# Patient Record
Sex: FEMALE | Race: BLACK OR AFRICAN AMERICAN | NOT HISPANIC OR LATINO | Employment: UNEMPLOYED | URBAN - NONMETROPOLITAN AREA
[De-identification: names, ages, dates, MRNs, and addresses within clinical notes are randomized per-mention and may not be internally consistent; named-entity substitution may affect disease eponyms.]

---

## 2019-10-09 ENCOUNTER — NON-PROVIDER VISIT (OUTPATIENT)
Dept: URGENT CARE | Facility: PHYSICIAN GROUP | Age: 19
End: 2019-10-09

## 2019-10-09 DIAGNOSIS — Z02.1 PRE-EMPLOYMENT DRUG SCREENING: ICD-10-CM

## 2019-10-09 LAB
AMP AMPHETAMINE: NORMAL
COC COCAINE: NORMAL
INT CON NEG: NORMAL
INT CON POS: NORMAL
MET METHAMPHETAMINES: NORMAL
OPI OPIATES: NORMAL
PCP PHENCYCLIDINE: NORMAL
POC DRUG COMMENT 753798-OCCUPATIONAL HEALTH: NORMAL
THC: NORMAL

## 2019-10-09 PROCEDURE — 80305 DRUG TEST PRSMV DIR OPT OBS: CPT | Performed by: PHYSICIAN ASSISTANT

## 2020-01-15 ENCOUNTER — INITIAL PRENATAL (OUTPATIENT)
Dept: OBGYN | Facility: CLINIC | Age: 20
End: 2020-01-15
Payer: MEDICAID

## 2020-01-15 VITALS
HEIGHT: 65 IN | SYSTOLIC BLOOD PRESSURE: 112 MMHG | WEIGHT: 212 LBS | DIASTOLIC BLOOD PRESSURE: 68 MMHG | BODY MASS INDEX: 35.32 KG/M2

## 2020-01-15 DIAGNOSIS — N93.8 DUB (DYSFUNCTIONAL UTERINE BLEEDING): ICD-10-CM

## 2020-01-15 LAB
INT CON NEG: NEGATIVE
INT CON POS: POSITIVE
POC URINE PREGNANCY TEST: POSITIVE

## 2020-01-15 PROCEDURE — 99203 OFFICE O/P NEW LOW 30 MIN: CPT | Mod: 25 | Performed by: OBSTETRICS & GYNECOLOGY

## 2020-01-15 PROCEDURE — 81025 URINE PREGNANCY TEST: CPT | Performed by: OBSTETRICS & GYNECOLOGY

## 2020-01-15 PROCEDURE — 76830 TRANSVAGINAL US NON-OB: CPT | Performed by: OBSTETRICS & GYNECOLOGY

## 2020-01-15 RX ORDER — ACETAMINOPHEN 325 MG/1
650 TABLET ORAL EVERY 4 HOURS PRN
COMMUNITY
End: 2021-11-26

## 2020-01-15 NOTE — PROGRESS NOTES
visit  LMP: Unknown  Positive UPT today in clinic  WT: 212 lb  BP: 112/68   Pt states having N&V. States no other complaints.   Prasanna # [phone]

## 2020-01-15 NOTE — PROGRESS NOTES
Chief complaint;  This patient is a 19 y.o. female , No LMP recorded. Patient is pregnant. presents complaining of dysfunctional uterine bleeding.    Review of systems-denies; chest pain, shortness of breath, fever, chills, abdominal pain  Past OB history-  OB History    Para Term  AB Living   1             SAB TAB Ectopic Molar Multiple Live Births                    # Outcome Date GA Lbr Noel/2nd Weight Sex Delivery Anes PTL Lv   1 Current              Past surgical history-   Past Surgical History:   Procedure Laterality Date   • DENTAL EXTRACTION(S)     • TONSILLECTOMY       Past medical history- History reviewed. No pertinent past medical history.  Allergies- Patient has no known allergies.  Present medications-   Outpatient Encounter Medications as of 1/15/2020   Medication Sig Dispense Refill   • Prenatal MV-Min-Fe Fum-FA-DHA (PRENATAL 1 PO) Take  by mouth.     • acetaminophen (TYLENOL) 325 MG Tab Take 650 mg by mouth every four hours as needed.       No facility-administered encounter medications on file as of 1/15/2020.      Family history- no history of breast or ovarian cancer  Social history-   Social History     Socioeconomic History   • Marital status: Single     Spouse name: Not on file   • Number of children: Not on file   • Years of education: Not on file   • Highest education level: Not on file   Occupational History   • Not on file   Social Needs   • Financial resource strain: Not on file   • Food insecurity:     Worry: Not on file     Inability: Not on file   • Transportation needs:     Medical: Not on file     Non-medical: Not on file   Tobacco Use   • Smoking status: Never Smoker   • Smokeless tobacco: Never Used   Substance and Sexual Activity   • Alcohol use: Not on file   • Drug use: Not Currently     Types: Marijuana     Comment: Quit 2019   • Sexual activity: Yes     Partners: Male     Birth control/protection: Condom     Comment: Unplanned pregnancy.    Lifestyle  "  • Physical activity:     Days per week: Not on file     Minutes per session: Not on file   • Stress: Not on file   Relationships   • Social connections:     Talks on phone: Not on file     Gets together: Not on file     Attends Faith service: Not on file     Active member of club or organization: Not on file     Attends meetings of clubs or organizations: Not on file     Relationship status: Not on file   • Intimate partner violence:     Fear of current or ex partner: Not on file     Emotionally abused: Not on file     Physically abused: Not on file     Forced sexual activity: Not on file   Other Topics Concern   • Not on file   Social History Narrative   • Not on file         Physical examination;   Alert and oriented x3  General-well-developed well-nourished female in no apparent distress  Vitals:    01/15/20 0939   BP: 112/68   Weight: 96.2 kg (212 lb)   Height: 1.651 m (5' 5\")     Skin is warm and dry   HEENT-normocephalic,nontraumatic,PERRLA,EOMI  Throat- no edema or erythema  Neck-supple  Cardiovascular-regular rate and rhythm, normal S1-S2 without murmurs or gallops  Lungs-clear to auscultation bilaterally  Back-negative CVA tenderness  Abdomen-nondistended positive bowel sounds soft nontender without masses or hepatosplenomegaly  Pelvic examination;  External genitalia-without lesions  Vagina-no blood, no discharge  Cervix-closed,no gross lesions  Uterus-  8 weeks size, nontender  Adnexa- without mass or tenderness  Extremities-without cyanosis clubbing or edema  Neurologic-grossly intact    Transvaginal ultrasound; as performed and read by myself; intrauterine gestational sac containing florian pregnancy crown-rump length consistent with 8 weeks 5 days positive cardiac and fetal motion.  Positive yolk sac no free pelvic fluid EDC 8/21/2020    Impression;  Dysfunctional uterine bleeding-no evidence of ectopic or miscarriage    Plan;     Needs initial OB            "

## 2020-02-12 ENCOUNTER — INITIAL PRENATAL (OUTPATIENT)
Dept: OBGYN | Facility: CLINIC | Age: 20
End: 2020-02-12
Payer: MEDICAID

## 2020-02-12 ENCOUNTER — HOSPITAL ENCOUNTER (OUTPATIENT)
Facility: MEDICAL CENTER | Age: 20
End: 2020-02-12
Attending: NURSE PRACTITIONER
Payer: MEDICAID

## 2020-02-12 VITALS
WEIGHT: 206.4 LBS | BODY MASS INDEX: 34.39 KG/M2 | DIASTOLIC BLOOD PRESSURE: 68 MMHG | SYSTOLIC BLOOD PRESSURE: 118 MMHG | HEIGHT: 65 IN

## 2020-02-12 DIAGNOSIS — Z34.00 SUPERVISION OF NORMAL FIRST PREGNANCY, ANTEPARTUM: ICD-10-CM

## 2020-02-12 DIAGNOSIS — Z34.00 SUPERVISION OF NORMAL FIRST PREGNANCY, ANTEPARTUM: Primary | ICD-10-CM

## 2020-02-12 LAB
APPEARANCE UR: NORMAL
BILIRUB UR STRIP-MCNC: NORMAL MG/DL
COLOR UR AUTO: NORMAL
GLUCOSE UR STRIP.AUTO-MCNC: NEGATIVE MG/DL
KETONES UR STRIP.AUTO-MCNC: NORMAL MG/DL
LEUKOCYTE ESTERASE UR QL STRIP.AUTO: NORMAL
NITRITE UR QL STRIP.AUTO: NEGATIVE
PH UR STRIP.AUTO: 6 [PH] (ref 5–8)
PROT UR QL STRIP: NEGATIVE MG/DL
RBC UR QL AUTO: NEGATIVE
SP GR UR STRIP.AUTO: 1.02
UROBILINOGEN UR STRIP-MCNC: NORMAL MG/DL

## 2020-02-12 PROCEDURE — 59402 PR NEW OB HIGH RISK: CPT | Performed by: NURSE PRACTITIONER

## 2020-02-12 PROCEDURE — 88175 CYTOPATH C/V AUTO FLUID REDO: CPT

## 2020-02-12 PROCEDURE — 87491 CHLMYD TRACH DNA AMP PROBE: CPT

## 2020-02-12 PROCEDURE — 81002 URINALYSIS NONAUTO W/O SCOPE: CPT | Performed by: NURSE PRACTITIONER

## 2020-02-12 PROCEDURE — 87591 N.GONORRHOEAE DNA AMP PROB: CPT

## 2020-02-12 SDOH — HEALTH STABILITY: MENTAL HEALTH: HOW OFTEN DO YOU HAVE A DRINK CONTAINING ALCOHOL?: NEVER

## 2020-02-12 ASSESSMENT — ENCOUNTER SYMPTOMS
NEUROLOGICAL NEGATIVE: 1
MUSCULOSKELETAL NEGATIVE: 1
RESPIRATORY NEGATIVE: 1
PSYCHIATRIC NEGATIVE: 1
CARDIOVASCULAR NEGATIVE: 1
GASTROINTESTINAL NEGATIVE: 1
EYES NEGATIVE: 1
CONSTITUTIONAL NEGATIVE: 1

## 2020-02-12 NOTE — LETTER
Cystic Fibrosis Carrier Testing  Divine Elise    The following information is about a blood test that can be done to determine if you and/or your partner carry the gene for cystic fibrosis.    WHAT IS CYSTIC FIBROSIS?  · Cystic fibrosis (CF) is an inherited disease that affects more than 25,000 American children and young adults.  · Symptoms of CF vary but include lung congestion, pneumonia, diarrhea and poor growth.  Most people with CF have severe medical problems and some die at a young age.  Others have so few symptoms they are unaware they have CF.  · CF does not affect intelligence.  · Although there is no cure for CF at this time, scientists are making progress in improving treatment and in searching for a cure.  In the past many people with CF  at a very young age.  Today, many are living into their 20’s and 30’s.    IS THERE A CHANCE MY BABY COULD HAVE CYSTIC FIBROSIS?  · You can have a child with CF even if there is no history in your family (see chart below).  · CF testing can help determine if you are a carrier and at risk to have a child with CF.  Note: if both parents are carriers, there is a 1 in 4 (25%) chance with each pregnancy that they will have a child with CF.  · Carriers have one normal CF gene and one altered CF gene.  · People with CF have two altered CF genes.  · Most people have two normal copies of the CF gene.    Approximate risk that a couple with no family history of cystic fibrosis will have a child with cystic fibrosis:    Ethnic background / Risk     couple:  1 in 2,500   couple:  1 in 15,000            couple:  1 in 8,000     American couple:  1 in 32,000     WHAT TESTING IS AVAILABLE?  · There is a blood test that can be done to find out if you or your partner is a carrier.  · It is important to understand that CF carrier testing does not detect all CF carriers.  · If the test shows that you are both CF carriers, you unborn baby can be  tested to find out if the baby has CF.    HOW MUCH DOES IT COST TO HAVE CYSTIC FIBROSIS CARRIER TESTING?  · Cost and insurance coverage for CF carrier testing vary depending upon the laboratory used and your insurance policy.  · The average cost for CF carrier testing is $300 per person.  · Your genetic counselor can provide you with more information about cystic fibrosis carrier testing.    _____  Yes, I am interested in discussing carrier testing with a genetic counselor.    _____  No, I am not interested in CF carrier testing or in receiving more information about CF carrier testing.      Client signature: ________________________________________  2/12/2020

## 2020-02-12 NOTE — LETTER
February 12, 2020            Divine Elise is currently being cared for at [facility].  This patient is pregnant and may continue to work.  She should not lift greater than 20 pounds and requires frequent rest periods (10 minutes every two hours).  Please allow patient access to water and restrooms as frequently as needed      Thank you,          Aruna Hernandes C.N.M.

## 2020-02-12 NOTE — PROGRESS NOTES
NOB today  LMP: pt doesn't know  Last pap: not yet, today  Phone # [phone]  Pharmacy confirmed   Needs notes for heavy lifting and breaks every 2 hrs   No complaints or concerns

## 2020-02-13 LAB
C TRACH DNA GENITAL QL NAA+PROBE: NEGATIVE
CYTOLOGY REG CYTOL: NORMAL
N GONORRHOEA DNA GENITAL QL NAA+PROBE: NEGATIVE
SPECIMEN SOURCE: NORMAL

## 2020-02-13 NOTE — PROGRESS NOTES
S:  Divine Elise is a 19 y.o.  who presents for her new OB exam.  She is 12w5d with and RHODA of Estimated Date of Delivery: 20 based off of US . She has no complaints.  She is currently working at [email or web address]. Discussed heavy lifting and chemical exposure. No ER visits or previous care in this pregnancy.     Unsure about AFP.  Declines CF.  Denies VB, LOF, or cramping.  Denies dysuria, vaginal DC. Reports no fetal movement.     Pt is single and lives with mother.  Pregnancy is unplanned but desired. FOB is involved.      Discussed diet and exercise during pregnancy. Encouraged good nutrition, and daily exercise including walking or swimming. Discussed expected weight gain during pregnancy. Discussed adequate hydration during pregnancy.    History reviewed. No pertinent past medical history.  Family History   Problem Relation Age of Onset   • Autism Brother    • Seizures Brother    • Cancer Maternal Aunt         Breast   • Diabetes Maternal Grandfather      Social History     Socioeconomic History   • Marital status: Single     Spouse name: Not on file   • Number of children: Not on file   • Years of education: Not on file   • Highest education level: Not on file   Occupational History   • Not on file   Social Needs   • Financial resource strain: Not on file   • Food insecurity     Worry: Not on file     Inability: Not on file   • Transportation needs     Medical: Not on file     Non-medical: Not on file   Tobacco Use   • Smoking status: Never Smoker   • Smokeless tobacco: Never Used   Substance and Sexual Activity   • Alcohol use: Never     Frequency: Never   • Drug use: Not Currently     Types: Marijuana     Comment: Quit 2019   • Sexual activity: Yes     Partners: Male     Comment: Unplanned pregnancy.    Lifestyle   • Physical activity     Days per week: Not on file     Minutes per session: Not on file   • Stress: Not on file   Relationships   • Social connections     Talks on phone: Not on file      "Gets together: Not on file     Attends Voodoo service: Not on file     Active member of club or organization: Not on file     Attends meetings of clubs or organizations: Not on file     Relationship status: Not on file   • Intimate partner violence     Fear of current or ex partner: Not on file     Emotionally abused: Not on file     Physically abused: Not on file     Forced sexual activity: Not on file   Other Topics Concern   • Not on file   Social History Narrative   • Not on file     OB History    Para Term  AB Living   1             SAB TAB Ectopic Molar Multiple Live Births                    # Outcome Date GA Lbr Noel/2nd Weight Sex Delivery Anes PTL Lv   1 Current                History of Varicella Virus: no  History of HSV I or II in self or partner: no  History of Thyroid problems: no    O:  /68   Ht 1.651 m (5' 5\")   Wt 93.6 kg (206 lb 6.4 oz)    See Prenatal Physical.    Wet mount: deferred, no s/sx  Chaperone offered: yes    A:   1.  IUP @ 12w5d per US        2.  S=D        3.  See problem list below             Patient Active Problem List    Diagnosis Date Noted   • Supervision of normal first pregnancy, antepartum 2020         P:  1.  GC/CT & pap done        2.  Prenatal labs ordered - lab slip given        3.  Discussed PNV, diet, avoidances and adequate water intake        4.  NOB packet given        5.  Return to office in 4 wks        6.  Complete OB US in 7-8 wks        7.  Centering patient        8.  Discuss AFP at next visit    Orders Placed This Encounter   • US-OB 2ND 3RD TRI COMPLETE   • PREG CNTR PRENATAL PN   • THINPREP RFLX HPV ASCUS W/CTNG   • URINE DRUG SCREEN W/CONF (AR)   • POCT Urinalysis       HPI    Review of Systems   Constitutional: Negative.    HENT: Negative.    Eyes: Negative.    Respiratory: Negative.    Cardiovascular: Negative.    Gastrointestinal: Negative.    Genitourinary: Negative.    Musculoskeletal: Negative.    Skin: Negative.  " "  Neurological: Negative.    Endo/Heme/Allergies: Negative.    Psychiatric/Behavioral: Negative.    All other systems reviewed and are negative.         Objective:     /68   Ht 1.651 m (5' 5\")   Wt 93.6 kg (206 lb 6.4 oz)   BMI 34.35 kg/m²      Physical Exam  Vitals signs and nursing note reviewed.   Constitutional:       Appearance: Normal appearance.   HENT:      Head: Normocephalic.      Nose: Nose normal.   Eyes:      Conjunctiva/sclera: Conjunctivae normal.   Neck:      Musculoskeletal: Normal range of motion and neck supple.   Cardiovascular:      Rate and Rhythm: Normal rate and regular rhythm.      Pulses: Normal pulses.      Heart sounds: Normal heart sounds.   Pulmonary:      Effort: Pulmonary effort is normal.      Breath sounds: Normal breath sounds.   Abdominal:      General: Bowel sounds are normal.      Comments: Gravid uterus, c/w 13 week gestation   Genitourinary:     General: Normal vulva.   Musculoskeletal: Normal range of motion.   Skin:     General: Skin is warm and dry.   Neurological:      General: No focal deficit present.      Mental Status: She is alert and oriented to person, place, and time.   Psychiatric:         Mood and Affect: Mood normal.         Behavior: Behavior normal.         Thought Content: Thought content normal.         Judgment: Judgment normal.          Assessment/Plan:       1. Supervision of normal first pregnancy, antepartum  RHODA 8/21/20 per US  - POCT Urinalysis  - PREG CNTR PRENATAL PN; Future  - THINPREP RFLX HPV ASCUS W/CTNG; Future  - URINE DRUG SCREEN W/CONF (AR); Future  - US-OB 2ND 3RD TRI COMPLETE; Future    "

## 2020-03-23 ENCOUNTER — TELEPHONE (OUTPATIENT)
Dept: OBGYN | Facility: CLINIC | Age: 20
End: 2020-03-23

## 2020-03-23 NOTE — TELEPHONE ENCOUNTER
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 Pt states work is requesting a letter advising she cannot push the carts. Consulted with Georges who stated it was okay to write the letter.

## 2020-04-08 ENCOUNTER — APPOINTMENT (OUTPATIENT)
Dept: RADIOLOGY | Facility: IMAGING CENTER | Age: 20
End: 2020-04-08
Attending: NURSE PRACTITIONER
Payer: MEDICAID

## 2020-04-08 DIAGNOSIS — Z34.00 SUPERVISION OF NORMAL FIRST PREGNANCY, ANTEPARTUM: ICD-10-CM

## 2020-04-08 PROCEDURE — 76805 OB US >/= 14 WKS SNGL FETUS: CPT | Mod: TC | Performed by: OBSTETRICS & GYNECOLOGY

## 2020-04-24 ENCOUNTER — ROUTINE PRENATAL (OUTPATIENT)
Dept: OBGYN | Facility: CLINIC | Age: 20
End: 2020-04-24
Payer: MEDICAID

## 2020-04-24 VITALS — DIASTOLIC BLOOD PRESSURE: 66 MMHG | BODY MASS INDEX: 35.23 KG/M2 | SYSTOLIC BLOOD PRESSURE: 110 MMHG | WEIGHT: 211.7 LBS

## 2020-04-24 DIAGNOSIS — Z34.00 SUPERVISION OF NORMAL FIRST PREGNANCY, ANTEPARTUM: ICD-10-CM

## 2020-04-24 DIAGNOSIS — R87.612 LGSIL ON PAP SMEAR OF CERVIX: ICD-10-CM

## 2020-04-24 PROCEDURE — 90040 PR PRENATAL FOLLOW UP: CPT | Performed by: PHYSICIAN ASSISTANT

## 2020-04-24 NOTE — PROGRESS NOTES
Pt has no complaints with cramping, bleeding or pain. +FM. US, GC/CT wnl - pt notified of results. PAP LGSIL - pt notified of results and need for repeat PAP 1 yr. Pt also hasnt been here for 9 wks, so urged to keep appts. Pt urged to do PNL asap, though too late for AFP now. RTC 4 wk or sooner prn.

## 2020-04-28 ENCOUNTER — HOSPITAL ENCOUNTER (OUTPATIENT)
Dept: LAB | Facility: MEDICAL CENTER | Age: 20
End: 2020-04-28
Attending: PHYSICIAN ASSISTANT
Payer: MEDICAID

## 2020-04-28 ENCOUNTER — HOSPITAL ENCOUNTER (OUTPATIENT)
Dept: LAB | Facility: MEDICAL CENTER | Age: 20
End: 2020-04-28
Attending: NURSE PRACTITIONER
Payer: MEDICAID

## 2020-04-28 DIAGNOSIS — Z34.00 SUPERVISION OF NORMAL FIRST PREGNANCY, ANTEPARTUM: ICD-10-CM

## 2020-04-28 LAB
ABO GROUP BLD: NORMAL
APPEARANCE UR: CLEAR
BACTERIA #/AREA URNS HPF: ABNORMAL /HPF
BASOPHILS # BLD AUTO: 0.4 % (ref 0–1.8)
BASOPHILS # BLD: 0.03 K/UL (ref 0–0.12)
BILIRUB UR QL STRIP.AUTO: NEGATIVE
BLD GP AB SCN SERPL QL: NORMAL
COLOR UR: YELLOW
EOSINOPHIL # BLD AUTO: 0.04 K/UL (ref 0–0.51)
EOSINOPHIL NFR BLD: 0.5 % (ref 0–6.9)
EPI CELLS #/AREA URNS HPF: ABNORMAL /HPF
ERYTHROCYTE [DISTWIDTH] IN BLOOD BY AUTOMATED COUNT: 45.5 FL (ref 35.9–50)
GLUCOSE UR STRIP.AUTO-MCNC: NEGATIVE MG/DL
HBV SURFACE AG SER QL: ABNORMAL
HCT VFR BLD AUTO: 34.2 % (ref 37–47)
HCV AB SER QL: NORMAL
HGB BLD-MCNC: 11.6 G/DL (ref 12–16)
HIV 1+2 AB+HIV1 P24 AG SERPL QL IA: NORMAL
HYALINE CASTS #/AREA URNS LPF: ABNORMAL /LPF
IMM GRANULOCYTES # BLD AUTO: 0.03 K/UL (ref 0–0.11)
IMM GRANULOCYTES NFR BLD AUTO: 0.4 % (ref 0–0.9)
KETONES UR STRIP.AUTO-MCNC: NEGATIVE MG/DL
LEUKOCYTE ESTERASE UR QL STRIP.AUTO: ABNORMAL
LYMPHOCYTES # BLD AUTO: 1.4 K/UL (ref 1–4.8)
LYMPHOCYTES NFR BLD: 17.3 % (ref 22–41)
MCH RBC QN AUTO: 31.5 PG (ref 27–33)
MCHC RBC AUTO-ENTMCNC: 33.9 G/DL (ref 33.6–35)
MCV RBC AUTO: 92.9 FL (ref 81.4–97.8)
MICRO URNS: ABNORMAL
MONOCYTES # BLD AUTO: 0.41 K/UL (ref 0–0.85)
MONOCYTES NFR BLD AUTO: 5.1 % (ref 0–13.4)
NEUTROPHILS # BLD AUTO: 6.16 K/UL (ref 2–7.15)
NEUTROPHILS NFR BLD: 76.3 % (ref 44–72)
NITRITE UR QL STRIP.AUTO: NEGATIVE
NRBC # BLD AUTO: 0 K/UL
NRBC BLD-RTO: 0 /100 WBC
PH UR STRIP.AUTO: 7 [PH] (ref 5–8)
PLATELET # BLD AUTO: 182 K/UL (ref 164–446)
PMV BLD AUTO: 10.7 FL (ref 9–12.9)
PROT UR QL STRIP: NEGATIVE MG/DL
RBC # BLD AUTO: 3.68 M/UL (ref 4.2–5.4)
RBC # URNS HPF: ABNORMAL /HPF
RBC UR QL AUTO: NEGATIVE
RH BLD: NORMAL
RUBV AB SER QL: 15.4 IU/ML
SP GR UR STRIP.AUTO: 1.02
TREPONEMA PALLIDUM IGG+IGM AB [PRESENCE] IN SERUM OR PLASMA BY IMMUNOASSAY: ABNORMAL
UROBILINOGEN UR STRIP.AUTO-MCNC: 0.2 MG/DL
WBC # BLD AUTO: 8.1 K/UL (ref 4.8–10.8)
WBC #/AREA URNS HPF: ABNORMAL /HPF

## 2020-04-28 PROCEDURE — 86803 HEPATITIS C AB TEST: CPT

## 2020-04-28 PROCEDURE — 87340 HEPATITIS B SURFACE AG IA: CPT

## 2020-04-28 PROCEDURE — 87389 HIV-1 AG W/HIV-1&-2 AB AG IA: CPT

## 2020-04-28 PROCEDURE — 81001 URINALYSIS AUTO W/SCOPE: CPT | Mod: XU

## 2020-04-28 PROCEDURE — 86900 BLOOD TYPING SEROLOGIC ABO: CPT

## 2020-04-28 PROCEDURE — 36415 COLL VENOUS BLD VENIPUNCTURE: CPT

## 2020-04-28 PROCEDURE — 86762 RUBELLA ANTIBODY: CPT

## 2020-04-28 PROCEDURE — 85025 COMPLETE CBC W/AUTO DIFF WBC: CPT

## 2020-04-28 PROCEDURE — 86850 RBC ANTIBODY SCREEN: CPT

## 2020-04-28 PROCEDURE — 80307 DRUG TEST PRSMV CHEM ANLYZR: CPT

## 2020-04-28 PROCEDURE — 86901 BLOOD TYPING SEROLOGIC RH(D): CPT

## 2020-04-28 PROCEDURE — 86780 TREPONEMA PALLIDUM: CPT

## 2020-04-29 PROBLEM — O26.899 RH NEGATIVE STATUS DURING PREGNANCY: Status: ACTIVE | Noted: 2020-04-29

## 2020-04-29 PROBLEM — Z67.91 RH NEGATIVE STATUS DURING PREGNANCY: Status: ACTIVE | Noted: 2020-04-29

## 2020-04-30 LAB
AMPHET CTO UR CFM-MCNC: NEGATIVE NG/ML
BARBITURATES CTO UR CFM-MCNC: NEGATIVE NG/ML
BENZODIAZ CTO UR CFM-MCNC: NEGATIVE NG/ML
CANNABINOIDS CTO UR CFM-MCNC: NEGATIVE NG/ML
COCAINE CTO UR CFM-MCNC: NEGATIVE NG/ML
DRUG COMMENT 753798: NORMAL
METHADONE CTO UR CFM-MCNC: NEGATIVE NG/ML
OPIATES CTO UR CFM-MCNC: NEGATIVE NG/ML
PCP CTO UR CFM-MCNC: NEGATIVE NG/ML
PROPOXYPH CTO UR CFM-MCNC: NEGATIVE NG/ML

## 2020-05-14 ENCOUNTER — ANESTHESIA EVENT (OUTPATIENT)
Dept: OBGYN | Facility: MEDICAL CENTER | Age: 20
End: 2020-05-14
Payer: MEDICAID

## 2020-05-14 ENCOUNTER — HOSPITAL ENCOUNTER (INPATIENT)
Facility: MEDICAL CENTER | Age: 20
LOS: 4 days | End: 2020-05-18
Attending: OBSTETRICS & GYNECOLOGY | Admitting: OBSTETRICS & GYNECOLOGY
Payer: MEDICAID

## 2020-05-14 ENCOUNTER — APPOINTMENT (OUTPATIENT)
Dept: RADIOLOGY | Facility: MEDICAL CENTER | Age: 20
End: 2020-05-14
Attending: OBSTETRICS & GYNECOLOGY
Payer: MEDICAID

## 2020-05-14 ENCOUNTER — ANESTHESIA (OUTPATIENT)
Dept: OBGYN | Facility: MEDICAL CENTER | Age: 20
End: 2020-05-14
Payer: MEDICAID

## 2020-05-14 LAB
A1 MICROGLOB PLACENTAL VAG QL: NEGATIVE
APPEARANCE UR: CLEAR
COLOR UR AUTO: YELLOW
ERYTHROCYTE [DISTWIDTH] IN BLOOD BY AUTOMATED COUNT: 43.1 FL (ref 35.9–50)
ERYTHROCYTE [DISTWIDTH] IN BLOOD BY AUTOMATED COUNT: 45.8 FL (ref 35.9–50)
GLUCOSE UR QL STRIP.AUTO: NEGATIVE MG/DL
HCT VFR BLD AUTO: 34.6 % (ref 37–47)
HCT VFR BLD AUTO: 36.9 % (ref 37–47)
HGB BLD-MCNC: 12.1 G/DL (ref 12–16)
HGB BLD-MCNC: 12.7 G/DL (ref 12–16)
KETONES UR QL STRIP.AUTO: NEGATIVE MG/DL
LEUKOCYTE ESTERASE UR QL STRIP.AUTO: ABNORMAL
MCH RBC QN AUTO: 31.3 PG (ref 27–33)
MCH RBC QN AUTO: 32 PG (ref 27–33)
MCHC RBC AUTO-ENTMCNC: 34.4 G/DL (ref 33.6–35)
MCHC RBC AUTO-ENTMCNC: 35 G/DL (ref 33.6–35)
MCV RBC AUTO: 89.6 FL (ref 81.4–97.8)
MCV RBC AUTO: 92.9 FL (ref 81.4–97.8)
NITRITE UR QL STRIP.AUTO: NEGATIVE
PATHOLOGY CONSULT NOTE: NORMAL
PH UR STRIP.AUTO: 7 [PH] (ref 5–8)
PLATELET # BLD AUTO: 178 K/UL (ref 164–446)
PLATELET # BLD AUTO: 194 K/UL (ref 164–446)
PMV BLD AUTO: 10.1 FL (ref 9–12.9)
PMV BLD AUTO: 10.2 FL (ref 9–12.9)
PROT UR QL STRIP: NEGATIVE MG/DL
RBC # BLD AUTO: 3.86 M/UL (ref 4.2–5.4)
RBC # BLD AUTO: 3.97 M/UL (ref 4.2–5.4)
RBC UR QL AUTO: ABNORMAL
SP GR UR: 1.02 (ref 1–1.03)
WBC # BLD AUTO: 14 K/UL (ref 4.8–10.8)
WBC # BLD AUTO: 18.7 K/UL (ref 4.8–10.8)

## 2020-05-14 PROCEDURE — 160009 HCHG ANES TIME/MIN: Performed by: OBSTETRICS & GYNECOLOGY

## 2020-05-14 PROCEDURE — 59514 CESAREAN DELIVERY ONLY: CPT | Performed by: OBSTETRICS & GYNECOLOGY

## 2020-05-14 PROCEDURE — 59514 CESAREAN DELIVERY ONLY: CPT

## 2020-05-14 PROCEDURE — 160041 HCHG SURGERY MINUTES - EA ADDL 1 MIN LEVEL 4: Performed by: OBSTETRICS & GYNECOLOGY

## 2020-05-14 PROCEDURE — 84112 EVAL AMNIOTIC FLUID PROTEIN: CPT

## 2020-05-14 PROCEDURE — 160035 HCHG PACU - 1ST 60 MINS PHASE I: Performed by: OBSTETRICS & GYNECOLOGY

## 2020-05-14 PROCEDURE — 85027 COMPLETE CBC AUTOMATED: CPT

## 2020-05-14 PROCEDURE — 87150 DNA/RNA AMPLIFIED PROBE: CPT

## 2020-05-14 PROCEDURE — A9270 NON-COVERED ITEM OR SERVICE: HCPCS | Performed by: OBSTETRICS & GYNECOLOGY

## 2020-05-14 PROCEDURE — 59514 CESAREAN DELIVERY ONLY: CPT | Mod: 80 | Performed by: OBSTETRICS & GYNECOLOGY

## 2020-05-14 PROCEDURE — 36415 COLL VENOUS BLD VENIPUNCTURE: CPT

## 2020-05-14 PROCEDURE — A9270 NON-COVERED ITEM OR SERVICE: HCPCS

## 2020-05-14 PROCEDURE — 87081 CULTURE SCREEN ONLY: CPT

## 2020-05-14 PROCEDURE — 700102 HCHG RX REV CODE 250 W/ 637 OVERRIDE(OP): Performed by: OBSTETRICS & GYNECOLOGY

## 2020-05-14 PROCEDURE — 700102 HCHG RX REV CODE 250 W/ 637 OVERRIDE(OP): Performed by: ANESTHESIOLOGY

## 2020-05-14 PROCEDURE — 700111 HCHG RX REV CODE 636 W/ 250 OVERRIDE (IP)

## 2020-05-14 PROCEDURE — A9270 NON-COVERED ITEM OR SERVICE: HCPCS | Performed by: ANESTHESIOLOGY

## 2020-05-14 PROCEDURE — 700105 HCHG RX REV CODE 258

## 2020-05-14 PROCEDURE — 76816 OB US FOLLOW-UP PER FETUS: CPT

## 2020-05-14 PROCEDURE — 160002 HCHG RECOVERY MINUTES (STAT): Performed by: OBSTETRICS & GYNECOLOGY

## 2020-05-14 PROCEDURE — 770002 HCHG ROOM/CARE - OB PRIVATE (112)

## 2020-05-14 PROCEDURE — 88305 TISSUE EXAM BY PATHOLOGIST: CPT

## 2020-05-14 PROCEDURE — 700102 HCHG RX REV CODE 250 W/ 637 OVERRIDE(OP)

## 2020-05-14 PROCEDURE — 160029 HCHG SURGERY MINUTES - 1ST 30 MINS LEVEL 4: Performed by: OBSTETRICS & GYNECOLOGY

## 2020-05-14 PROCEDURE — 160048 HCHG OR STATISTICAL LEVEL 1-5: Performed by: OBSTETRICS & GYNECOLOGY

## 2020-05-14 PROCEDURE — 700101 HCHG RX REV CODE 250: Performed by: ANESTHESIOLOGY

## 2020-05-14 PROCEDURE — A6212 FOAM DRG <=16 SQ IN W/BORDER: HCPCS

## 2020-05-14 PROCEDURE — 700111 HCHG RX REV CODE 636 W/ 250 OVERRIDE (IP): Performed by: ANESTHESIOLOGY

## 2020-05-14 PROCEDURE — 700111 HCHG RX REV CODE 636 W/ 250 OVERRIDE (IP): Performed by: OBSTETRICS & GYNECOLOGY

## 2020-05-14 PROCEDURE — 81002 URINALYSIS NONAUTO W/O SCOPE: CPT

## 2020-05-14 PROCEDURE — 700105 HCHG RX REV CODE 258: Performed by: ANESTHESIOLOGY

## 2020-05-14 RX ORDER — INDOMETHACIN 25 MG/1
25 CAPSULE ORAL EVERY 6 HOURS
Status: DISCONTINUED | OUTPATIENT
Start: 2020-05-14 | End: 2020-05-14

## 2020-05-14 RX ORDER — HYDRALAZINE HYDROCHLORIDE 20 MG/ML
5 INJECTION INTRAMUSCULAR; INTRAVENOUS
Status: CANCELLED | OUTPATIENT
Start: 2020-05-14

## 2020-05-14 RX ORDER — METOCLOPRAMIDE HYDROCHLORIDE 5 MG/ML
INJECTION INTRAMUSCULAR; INTRAVENOUS
Status: COMPLETED
Start: 2020-05-14 | End: 2020-05-14

## 2020-05-14 RX ORDER — DIPHENHYDRAMINE HYDROCHLORIDE 50 MG/ML
25 INJECTION INTRAMUSCULAR; INTRAVENOUS EVERY 6 HOURS PRN
Status: ACTIVE | OUTPATIENT
Start: 2020-05-14 | End: 2020-05-15

## 2020-05-14 RX ORDER — HYDROMORPHONE HYDROCHLORIDE 1 MG/ML
0.6 INJECTION, SOLUTION INTRAMUSCULAR; INTRAVENOUS; SUBCUTANEOUS
Status: CANCELLED | OUTPATIENT
Start: 2020-05-14

## 2020-05-14 RX ORDER — HYDROMORPHONE HYDROCHLORIDE 1 MG/ML
0.2 INJECTION, SOLUTION INTRAMUSCULAR; INTRAVENOUS; SUBCUTANEOUS
Status: CANCELLED | OUTPATIENT
Start: 2020-05-14

## 2020-05-14 RX ORDER — BUPIVACAINE HYDROCHLORIDE 7.5 MG/ML
INJECTION, SOLUTION INTRASPINAL
Status: COMPLETED | OUTPATIENT
Start: 2020-05-14 | End: 2020-05-14

## 2020-05-14 RX ORDER — PENICILLIN G POTASSIUM 5000000 [IU]/1
5 INJECTION, POWDER, FOR SOLUTION INTRAMUSCULAR; INTRAVENOUS ONCE
Status: COMPLETED | OUTPATIENT
Start: 2020-05-14 | End: 2020-05-14

## 2020-05-14 RX ORDER — SODIUM CHLORIDE, SODIUM LACTATE, POTASSIUM CHLORIDE, CALCIUM CHLORIDE 600; 310; 30; 20 MG/100ML; MG/100ML; MG/100ML; MG/100ML
INJECTION, SOLUTION INTRAVENOUS PRN
Status: DISCONTINUED | OUTPATIENT
Start: 2020-05-14 | End: 2020-05-18 | Stop reason: HOSPADM

## 2020-05-14 RX ORDER — OXYCODONE HYDROCHLORIDE 10 MG/1
10 TABLET ORAL EVERY 4 HOURS PRN
Status: ACTIVE | OUTPATIENT
Start: 2020-05-14 | End: 2020-05-15

## 2020-05-14 RX ORDER — CITRIC ACID/SODIUM CITRATE 334-500MG
SOLUTION, ORAL ORAL
Status: COMPLETED
Start: 2020-05-14 | End: 2020-05-14

## 2020-05-14 RX ORDER — MISOPROSTOL 200 UG/1
800 TABLET ORAL
Status: DISCONTINUED | OUTPATIENT
Start: 2020-05-14 | End: 2020-05-18 | Stop reason: HOSPADM

## 2020-05-14 RX ORDER — MAGNESIUM SULFATE HEPTAHYDRATE 40 MG/ML
2 INJECTION, SOLUTION INTRAVENOUS ONCE
Status: DISCONTINUED | OUTPATIENT
Start: 2020-05-14 | End: 2020-05-14

## 2020-05-14 RX ORDER — MAGNESIUM SULFATE HEPTAHYDRATE 40 MG/ML
INJECTION, SOLUTION INTRAVENOUS
Status: COMPLETED
Start: 2020-05-14 | End: 2020-05-14

## 2020-05-14 RX ORDER — HYDROMORPHONE HYDROCHLORIDE 1 MG/ML
0.4 INJECTION, SOLUTION INTRAMUSCULAR; INTRAVENOUS; SUBCUTANEOUS
Status: CANCELLED | OUTPATIENT
Start: 2020-05-14

## 2020-05-14 RX ORDER — DIPHENHYDRAMINE HYDROCHLORIDE 50 MG/ML
12.5 INJECTION INTRAMUSCULAR; INTRAVENOUS EVERY 6 HOURS PRN
Status: ACTIVE | OUTPATIENT
Start: 2020-05-14 | End: 2020-05-15

## 2020-05-14 RX ORDER — HYDROMORPHONE HYDROCHLORIDE 1 MG/ML
0.2 INJECTION, SOLUTION INTRAMUSCULAR; INTRAVENOUS; SUBCUTANEOUS
Status: ACTIVE | OUTPATIENT
Start: 2020-05-14 | End: 2020-05-15

## 2020-05-14 RX ORDER — CEFAZOLIN SODIUM 1 G/3ML
INJECTION, POWDER, FOR SOLUTION INTRAMUSCULAR; INTRAVENOUS PRN
Status: DISCONTINUED | OUTPATIENT
Start: 2020-05-14 | End: 2020-05-14 | Stop reason: SURG

## 2020-05-14 RX ORDER — MORPHINE SULFATE 0.5 MG/ML
INJECTION, SOLUTION EPIDURAL; INTRATHECAL; INTRAVENOUS
Status: COMPLETED | OUTPATIENT
Start: 2020-05-14 | End: 2020-05-14

## 2020-05-14 RX ORDER — SODIUM CHLORIDE 9 MG/ML
INJECTION, SOLUTION INTRAVENOUS
Status: ACTIVE
Start: 2020-05-14 | End: 2020-05-14

## 2020-05-14 RX ORDER — MAGNESIUM SULFATE HEPTAHYDRATE 40 MG/ML
4 INJECTION, SOLUTION INTRAVENOUS ONCE
Status: COMPLETED | OUTPATIENT
Start: 2020-05-14 | End: 2020-05-14

## 2020-05-14 RX ORDER — DIPHENHYDRAMINE HYDROCHLORIDE 50 MG/ML
12.5 INJECTION INTRAMUSCULAR; INTRAVENOUS
Status: CANCELLED | OUTPATIENT
Start: 2020-05-14

## 2020-05-14 RX ORDER — OXYCODONE HCL 5 MG/5 ML
5 SOLUTION, ORAL ORAL
Status: CANCELLED | OUTPATIENT
Start: 2020-05-14

## 2020-05-14 RX ORDER — BETAMETHASONE SODIUM PHOSPHATE AND BETAMETHASONE ACETATE 3; 3 MG/ML; MG/ML
12 INJECTION, SUSPENSION INTRA-ARTICULAR; INTRALESIONAL; INTRAMUSCULAR; SOFT TISSUE EVERY 24 HOURS
Status: DISCONTINUED | OUTPATIENT
Start: 2020-05-14 | End: 2020-05-14

## 2020-05-14 RX ORDER — MEPERIDINE HYDROCHLORIDE 25 MG/ML
6.25 INJECTION INTRAMUSCULAR; INTRAVENOUS; SUBCUTANEOUS
Status: CANCELLED | OUTPATIENT
Start: 2020-05-14

## 2020-05-14 RX ORDER — SODIUM CHLORIDE, SODIUM GLUCONATE, SODIUM ACETATE, POTASSIUM CHLORIDE AND MAGNESIUM CHLORIDE 526; 502; 368; 37; 30 MG/100ML; MG/100ML; MG/100ML; MG/100ML; MG/100ML
INJECTION, SOLUTION INTRAVENOUS
Status: DISCONTINUED | OUTPATIENT
Start: 2020-05-14 | End: 2020-05-14 | Stop reason: SURG

## 2020-05-14 RX ORDER — DOCUSATE SODIUM 100 MG/1
100 CAPSULE, LIQUID FILLED ORAL 2 TIMES DAILY
Status: DISCONTINUED | OUTPATIENT
Start: 2020-05-14 | End: 2020-05-14

## 2020-05-14 RX ORDER — ONDANSETRON 2 MG/ML
4 INJECTION INTRAMUSCULAR; INTRAVENOUS
Status: CANCELLED | OUTPATIENT
Start: 2020-05-14

## 2020-05-14 RX ORDER — HALOPERIDOL 5 MG/ML
1 INJECTION INTRAMUSCULAR
Status: CANCELLED | OUTPATIENT
Start: 2020-05-14

## 2020-05-14 RX ORDER — INDOMETHACIN 50 MG/1
50 CAPSULE ORAL ONCE
Status: COMPLETED | OUTPATIENT
Start: 2020-05-14 | End: 2020-05-14

## 2020-05-14 RX ORDER — SIMETHICONE 80 MG
80 TABLET,CHEWABLE ORAL 4 TIMES DAILY PRN
Status: DISCONTINUED | OUTPATIENT
Start: 2020-05-14 | End: 2020-05-18 | Stop reason: HOSPADM

## 2020-05-14 RX ORDER — ONDANSETRON 2 MG/ML
4 INJECTION INTRAMUSCULAR; INTRAVENOUS EVERY 6 HOURS PRN
Status: ACTIVE | OUTPATIENT
Start: 2020-05-14 | End: 2020-05-15

## 2020-05-14 RX ORDER — OXYCODONE HYDROCHLORIDE 5 MG/1
5 TABLET ORAL EVERY 4 HOURS PRN
Status: DISPENSED | OUTPATIENT
Start: 2020-05-14 | End: 2020-05-15

## 2020-05-14 RX ORDER — DOCUSATE SODIUM 100 MG/1
100 CAPSULE, LIQUID FILLED ORAL 2 TIMES DAILY PRN
Status: DISCONTINUED | OUTPATIENT
Start: 2020-05-14 | End: 2020-05-18 | Stop reason: HOSPADM

## 2020-05-14 RX ORDER — SODIUM CHLORIDE, SODIUM LACTATE, POTASSIUM CHLORIDE, CALCIUM CHLORIDE 600; 310; 30; 20 MG/100ML; MG/100ML; MG/100ML; MG/100ML
INJECTION, SOLUTION INTRAVENOUS CONTINUOUS
Status: CANCELLED | OUTPATIENT
Start: 2020-05-14

## 2020-05-14 RX ORDER — OXYTOCIN 10 [USP'U]/ML
INJECTION, SOLUTION INTRAMUSCULAR; INTRAVENOUS PRN
Status: DISCONTINUED | OUTPATIENT
Start: 2020-05-14 | End: 2020-05-14 | Stop reason: SURG

## 2020-05-14 RX ORDER — ACETAMINOPHEN 500 MG
1000 TABLET ORAL EVERY 6 HOURS
Status: COMPLETED | OUTPATIENT
Start: 2020-05-14 | End: 2020-05-15

## 2020-05-14 RX ORDER — PENICILLIN G POTASSIUM 5000000 [IU]/1
INJECTION, POWDER, FOR SOLUTION INTRAMUSCULAR; INTRAVENOUS
Status: COMPLETED
Start: 2020-05-14 | End: 2020-05-14

## 2020-05-14 RX ORDER — HYDROMORPHONE HYDROCHLORIDE 1 MG/ML
0.4 INJECTION, SOLUTION INTRAMUSCULAR; INTRAVENOUS; SUBCUTANEOUS
Status: ACTIVE | OUTPATIENT
Start: 2020-05-14 | End: 2020-05-15

## 2020-05-14 RX ORDER — METOPROLOL TARTRATE 1 MG/ML
1 INJECTION, SOLUTION INTRAVENOUS
Status: CANCELLED | OUTPATIENT
Start: 2020-05-14

## 2020-05-14 RX ORDER — KETOROLAC TROMETHAMINE 30 MG/ML
INJECTION, SOLUTION INTRAMUSCULAR; INTRAVENOUS PRN
Status: DISCONTINUED | OUTPATIENT
Start: 2020-05-14 | End: 2020-05-14 | Stop reason: SURG

## 2020-05-14 RX ORDER — KETOROLAC TROMETHAMINE 30 MG/ML
30 INJECTION, SOLUTION INTRAMUSCULAR; INTRAVENOUS EVERY 6 HOURS
Status: DISPENSED | OUTPATIENT
Start: 2020-05-14 | End: 2020-05-15

## 2020-05-14 RX ORDER — ONDANSETRON 2 MG/ML
4 INJECTION INTRAMUSCULAR; INTRAVENOUS EVERY 6 HOURS PRN
Status: DISCONTINUED | OUTPATIENT
Start: 2020-05-14 | End: 2020-05-14

## 2020-05-14 RX ORDER — OXYCODONE HCL 5 MG/5 ML
10 SOLUTION, ORAL ORAL
Status: CANCELLED | OUTPATIENT
Start: 2020-05-14

## 2020-05-14 RX ORDER — MAGNESIUM SULFATE HEPTAHYDRATE 40 MG/ML
2 INJECTION, SOLUTION INTRAVENOUS CONTINUOUS
Status: DISCONTINUED | OUTPATIENT
Start: 2020-05-14 | End: 2020-05-14

## 2020-05-14 RX ORDER — SODIUM CHLORIDE, SODIUM LACTATE, POTASSIUM CHLORIDE, CALCIUM CHLORIDE 600; 310; 30; 20 MG/100ML; MG/100ML; MG/100ML; MG/100ML
INJECTION, SOLUTION INTRAVENOUS
Status: COMPLETED
Start: 2020-05-14 | End: 2020-05-14

## 2020-05-14 RX ADMIN — OXYTOCIN 20 ML: 10 INJECTION, SOLUTION INTRAMUSCULAR; INTRAVENOUS at 12:09

## 2020-05-14 RX ADMIN — FAMOTIDINE 20 MG: 10 INJECTION INTRAVENOUS at 11:46

## 2020-05-14 RX ADMIN — OXYCODONE 5 MG: 5 TABLET ORAL at 20:52

## 2020-05-14 RX ADMIN — KETOROLAC TROMETHAMINE 30 MG: 30 INJECTION, SOLUTION INTRAMUSCULAR at 18:24

## 2020-05-14 RX ADMIN — OXYTOCIN 20 UNITS: 10 INJECTION, SOLUTION INTRAMUSCULAR; INTRAVENOUS at 12:38

## 2020-05-14 RX ADMIN — MAGNESIUM SULFATE HEPTAHYDRATE 2 G/HR: 40 INJECTION, SOLUTION INTRAVENOUS at 10:32

## 2020-05-14 RX ADMIN — BETAMETHASONE SODIUM PHOSPHATE AND BETAMETHASONE ACETATE 12 MG: 3; 3 INJECTION, SUSPENSION INTRA-ARTICULAR; INTRALESIONAL; INTRAMUSCULAR at 11:40

## 2020-05-14 RX ADMIN — INDOMETHACIN 50 MG: 50 CAPSULE ORAL at 10:22

## 2020-05-14 RX ADMIN — SODIUM CHLORIDE, SODIUM GLUCONATE, SODIUM ACETATE, POTASSIUM CHLORIDE AND MAGNESIUM CHLORIDE: 526; 502; 368; 37; 30 INJECTION, SOLUTION INTRAVENOUS at 11:52

## 2020-05-14 RX ADMIN — MORPHINE SULFATE 150 MCG: 0.5 INJECTION, SOLUTION EPIDURAL; INTRATHECAL; INTRAVENOUS at 11:57

## 2020-05-14 RX ADMIN — PENICILLIN G POTASSIUM 5 MILLION UNITS: 5000000 INJECTION, POWDER, FOR SOLUTION INTRAMUSCULAR; INTRAVENOUS at 10:24

## 2020-05-14 RX ADMIN — ACETAMINOPHEN 1000 MG: 500 TABLET ORAL at 15:25

## 2020-05-14 RX ADMIN — MAGNESIUM SULFATE IN WATER 4 G: 40 INJECTION, SOLUTION INTRAVENOUS at 10:10

## 2020-05-14 RX ADMIN — SODIUM CHLORIDE, POTASSIUM CHLORIDE, SODIUM LACTATE AND CALCIUM CHLORIDE: 600; 310; 30; 20 INJECTION, SOLUTION INTRAVENOUS at 11:40

## 2020-05-14 RX ADMIN — SODIUM CHLORIDE, POTASSIUM CHLORIDE, SODIUM LACTATE AND CALCIUM CHLORIDE 1000 ML: 600; 310; 30; 20 INJECTION, SOLUTION INTRAVENOUS at 10:53

## 2020-05-14 RX ADMIN — FENTANYL CITRATE 10 MCG: 50 INJECTION INTRAMUSCULAR; INTRAVENOUS at 11:57

## 2020-05-14 RX ADMIN — SODIUM CITRATE AND CITRIC ACID MONOHYDRATE 30 ML: 500; 334 SOLUTION ORAL at 11:46

## 2020-05-14 RX ADMIN — ACETAMINOPHEN 1000 MG: 500 TABLET ORAL at 20:43

## 2020-05-14 RX ADMIN — CEFAZOLIN 2 G: 330 INJECTION, POWDER, FOR SOLUTION INTRAMUSCULAR; INTRAVENOUS at 11:52

## 2020-05-14 RX ADMIN — PENICILLIN G POTASSIUM 5 MILLION UNITS: 5000000 POWDER, FOR SOLUTION INTRAMUSCULAR; INTRAPLEURAL; INTRATHECAL; INTRAVENOUS at 10:24

## 2020-05-14 RX ADMIN — BUPIVACAINE HYDROCHLORIDE IN DEXTROSE 1.5 ML: 7.5 INJECTION, SOLUTION SUBARACHNOID at 11:57

## 2020-05-14 RX ADMIN — KETOROLAC TROMETHAMINE 30 MG: 30 INJECTION, SOLUTION INTRAMUSCULAR at 12:40

## 2020-05-14 RX ADMIN — OXYCODONE 5 MG: 5 TABLET ORAL at 16:13

## 2020-05-14 RX ADMIN — METOCLOPRAMIDE 10 MG: 5 INJECTION, SOLUTION INTRAMUSCULAR; INTRAVENOUS at 11:46

## 2020-05-14 ASSESSMENT — PAIN SCALES - GENERAL
PAINLEVEL: 9
PAIN_LEVEL: 1

## 2020-05-14 ASSESSMENT — PATIENT HEALTH QUESTIONNAIRE - PHQ9
1. LITTLE INTEREST OR PLEASURE IN DOING THINGS: NOT AT ALL
SUM OF ALL RESPONSES TO PHQ9 QUESTIONS 1 AND 2: 0
2. FEELING DOWN, DEPRESSED, IRRITABLE, OR HOPELESS: NOT AT ALL

## 2020-05-14 NOTE — H&P
History and Physical    Divine Elise is a 19 y.o. female  at 25w6d who presents for lower abdominal pain which started last night and comes and goes random intervals.  She states she worked a full day on her feet yesterday.  She denies any complications with this pregnancy.  She is also having intermittent fluid per vagina which she describes as mucus in consistency.    Subjective:   CTXs: unsure   Pain: positive  LOF: positive - with mucus  Vaginal bleeding: positive - with pink spotting   Fetal movement: positive    ROS: Pertinent positives documented in HPI and all other systems reviewed & are negative    OB History    Para Term  AB Living   1             SAB TAB Ectopic Molar Multiple Live Births                    # Outcome Date GA Lbr Noel/2nd Weight Sex Delivery Anes PTL Lv   1 Current                PGYNHx: denies STDs, LGSIL pap smear in the pregnancy (first pap)    Past Medical History:   Diagnosis Date   • Rh negative status during pregnancy 2020       Past Surgical History:   Procedure Laterality Date   • DENTAL EXTRACTION(S)     • TONSILLECTOMY           Current Facility-Administered Medications:   •  ondansetron (ZOFRAN) syringe/vial injection 4 mg, 4 mg, Intravenous, Q6HRS PRN, Marisol Rick, D.O.  •  magnesium sulfate IVPB premix 2 g, 2 g, Intravenous, Once, Marisol Rick, D.O.  •  magnesium sulfate IVPB premix 4 g, 4 g, Intravenous, Once, Marisol Rick, D.O.  •  docusate sodium (COLACE) capsule 100 mg, 100 mg, Oral, BID, Marisol Rick, D.O.  •  betamethasone acetate-betamethasone sodium phosphate (CELESTONE) injection 12 mg, 12 mg, Intramuscular, Q24HR, Marisol Rick, D.O.  •  indomethacin (INDOCIN) capsule 50 mg, 50 mg, Oral, Once, Marisol Rick, D.O.  •  indomethacin (INDOCIN) capsule 25 mg, 25 mg, Oral, Q6HRS, Marisol Rick, D.O.  •  penicillin G potassium injection 5 Million Units, 5 Million Units, Intravenous, Once, Marisol Rick, D.O.  •  penicillin G potassium 2.5 Million Units  in  mL IVPB, 2.5 Million Units, Intravenous, Q4HRS, Marisol Cabrera D.O.    Allergies: Patient has no known allergies.    Social History     Socioeconomic History   • Marital status: Single     Spouse name: Not on file   • Number of children: Not on file   • Years of education: Not on file   • Highest education level: Not on file   Occupational History   • Not on file   Social Needs   • Financial resource strain: Not on file   • Food insecurity     Worry: Not on file     Inability: Not on file   • Transportation needs     Medical: Not on file     Non-medical: Not on file   Tobacco Use   • Smoking status: Never Smoker   • Smokeless tobacco: Never Used   Substance and Sexual Activity   • Alcohol use: Never     Frequency: Never   • Drug use: Not Currently     Types: Marijuana     Comment: Quit 12/25/2019   • Sexual activity: Yes     Partners: Male     Comment: Unplanned pregnancy.    Lifestyle   • Physical activity     Days per week: Not on file     Minutes per session: Not on file   • Stress: Not on file   Relationships   • Social connections     Talks on phone: Not on file     Gets together: Not on file     Attends [employer] service: Not on file     Active member of club or organization: Not on file     Attends meetings of clubs or organizations: Not on file     Relationship status: Not on file   • Intimate partner violence     Fear of current or ex partner: Not on file     Emotionally abused: Not on file     Physically abused: Not on file     Forced sexual activity: Not on file   Other Topics Concern   • Not on file   Social History Narrative   • Not on file     Prenatal care with [facility] with following problems:  Patient Active Problem List    Diagnosis Date Noted   • Rh negative status during pregnancy 04/29/2020   • LGSIL on Pap smear of cervix - f/u PAP 1 yr 04/24/2020   • Supervision of normal first pregnancy, antepartum 02/12/2020     Objective:      /72   Pulse 85   Temp 36.7 °C (98 °F) (Temporal)    "Resp 16   Ht 1.651 m (5' 5\")   Wt 96.2 kg (212 lb)   SpO2 99%     General:   no acute distress, alert   Skin:   normal   HEENT:  PERRLA   Lungs:   CTA bilateral   Abdomen:   soft, gravid, NT   EFW:  600gr   Pelvis:  adequate with gynecoid pelvis   FHT:  150 BPM  Accels yes  Decels occasional variable decels  Variability moderate  Category II   Uterine Size: S=D   Presentations: Cephalic   Cervix:  On speculum exam fetal head and hair were noted protruding from the cervical os.  With bulging membranes.  There was fluid weeping around the membranes into the vaginal canal.  On sterile digital exam, the cervix is sick centimeters dilated with bulging membranes     Lab Review  Lab:   Blood type: O     Recent Results (from the past 5880 hour(s))   POCT 6 Panel Urine Drug Screen    Collection Time: 10/09/19  5:17 PM   Result Value Ref Range    AMPHETAMINE      POC THC      COCAINE      OPIATES      PHENCYCLIDINE      METHAMPHETAMINES      POC Urine Drug Screen Comment non-con     Internal Control Positive Valid     Internal Control Negative Valid    POCT Pregnancy    Collection Time: 01/15/20  9:29 AM   Result Value Ref Range    POC Urine Pregnancy Test POSITIVE Negative    Internal Control Positive Positive     Internal Control Negative Negative    POCT Urinalysis    Collection Time: 02/12/20  2:51 PM   Result Value Ref Range    POC Color      POC Appearance      POC Leukocyte Esterase small Negative    POC Nitrites negative Negative    POC Urobiligen      POC Protein negative Negative mg/dL    POC Urine PH 6.0 5.0 - 8.0    POC Blood negative Negative    POC Specific Gravity 1.025 <1.005 - >1.030    POC Ketones trace Negative mg/dL    POC Bilirubin      POC Glucose negative Negative mg/dL   THINPREP RFLX HPV ASCUS W/CTNG    Collection Time: 02/12/20  4:17 PM   Result Value Ref Range    Cytology Reg See Path Report     C. trachomatis by PCR Negative Negative    N. gonorrhoeae by PCR Negative Negative    Source " Endo/Cervical    URINE DRUG SCREEN W/CONF (AR)    Collection Time: 04/28/20 12:17 PM   Result Value Ref Range    Urine Amphetamine-Methamphetam Negative Cutoff 300 ng/mL    Barbiturates Negative Cutoff 200 ng/mL    Benzodiazepines Negative Cutoff 200 ng/mL    Propoxyphene Negative Cutoff 300 ng/mL    Cocaine Metabolite Negative Cutoff 150 ng/mL    Methadone Negative Cutoff 150 ng/mL    Codeine-Morphine Negative Cutoff 300 ng/mL    Phencyclidine -Pcp Negative Cutoff 25 ng/mL    Cannabinoid Metab Negative Cutoff 20 ng/mL    Drug Comment Urine Drugs See Note    PREG CNTR PRENATAL PN    Collection Time: 04/28/20 12:17 PM   Result Value Ref Range    Color Yellow     Character Clear     Specific Gravity 1.018 <1.035    Ph 7.0 5.0 - 8.0    Glucose Negative Negative mg/dL    Ketones Negative Negative mg/dL    Protein Negative Negative mg/dL    Bilirubin Negative Negative    Urobilinogen, Urine 0.2 Negative    Nitrite Negative Negative    Leukocyte Esterase Small (A) Negative    Occult Blood Negative Negative    Micro Urine Req Microscopic     WBC 8.1 4.8 - 10.8 K/uL    RBC 3.68 (L) 4.20 - 5.40 M/uL    Hemoglobin 11.6 (L) 12.0 - 16.0 g/dL    Hematocrit 34.2 (L) 37.0 - 47.0 %    MCV 92.9 81.4 - 97.8 fL    MCH 31.5 27.0 - 33.0 pg    MCHC 33.9 33.6 - 35.0 g/dL    RDW 45.5 35.9 - 50.0 fL    Platelet Count 182 164 - 446 K/uL    MPV 10.7 9.0 - 12.9 fL    Neutrophils-Polys 76.30 (H) 44.00 - 72.00 %    Lymphocytes 17.30 (L) 22.00 - 41.00 %    Monocytes 5.10 0.00 - 13.40 %    Eosinophils 0.50 0.00 - 6.90 %    Basophils 0.40 0.00 - 1.80 %    Immature Granulocytes 0.40 0.00 - 0.90 %    Nucleated RBC 0.00 /100 WBC    Neutrophils (Absolute) 6.16 2.00 - 7.15 K/uL    Lymphs (Absolute) 1.40 1.00 - 4.80 K/uL    Monos (Absolute) 0.41 0.00 - 0.85 K/uL    Eos (Absolute) 0.04 0.00 - 0.51 K/uL    Baso (Absolute) 0.03 0.00 - 0.12 K/uL    Immature Granulocytes (abs) 0.03 0.00 - 0.11 K/uL    NRBC (Absolute) 0.00 K/uL    Hepatitis B Surface Antigen  Non-Reactive Non-Reactive    Rubella IgG Antibody 15.40 IU/mL    Syphilis, Treponemal Qual Non-Reactive Non-Reactive   HIV AG/AB COMBO ASSAY SCREENING    Collection Time: 20 12:17 PM   Result Value Ref Range    HIV Ag/Ab Combo Assay Non-Reactive Non Reactive   OP Prenatal Panel-Blood Bank    Collection Time: 20 12:17 PM   Result Value Ref Range    ABO Grouping Only O     Rh Grouping Only NEG     Antibody Screen Scrn NEG    URINE MICROSCOPIC (W/UA)    Collection Time: 20 12:17 PM   Result Value Ref Range    WBC 2-5 /hpf    RBC 0-2 /hpf    Bacteria Few (A) None /hpf    Epithelial Cells Few /hpf    Hyaline Cast 0-2 /lpf   HEP C VIRUS ANTIBODY    Collection Time: 20 12:18 PM   Result Value Ref Range    Hepatitis C Antibody Non-Reactive Non-Reactive   POC UA    Collection Time: 20  9:29 AM   Result Value Ref Range    POC Color Yellow     POC Appearance Clear     POC Glucose Negative Negative mg/dL    POC Ketones Negative Negative mg/dL    POC Specific Gravity 1.025 1.005 - 1.030    POC Blood Trace-intact (A) Negative    POC Urine PH 7.0 5.0 - 8.0    POC Protein Negative Negative mg/dL    POC Nitrites Negative Negative    POC Leukocyte Esterase Trace (A) Negative        Assessment:   Divine Elise at 25w6d  Labor status:  labor with advanced cervical dilation  Obstetrical history significant for   Patient Active Problem List    Diagnosis Date Noted   • Rh negative status during pregnancy 2020   • LGSIL on Pap smear of cervix - f/u PAP 1 yr 2020   • Supervision of normal first pregnancy, antepartum 2020   .      Plan:     -Admit to labor and delivery  -IV fluids and n.p.o. for now  -Betamethasone 12 mg IM once now and repeat in 24 hours if still pregnant  -Penicillin for GBS unknown status  -We will collect GBS today  -Indomethacin 50 mg stat followed by 25 mg every 6 hours for tocolysis  -Magnesium sulfate 4 g bolus followed by 2 g/h drip for fetal neuro  protection  -Continuous fetal monitoring  -Stat growth ultrasound; although bulging membranes suspect patient might be PPROM due to fetal heart tracing with variables.  -NICU staff consult  -Patient requested I call her mother.  I discussed patient's status with her mother per her permission.  Patient's mother will come to labor and delivery for support.  -Advised patient of possibility of emergent  section for signs of maternal or fetal compromise.    Addendum at 1032  Upon arrival to the room, nurse performed GBS swab and noted wetness on the patient's thighs.  Concern for rupture of membranes.  AmniSure was already collected at the time of sterile speculum exam will await results.

## 2020-05-14 NOTE — CARE PLAN
Patient has just arrived on post partum and is progressing as expected for her history and delivery.

## 2020-05-14 NOTE — ANESTHESIA QCDR
2019 [facility] Clinical Data Registry (for Quality Improvement)     Postoperative nausea/vomiting risk protocol (Adult = 18 yrs and Pediatric 3-17 yrs)- (430 and 463)  General inhalation anesthetic (NOT TIVA) with PONV risk factors: Yes  Provision of anti-emetic therapy with at least 2 different classes of agents: Yes   Patient DID NOT receive anti-emetic therapy and reason is documented in Medical Record:  N/A    Multimodal Pain Management- (477)  Non-emergent surgery AND patient age >= 18:   Use of Multimodal Pain Management, two or more drugs and/or interventions, NOT including systemic opioids:   Exception: Documented allergy to multiple classes of analgesics:     Smoking Abstinence (404)  Patient is current smoker (cigarette, pipe, e-cig, marijuanna):   Elective Surgery:   Abstinence instructions provided prior to day of surgery:   Patient abstained from smoking on day of surgery:     Pre-Op Beta-Blocker in Isolated CABG (44)  Isolated CABG AND patient age >= 18:   Beta-blocker admin within 24 hours of surgical incision:   Exception:of medical reason(s) for not administering beta blocker within 24 hours prior to surgical incision (e.g., not  indicated,other medical reason):     PACU assessment of acute postoperative pain prior to Anesthesia Care End- Applies to Patients Age = 18- (ABG7)  Initial PACU pain score is which of the following: < 7/10  Patient unable to report pain score: N/A    Post-anesthetic transfer of care checklist/protocol to PACU/ICU- (426 and 427)  Upon conclusion of case, patient transferred to which of the following locations: PACU/Non-ICU  Use of transfer checklist/protocol:   Exclusion: Service Performed in Patient Hospital Room (and thus did not require transfer):   Unplanned admission to ICU related to anesthesia service up through end of PACU care- (MD51)  Unplanned admission to ICU (not initially anticipated at anesthesia start time): No

## 2020-05-14 NOTE — ANESTHESIA TIME REPORT
Anesthesia Start and Stop Event Times     Date Time Event    2020 1152 Ready for Procedure     1152 Anesthesia Start     1243 Anesthesia Stop        Responsible Staff  20    Name Role Begin End    Cristofer Whalen M.D. Anesth 1152 1243        Preop Diagnosis (Free Text):  Pre-op Diagnosis     Cord presentation, 25 6/7 wk IUP        Preop Diagnosis (Codes):  Diagnosis Information     Diagnosis Code(s):  delivery delivered [O82]        Post op Diagnosis  Pregnancy  Chord Prolapse EMERGENCY    Premium Reason  Non-Premium    Comments: LITA

## 2020-05-14 NOTE — ANESTHESIA PROCEDURE NOTES
Spinal Block    Date/Time: 5/14/2020 11:57 AM  Performed by: Cristofer Whalen M.D.  Authorized by: Cristofer Whalen M.D.     Patient Location:  OB  Start Time:  5/14/2020 11:57 AM  End Time:  5/14/2020 12:00 PM  Reason for Block: primary anesthetic    patient identified, IV checked, site marked, risks and benefits discussed, surgical consent, monitors and equipment checked, pre-op evaluation and timeout performed    Patient Position:  Right lateral decubitus  Prep: ChloraPrep, patient draped and sterile technique    Monitoring:  Blood pressure, continuous pulse oximetry and heart rate  Approach:  Midline  Location:  L1-2  Injection Technique:  Single-shot  Skin infiltration:  Lidocaine  Strength:  1%  Dose:  3ml  Needle Type:  Sebastian  Needle Gauge:  25 G  CSF flowing pre/post injection:  Yes  Sensory Level:  T4

## 2020-05-14 NOTE — ANESTHESIA POSTPROCEDURE EVALUATION
Patient: Divine Elise    Procedure Summary     Date:  20 Room / Location:  [facility] OR 01 / LABOR AND DELIVERY    Anesthesia Start:  1152 Anesthesia Stop:  1243    Procedure:   SECTION, PRIMARY (N/A Abdomen) Diagnosis:        delivery delivered      ( section delivered)    Surgeon:  Marisol Cabrera D.O. Responsible Provider:  Cristofer Whalen M.D.    Anesthesia Type:  spinal ASA Status:  2 - Emergent          Final Anesthesia Type: spinal  Last vitals  BP   Blood Pressure: 125/72    Temp   36.7 °C (98 °F)    Pulse   Pulse: 85   Resp   16    SpO2   99 %      Anesthesia Post Evaluation    Patient location during evaluation: PACU  Patient participation: complete - patient participated  Level of consciousness: awake and alert  Pain score: 1    Airway patency: patent  Anesthetic complications: no  Cardiovascular status: hemodynamically stable  Respiratory status: acceptable  Hydration status: euvolemic    PONV: none

## 2020-05-14 NOTE — PROGRESS NOTES
19 y.o.  EDC  (25.6 wks)     Pt presents to L&D c/o sharp lower abdominal pain that she rates 9/10. She says the pain started last night and it comes and goes at random time intervals (sometimes every few minutes and sometimes every few hours). Reports drinking 1 L of water per day usually, but barely drank any water yesterday because she had a busy day at work. Reports +FM and denies LOF/VB. POC UA=trace blood and trace leukocytes. Abdomen palpates soft and non-tender. Dr Bazzi & Dr Cabrera notified. MDs at bedside. Sterile speculum exam performed - amnisure collected. SVE 6cm. Pt to be admitted for mag start and betamethasone. Report given to GRACE Cuevas RN

## 2020-05-14 NOTE — PROGRESS NOTES
1003-Report received, care assumed. POC dicussed with pt. IV started, labs drawn, Pt moved to room S215.   1007-Pt moved bed. Gross amount of clear fluid noted. Pt appears to have SROM, Dr Cabrera noted. US will confirm PETRA, order already previously placed.   1010-Mag explained to pt. Pt states understanding, Mag bolus started  1022-INDOCIN PO given.  Dr Cabrera at bedside. POC dicussed in detail with pt. Pt mom called to come to room.   1030-POC dicussed with pt mother, questions answered. NICU notified.   1112- US at bedside. Monitors removed.   1136-US completed. Monitors reapplied. US tech shows cord presentation. Dr Cabrera called to room to review images.   1140- C/S called, Dr Cabrera dicussed need for emergent c/s with pt and pts mother. Pt states understanding and denies any questions. Dr Whalen in room, anesthesia dicussed with pt. All consents signed. Pt prepped for c/s.   1150-Pt to OR.

## 2020-05-14 NOTE — LACTATION NOTE
This note was copied from a baby's chart.  Baby 25.6 weeks in NICU, 4 hours old. Mother has been instructed on pump settings by RN, MOB reports she knows how to pump & will pump soon. Pump schedule reviewed & posted, lactation to follow-up with mother in am tomorrow.

## 2020-05-14 NOTE — OP REPORT
DATE OF SERVICE:  2020     PREOPERATIVE DIAGNOSES:  1.  Intrauterine pregnancy at 25w6d  2.   labor with cervical dilation > 6cm.  3. Cord presentation on US     POSTOPERATIVE DIAGNOSES:  1.  Intrauterine pregnancy at 25w6d  2.   labor with cervical dilation > 6cm.  3. Cord presentation on US  4. Footling breech    PROCEDURE PERFORMED:  primary low transverse  section.     SURGEON: Marisol Cabrera DO     ASSISTANT: aSra Richmond MD; due to the difficulty of the case and for optimal visualization and retraction.     ANESTHESIA:  Spinal.     ANESTHESIOLOGIST:  Cristofer Whalen MD     SPECIMEN:  cord blood for gases, placenta for pathology     ESTIMATED BLOOD LOSS:  500 mL     FINDINGS:  A viable male infant, weight 887gr, Apgars of 3 / 6 and 8 in double footling breech   presentation with clear amniotic fluid.  There was a normal uterus, tubes, and ovaries bilaterally.  Fetal position required cephalad extension of the hysterotomy by 2cm, did not extend out of the lower uterine segment, and did not rise to the level of the bilateral round ligaments.     FUTURE DELIVERY: may labor and attempt      COMPLICATIONS:  None.     PROCEDURE:  After appropriate consents were obtained, the patient was taken to the operating room where spinal  anesthesia was applied without complications.  The patient was then prepped and draped in the usual sterile manner.  Clamp test on the skin verified adequate anesthesia.  A Pfannenstiel incision was made with a scalpel 3cm superior to the pubic symphysis and extended down to the rectus fascia.  The rectus fascia was incised with the scalpel and extended bluntly. The underlying rectus muscle was  from the fascia first inferiorly and then superiorly bluntly.  The rectus muscle was  bluntly in the midline.  The peritoneum was entered bluntly in the midline. The peritoneum incision was extended superiorly and inferiorly with blunt dissection  with great care to avoid injury to underlying bowel or bladder.  Sky retractor was placed. The vesicouterine peritoneum was nicked with the scalpel a bladder flap was created bluntly.  An incision was made into the lower uterine segment transversely and the incision was extended bluntly.  Amniotomy was performed and there was noted to be clear amniotic fluid. The umbilical cord was the immediate presenting part followed by bilateral feet. The infant was delivered to the level of the bilateral scalpulae then wrapped in a towel. The left arm then the right arm was swept out of the incision. The head was difficult to deliver so decision was made to extend the midline of the incision cephalad. Surgeon fingers were placed into the incision and a 2cm extension was made in a cephalad direction with the bandage scissors. The infants head was flexed and delivered atraumatically directly into the warming bag. The mouth and nares were suctioned. The cord was milked toward the infant and was doubly clamped and cut and the infant was handed off to awaiting neonatology team. The placenta was then allowed to spontaneously deliver. The uterus was cleared of clots and debris.  Examination of the hysterotomy site revealed a 2cm extension in a cephalad direction but not beyond the lower uterine segment and not past the level of the round ligaments.  The hysterotomy incision was reapproximated with 0 PDS suture in a running locked fashion. The extension was repaired with 0 PDS in a running locking fashion. Hemostasis was noted.  The tubes and ovaries were examined and noted to be normal. The pericolic gutters were examined and any blood clots were removed.  The Sky retractor was removed. The rectus muscles were examined and hemostatic. They were re approximated with 3-0 Monocryl suture. The fascia was reapproximated with 0 Vicryl suture running.  The subcutaneous fat was irrigated and any small bleeders were bovied for  hemostasis.  The subcutaneous fat was then reapproximated with 3-0 Vicryl suture running. The skin was reapproximated with 4-0 Monocryl suture in a running fashion. Steri strips and a dressing were placed.  Sponge, needle, instrument, and lap counts were correct x2.  Patient tolerated the procedure well and went to recovery room in stable condition.        ____________________________________     Marisol Cabrera DO

## 2020-05-14 NOTE — ANESTHESIA PREPROCEDURE EVALUATION
Relevant Problems   No relevant active problems       Physical Exam    Airway   Mallampati: II  TM distance: >3 FB  Neck ROM: full       Cardiovascular - normal exam  Rhythm: regular  Rate: normal  (-) murmur     Dental - normal exam           Pulmonary - normal exam  Breath sounds clear to auscultation     Abdominal    Neurological - normal exam                 Anesthesia Plan    ASA 2- EMERGENT   ASA physical status emergent criteria: non-reassuring fetal heart tones and other (comment)    Plan - spinal   Neuraxial block will be primary anesthetic  (Chord Prolapse)          Postoperative Plan: Postoperative administration of opioids is intended.    Pertinent diagnostic labs and testing reviewed    Informed Consent:    Anesthetic plan and risks discussed with patient.

## 2020-05-14 NOTE — PROGRESS NOTES
Received patient to room S-315 via w/c.  Report received from STEPHANIE Galindo and assumed care of patient.  Nursing assessment done.  She denies pain.  She was oriented to room, call light, visiting hours, and unit routine.  Plan of care discussed.  Encouraged to call with any needs.

## 2020-05-15 LAB
ACTION RH IMMUNE GLOB 8505RHG: NORMAL
GP B STREP DNA SPEC QL NAA+PROBE: POSITIVE
IMMUNE ROSETTING TEST 8505FMH: NORMAL
NUMBER OF RH DOSES IND 8505RD: 1

## 2020-05-15 PROCEDURE — A9270 NON-COVERED ITEM OR SERVICE: HCPCS | Performed by: ANESTHESIOLOGY

## 2020-05-15 PROCEDURE — 700111 HCHG RX REV CODE 636 W/ 250 OVERRIDE (IP): Performed by: ANESTHESIOLOGY

## 2020-05-15 PROCEDURE — 85461 HEMOGLOBIN FETAL: CPT

## 2020-05-15 PROCEDURE — 700102 HCHG RX REV CODE 250 W/ 637 OVERRIDE(OP): Performed by: OBSTETRICS & GYNECOLOGY

## 2020-05-15 PROCEDURE — A9270 NON-COVERED ITEM OR SERVICE: HCPCS | Performed by: OBSTETRICS & GYNECOLOGY

## 2020-05-15 PROCEDURE — 36415 COLL VENOUS BLD VENIPUNCTURE: CPT

## 2020-05-15 PROCEDURE — 700102 HCHG RX REV CODE 250 W/ 637 OVERRIDE(OP): Performed by: ANESTHESIOLOGY

## 2020-05-15 PROCEDURE — 770002 HCHG ROOM/CARE - OB PRIVATE (112)

## 2020-05-15 PROCEDURE — 700112 HCHG RX REV CODE 229: Performed by: OBSTETRICS & GYNECOLOGY

## 2020-05-15 RX ORDER — DIPHENHYDRAMINE HCL 25 MG
25 TABLET ORAL EVERY 6 HOURS PRN
Status: DISCONTINUED | OUTPATIENT
Start: 2020-05-15 | End: 2020-05-18 | Stop reason: HOSPADM

## 2020-05-15 RX ORDER — OXYCODONE HYDROCHLORIDE 10 MG/1
10 TABLET ORAL EVERY 4 HOURS PRN
Status: DISCONTINUED | OUTPATIENT
Start: 2020-05-15 | End: 2020-05-17

## 2020-05-15 RX ORDER — ONDANSETRON 2 MG/ML
4 INJECTION INTRAMUSCULAR; INTRAVENOUS EVERY 6 HOURS PRN
Status: DISCONTINUED | OUTPATIENT
Start: 2020-05-15 | End: 2020-05-18 | Stop reason: HOSPADM

## 2020-05-15 RX ORDER — IBUPROFEN 800 MG/1
800 TABLET ORAL EVERY 8 HOURS PRN
Status: DISCONTINUED | OUTPATIENT
Start: 2020-05-15 | End: 2020-05-17

## 2020-05-15 RX ORDER — DIPHENHYDRAMINE HYDROCHLORIDE 50 MG/ML
25 INJECTION INTRAMUSCULAR; INTRAVENOUS EVERY 6 HOURS PRN
Status: DISCONTINUED | OUTPATIENT
Start: 2020-05-15 | End: 2020-05-18 | Stop reason: HOSPADM

## 2020-05-15 RX ORDER — OXYCODONE HYDROCHLORIDE 5 MG/1
5 TABLET ORAL EVERY 4 HOURS PRN
Status: DISCONTINUED | OUTPATIENT
Start: 2020-05-15 | End: 2020-05-17

## 2020-05-15 RX ORDER — ACETAMINOPHEN 325 MG/1
975 TABLET ORAL EVERY 4 HOURS PRN
Status: DISCONTINUED | OUTPATIENT
Start: 2020-05-15 | End: 2020-05-17

## 2020-05-15 RX ORDER — ONDANSETRON 4 MG/1
4 TABLET, ORALLY DISINTEGRATING ORAL EVERY 6 HOURS PRN
Status: DISCONTINUED | OUTPATIENT
Start: 2020-05-15 | End: 2020-05-18 | Stop reason: HOSPADM

## 2020-05-15 RX ADMIN — IBUPROFEN 800 MG: 800 TABLET, FILM COATED ORAL at 13:26

## 2020-05-15 RX ADMIN — ACETAMINOPHEN 1000 MG: 500 TABLET ORAL at 03:11

## 2020-05-15 RX ADMIN — KETOROLAC TROMETHAMINE 30 MG: 30 INJECTION, SOLUTION INTRAMUSCULAR at 00:11

## 2020-05-15 RX ADMIN — DOCUSATE SODIUM 100 MG: 100 CAPSULE, LIQUID FILLED ORAL at 03:21

## 2020-05-15 RX ADMIN — OXYCODONE 5 MG: 5 TABLET ORAL at 09:33

## 2020-05-15 RX ADMIN — OXYCODONE 5 MG: 5 TABLET ORAL at 20:38

## 2020-05-15 RX ADMIN — KETOROLAC TROMETHAMINE 30 MG: 30 INJECTION, SOLUTION INTRAMUSCULAR at 06:34

## 2020-05-15 RX ADMIN — OXYCODONE 5 MG: 5 TABLET ORAL at 16:32

## 2020-05-15 RX ADMIN — ACETAMINOPHEN 1000 MG: 500 TABLET ORAL at 09:33

## 2020-05-15 RX ADMIN — OXYCODONE 5 MG: 5 TABLET ORAL at 03:21

## 2020-05-15 RX ADMIN — DOCUSATE SODIUM 100 MG: 100 CAPSULE, LIQUID FILLED ORAL at 20:38

## 2020-05-15 NOTE — CARE PLAN
Problem: Altered physiologic condition related to postoperative  delivery  Goal: Patient physiologically stable as evidenced by normal lochia, palpable uterine involution and vital signs within normal limits  Outcome: PROGRESSING AS EXPECTED  Note: Fundus firm. Lochia light. Vital signs WDL.       Problem: Potential for postpartum infection related to surgical incision, compromised uterine condition, urinary tract or respiratory compromise  Goal: Patient will be afebrile and free from signs and symptoms of infection  Outcome: PROGRESSING AS EXPECTED  Note: Patient is afebrile. Incision approximated, dressing in place. No redness or drainage noted. No signs and symptoms of infection noted.

## 2020-05-15 NOTE — PROGRESS NOTES
Assessment completed. WDL. Vital signs stable. Patient progressing according to plan of care.  Pt states she is voiding without difficulty. Patient claims to have good pain relief with prn pain medications. Pt states she will call when she needs prn pain medication. Pt denies any other issues at this time. Educated pt about pumping every 3 hours. Pt encouraged to call for any needs.

## 2020-05-15 NOTE — PROGRESS NOTES
Obstetrics & Gynecology Post-Delivery Progress Note    Date of Service  5/15/2020    19 y.o.  s/p  for  labor while fetus breech with cord presentation.  Delivery date: 20    Events  No events overnight. Divine had martines removed shortly before MD arrival to room, and has not yet had spontaneous void. Otherwise without concerns this morning.     Subjective  Pain: Yes,  controlled  Bleeding: lochia minimal  Tolerating PO: yes  Voiding: martines discontinued, awaiting spontaneous void  Ambulating: no  Passing flatus: Yes  Feeding: breastfeeding though pumping as infant in NICU    Objective  24hr VS:  Temp:  [36.3 °C (97.4 °F)-37.2 °C (98.9 °F)] 36.6 °C (97.9 °F)  Pulse:  [69-94] 85  Resp:  [16-20] 18  BP: ()/(47-89) 106/89  SpO2:  [92 %-100 %] 97 %    Physical Exam  General: Well appearing woman in no acute distress   HEENT: Head normocephalic, eyes PERRLA  Cardiovascular: RRR, no murmurs, gallops, or rubs  Pulmonary: CTAB, symmetrical chest expansion, no rales, rhonchi, or wheezes  Abdominal: Present bowel sounds, mildly tender to palpation, no guarding or rigidity, uterine fundus below level of umbilicus, surgical incision site covered with dressing which is clean, dry, and intact   Genitourinary: Exam deferred  Extremities: Moves all spontaneously, non-tender to palpation, trace pedal edema  Neurological: Alert and oriented    Labs:  Results for orders placed or performed during the hospital encounter of 20   CBC WITHOUT DIFFERENTIAL   Result Value Ref Range    WBC 14.0 (H) 4.8 - 10.8 K/uL    RBC 3.97 (L) 4.20 - 5.40 M/uL    Hemoglobin 12.7 12.0 - 16.0 g/dL    Hematocrit 36.9 (L) 37.0 - 47.0 %    MCV 92.9 81.4 - 97.8 fL    MCH 32.0 27.0 - 33.0 pg    MCHC 34.4 33.6 - 35.0 g/dL    RDW 45.8 35.9 - 50.0 fL    Platelet Count 178 164 - 446 K/uL    MPV 10.2 9.0 - 12.9 fL   AMNISURE ROM ASSAY   Result Value Ref Range    AmniSure ROM Negative Negative   Histology Request   Result Value  Ref Range    Pathology Request Sent to Histo    CBC without differential   Result Value Ref Range    WBC 18.7 (H) 4.8 - 10.8 K/uL    RBC 3.86 (L) 4.20 - 5.40 M/uL    Hemoglobin 12.1 12.0 - 16.0 g/dL    Hematocrit 34.6 (L) 37.0 - 47.0 %    MCV 89.6 81.4 - 97.8 fL    MCH 31.3 27.0 - 33.0 pg    MCHC 35.0 33.6 - 35.0 g/dL    RDW 43.1 35.9 - 50.0 fL    Platelet Count 194 164 - 446 K/uL    MPV 10.1 9.0 - 12.9 fL   MOM RHHDN/RHOGAM   Result Value Ref Range    Immune Rosetting Test NEG     Number Of Rh Doses Indicated 1    POC UA   Result Value Ref Range    POC Color Yellow     POC Appearance Clear     POC Glucose Negative Negative mg/dL    POC Ketones Negative Negative mg/dL    POC Specific Gravity 1.025 1.005 - 1.030    POC Blood Trace-intact (A) Negative    POC Urine PH 7.0 5.0 - 8.0    POC Protein Negative Negative mg/dL    POC Nitrites Negative Negative    POC Leukocyte Esterase Trace (A) Negative     Medications  acetaminophen, 1,000 mg, Oral, Q6HR  ketorolac, 30 mg, Intravenous, Q6HR      oxyCODONE immediate-release, oxyCODONE immediate release, HYDROmorphone, HYDROmorphone, diphenhydrAMINE **OR** diphenhydrAMINE **OR** naloxone 0.4 mg in 1000 mL infusion, ePHEDrine, ondansetron, diphenhydrAMINE, LR, oxytocin, misoprostol, docusate sodium, simethicone      Assessment/Plan  Divine Elise is a 19 y.o.yo  s/p postop day #1 s/p  for  labor while fetus breech with cord presentation.    - Post care: Awaiting spontaneous void  - Pain: controlled  - Rh-, Rubella Immune  - Method of Feeding: plans to breastfeed  - Method of Contraception: Will discuss with patient prior to discharge  VTE prophylaxis: SCDs until ambulatory    - Disposition: likely home postop day 2-3    Jens Salazar M.D.  Family Medicine Resident  PGY-1

## 2020-05-15 NOTE — PROGRESS NOTES
Assumed care of patient at bedside report from NOC RN. Updated on POC. Patient currently A & O x 4; on room air; up self; with complaints of 4/10 abdominal/ incisional pain. Mother at bedside. No infant at bedside. Assessment completed.Call light within reach. Whiteboard updated. Fall precautions in place. Bed locked and in lowest position. All questions answered. No other needs indicated at this time.

## 2020-05-15 NOTE — CARE PLAN
Problem: Communication  Goal: The ability to communicate needs accurately and effectively will improve  Outcome: PROGRESSING AS EXPECTED  Intervention: [place] patient and significant other/support system to call light to alert staff of needs  Flowsheets (Taken 5/15/2020 0883)  Oriented to:: All of the Following : Location of Bathroom, Visiting Policy, Unit Routine, Call Light and Bedside Controls, Bedside Rail Policy, Smoking Policy, Rights and Responsibilities, Bedside Report, and Patient Education Notebook  Intervention: Educate patient and significant other/support system about the plan of care, procedures, treatments, medications and allow for questions  Note: Pt and support person oriented to unit and call light system. Pt calls appropriately for assistance. Discussed daily POC. Pt asked appropriate questions, verbalized understanding     Problem: Urinary Elimination:  Goal: Ability to reestablish a normal urinary elimination pattern will improve  Outcome: PROGRESSING AS EXPECTED  Intervention: Assess and monitor for signs and symptoms of urinary retention  Note: Discussed importance of voiding after martines removal. Pt and family verbalized understanding. Encouraged oral intake.

## 2020-05-15 NOTE — PROGRESS NOTES
Per pt request, called NICU to determine if grandmother could visit infant.  Per NICU, due to COVID, only MOB or FOB can visit infant at this time, no exceptions at this time.    Pt and family updated.

## 2020-05-16 PROCEDURE — A9270 NON-COVERED ITEM OR SERVICE: HCPCS | Performed by: OBSTETRICS & GYNECOLOGY

## 2020-05-16 PROCEDURE — 3E0234Z INTRODUCTION OF SERUM, TOXOID AND VACCINE INTO MUSCLE, PERCUTANEOUS APPROACH: ICD-10-PCS | Performed by: OBSTETRICS & GYNECOLOGY

## 2020-05-16 PROCEDURE — 90471 IMMUNIZATION ADMIN: CPT

## 2020-05-16 PROCEDURE — 770002 HCHG ROOM/CARE - OB PRIVATE (112)

## 2020-05-16 PROCEDURE — 90715 TDAP VACCINE 7 YRS/> IM: CPT | Performed by: OBSTETRICS & GYNECOLOGY

## 2020-05-16 PROCEDURE — 700102 HCHG RX REV CODE 250 W/ 637 OVERRIDE(OP): Performed by: OBSTETRICS & GYNECOLOGY

## 2020-05-16 PROCEDURE — 700111 HCHG RX REV CODE 636 W/ 250 OVERRIDE (IP): Performed by: OBSTETRICS & GYNECOLOGY

## 2020-05-16 RX ADMIN — IBUPROFEN 800 MG: 800 TABLET, FILM COATED ORAL at 15:35

## 2020-05-16 RX ADMIN — OXYCODONE HYDROCHLORIDE 10 MG: 10 TABLET ORAL at 20:24

## 2020-05-16 RX ADMIN — OXYCODONE 5 MG: 5 TABLET ORAL at 15:35

## 2020-05-16 RX ADMIN — IBUPROFEN 800 MG: 800 TABLET, FILM COATED ORAL at 01:24

## 2020-05-16 RX ADMIN — TETANUS TOXOID, REDUCED DIPHTHERIA TOXOID AND ACELLULAR PERTUSSIS VACCINE, ADSORBED 0.5 ML: 5; 2.5; 8; 8; 2.5 SUSPENSION INTRAMUSCULAR at 22:15

## 2020-05-16 RX ADMIN — ACETAMINOPHEN 975 MG: 325 TABLET, FILM COATED ORAL at 20:23

## 2020-05-16 RX ADMIN — OXYCODONE 5 MG: 5 TABLET ORAL at 07:46

## 2020-05-16 RX ADMIN — OXYCODONE 5 MG: 5 TABLET ORAL at 01:24

## 2020-05-16 ASSESSMENT — EDINBURGH POSTNATAL DEPRESSION SCALE (EPDS)
I HAVE BEEN ABLE TO LAUGH AND SEE THE FUNNY SIDE OF THINGS: AS MUCH AS I ALWAYS COULD
I HAVE BEEN ANXIOUS OR WORRIED FOR NO GOOD REASON: NO, NOT AT ALL
I HAVE BLAMED MYSELF UNNECESSARILY WHEN THINGS WENT WRONG: NO, NEVER
THE THOUGHT OF HARMING MYSELF HAS OCCURRED TO ME: NEVER
I HAVE BEEN SO UNHAPPY THAT I HAVE BEEN CRYING: ONLY OCCASIONALLY
I HAVE FELT SAD OR MISERABLE: NO, NOT AT ALL
THINGS HAVE BEEN GETTING ON TOP OF ME: NO, I HAVE BEEN COPING AS WELL AS EVER
I HAVE FELT SCARED OR PANICKY FOR NO GOOD REASON: NO, NOT AT ALL
I HAVE LOOKED FORWARD WITH ENJOYMENT TO THINGS: AS MUCH AS I EVER DID
I HAVE BEEN SO UNHAPPY THAT I HAVE HAD DIFFICULTY SLEEPING: NOT AT ALL

## 2020-05-16 NOTE — PROGRESS NOTES
Assumed care of patient. Assessment complete. Fundus is firm, with light to scant lochia rubra. Pt reports no clots at this time. Pain level is 5/10, would like medication when it becomes available. Pump settings discussed, pt reports no issues with settings. States she sets suction from 40-45%, education provided, states suction is comfortable and has no pain. Bed is locked and in low position. Call light left within reach and encouraged to call for any needs if necessary.

## 2020-05-16 NOTE — PROGRESS NOTES
Report received from Theresa BROWN. Assumed care. Pain under-control. Instructed patient to call as needed. All questions answered.

## 2020-05-16 NOTE — CARE PLAN
Problem: Potential for postpartum infection related to surgical incision, compromised uterine condition, urinary tract or respiratory compromise  Goal: Patient will be afebrile and free from signs and symptoms of infection  Outcome: PROGRESSING AS EXPECTED  Note: No s/s of infection noted on assessment.      Problem: Potential knowledge deficit related to lack of understanding of self and  care  Goal: Patient will verbalize understanding of self and infant care  Outcome: PROGRESSING AS EXPECTED  Note: Pt states she goes down for care times and is very involved with infant. NICU number on board. Injoy given.

## 2020-05-16 NOTE — PROGRESS NOTES
Received report from Komal BROWN. Pt resting in bed, infant in NICU, no further needs at this time.

## 2020-05-16 NOTE — LACTATION NOTE
@[id number] lactation visit, met with MOB and grandmother of baby, mother states she has been using breast pump independently, she denies pain when she pumps and states she is getting increasing volumes of milk when she pumps, verified understanding of proper pump use and settings, mother instructed to decrease speed from 80-60 after 2 minutes and to set suction to highest level that is still comfortable, reinforced the importance of pumping frequently and on a schedule    Plan:  Q 2-3 hours pump for 15 minutes  Leave time for a 5 hour stretch of sleep at night  Pump at least 8 times every 24 hours    Mother has WIC in [place], instructed to call her WIC counselor on Monday to get a pump from [place] for home use, mother was also educated that she can rent pump from the hospital if unable to get pump from WIC, pump rental information provided, staff can order her a hand pump if she is unable to either get a pump from WIC or rent a pump     Written and verbal information provided on outpatient breastfeeding assistance available at [facility] after discharge and encouraged to call to schedule consult as needed, informed that [facility] is on hold for the time being but if interested in attending to check the hospital web site for information on when it will resume, zoom meeting information provided as well    Encouraged to call for assistance as needed

## 2020-05-16 NOTE — PROGRESS NOTES
Obstetrics & Gynecology Post-Delivery Progress Note    Date of Service  2020    19 y.o.  s/p  for  labor while fetus breech with cord presentation.  Delivery date: 20    Events  No events overnight. Divine reports doing okay today other than pain. Has been visiting her infant in the NICU frequently.     Subjective  Pain: Yes,  controlled  Bleeding: lochia minimal  Tolerating PO: yes  Voiding: without difficulty  Ambulating: yes  Passing flatus: Yes  Feeding: breastfeeding and pumping, infant in NICU    Objective  24hr VS:  Temp:  [36.2 °C (97.1 °F)-37.7 °C (99.8 °F)] 36.2 °C (97.1 °F)  Pulse:  [67-91] 67  Resp:  [16-18] 18  BP: (109-120)/(64-69) 120/67  SpO2:  [95 %-97 %] 97 %    Physical Exam  General: Well appearing woman in no acute distress   HEENT: Head normocephalic, eyes PERRLA  Cardiovascular: RRR, no murmurs, gallops, or rubs  Pulmonary: CTAB, symmetrical chest expansion, no rales, rhonchi, or wheezes  Abdominal: Present bowel sounds, mildly tender to palpation, no guarding or rigidity, uterine fundus below level of umbilicus, surgical incision site covered with dressing which is clean, dry, and intact   Genitourinary: Exam deferred  Extremities: Moves all spontaneously, non-tender to palpation, trace pedal edema  Neurological: Alert and oriented    Labs:  Results for orders placed or performed during the hospital encounter of 20   CBC WITHOUT DIFFERENTIAL   Result Value Ref Range    WBC 14.0 (H) 4.8 - 10.8 K/uL    RBC 3.97 (L) 4.20 - 5.40 M/uL    Hemoglobin 12.7 12.0 - 16.0 g/dL    Hematocrit 36.9 (L) 37.0 - 47.0 %    MCV 92.9 81.4 - 97.8 fL    MCH 32.0 27.0 - 33.0 pg    MCHC 34.4 33.6 - 35.0 g/dL    RDW 45.8 35.9 - 50.0 fL    Platelet Count 178 164 - 446 K/uL    MPV 10.2 9.0 - 12.9 fL   AMNISURE ROM ASSAY   Result Value Ref Range    AmniSure ROM Negative Negative   GRP B STREP, BY PCR (JASMINE BROTH)    Specimen: Vaginal; Genital   Result Value Ref Range    Strep Gp B  DNA PCR POSITIVE (A) Negative   Histology Request   Result Value Ref Range    Pathology Request Sent to Histo    CBC without differential   Result Value Ref Range    WBC 18.7 (H) 4.8 - 10.8 K/uL    RBC 3.86 (L) 4.20 - 5.40 M/uL    Hemoglobin 12.1 12.0 - 16.0 g/dL    Hematocrit 34.6 (L) 37.0 - 47.0 %    MCV 89.6 81.4 - 97.8 fL    MCH 31.3 27.0 - 33.0 pg    MCHC 35.0 33.6 - 35.0 g/dL    RDW 43.1 35.9 - 50.0 fL    Platelet Count 194 164 - 446 K/uL    MPV 10.1 9.0 - 12.9 fL   MOM RHHDN/RHOGAM   Result Value Ref Range    Immune Rosetting Test NEG     Number Of Rh Doses Indicated 1    ACTION: RH IMMUNE GLOBULIN   Result Value Ref Range    Action  Rh Immune Globulin [id number]       issued       1 Syrng    POC UA   Result Value Ref Range    POC Color Yellow     POC Appearance Clear     POC Glucose Negative Negative mg/dL    POC Ketones Negative Negative mg/dL    POC Specific Gravity 1.025 1.005 - 1.030    POC Blood Trace-intact (A) Negative    POC Urine PH 7.0 5.0 - 8.0    POC Protein Negative Negative mg/dL    POC Nitrites Negative Negative    POC Leukocyte Esterase Trace (A) Negative     Medications     PRN medications: ibuprofen, acetaminophen, oxyCODONE immediate-release, oxyCODONE immediate release, ondansetron **OR** ondansetron, diphenhydrAMINE **OR** diphenhydrAMINE, LR, oxytocin, misoprostol, docusate sodium, simethicone      Assessment/Plan  Divine Elise is a 19 y.o.yo  s/p postop day #2 s/p  for  labor while fetus breech with cord presentation.    - Post care: meeting all goals  - Pain: controlled  - Rh-, Rubella Immune  - Method of Feeding: plans to breastfeed  - Method of Contraception: POPs  VTE prophylaxis: none indicated as ambulatory    - Disposition: likely home postop day 3     Jens Salazar M.D.  Family Medicine Resident  PGY-1

## 2020-05-16 NOTE — PROGRESS NOTES
Assumed care of patient at bedside report from NOC RN. Updated on POC. Patient currently A & O x 4; on room air; up self; without complaints of acute pain. Grandmother at bedside as support. Call light within reach. Whiteboard updated. Bed locked and in lowest position. All questions answered. No other needs indicated at this time.

## 2020-05-17 LAB
ERYTHROCYTE [DISTWIDTH] IN BLOOD BY AUTOMATED COUNT: 47.5 FL (ref 35.9–50)
HCT VFR BLD AUTO: 36.1 % (ref 37–47)
HGB BLD-MCNC: 11.8 G/DL (ref 12–16)
MCH RBC QN AUTO: 31.2 PG (ref 27–33)
MCHC RBC AUTO-ENTMCNC: 32.7 G/DL (ref 33.6–35)
MCV RBC AUTO: 95.5 FL (ref 81.4–97.8)
PLATELET # BLD AUTO: 206 K/UL (ref 164–446)
PMV BLD AUTO: 9.4 FL (ref 9–12.9)
RBC # BLD AUTO: 3.78 M/UL (ref 4.2–5.4)
WBC # BLD AUTO: 10.8 K/UL (ref 4.8–10.8)

## 2020-05-17 PROCEDURE — 770002 HCHG ROOM/CARE - OB PRIVATE (112)

## 2020-05-17 PROCEDURE — 36415 COLL VENOUS BLD VENIPUNCTURE: CPT

## 2020-05-17 PROCEDURE — A9270 NON-COVERED ITEM OR SERVICE: HCPCS | Performed by: OBSTETRICS & GYNECOLOGY

## 2020-05-17 PROCEDURE — 85027 COMPLETE CBC AUTOMATED: CPT

## 2020-05-17 PROCEDURE — 700102 HCHG RX REV CODE 250 W/ 637 OVERRIDE(OP): Performed by: OBSTETRICS & GYNECOLOGY

## 2020-05-17 PROCEDURE — 700102 HCHG RX REV CODE 250 W/ 637 OVERRIDE(OP): Performed by: STUDENT IN AN ORGANIZED HEALTH CARE EDUCATION/TRAINING PROGRAM

## 2020-05-17 PROCEDURE — A9270 NON-COVERED ITEM OR SERVICE: HCPCS | Performed by: STUDENT IN AN ORGANIZED HEALTH CARE EDUCATION/TRAINING PROGRAM

## 2020-05-17 RX ORDER — OXYCODONE HYDROCHLORIDE 5 MG/1
5 TABLET ORAL EVERY 4 HOURS PRN
Status: DISCONTINUED | OUTPATIENT
Start: 2020-05-17 | End: 2020-05-18 | Stop reason: HOSPADM

## 2020-05-17 RX ORDER — ACETAMINOPHEN 500 MG
1000 TABLET ORAL EVERY 6 HOURS
Status: DISCONTINUED | OUTPATIENT
Start: 2020-05-17 | End: 2020-05-18 | Stop reason: HOSPADM

## 2020-05-17 RX ORDER — IBUPROFEN 800 MG/1
800 TABLET ORAL EVERY 8 HOURS
Status: DISCONTINUED | OUTPATIENT
Start: 2020-05-17 | End: 2020-05-18 | Stop reason: HOSPADM

## 2020-05-17 RX ADMIN — IBUPROFEN 800 MG: 800 TABLET, FILM COATED ORAL at 22:00

## 2020-05-17 RX ADMIN — ACETAMINOPHEN 1000 MG: 500 TABLET, FILM COATED ORAL at 22:03

## 2020-05-17 RX ADMIN — ACETAMINOPHEN 1000 MG: 500 TABLET, FILM COATED ORAL at 10:46

## 2020-05-17 RX ADMIN — OXYCODONE HYDROCHLORIDE 10 MG: 10 TABLET ORAL at 06:13

## 2020-05-17 RX ADMIN — ACETAMINOPHEN 1000 MG: 500 TABLET, FILM COATED ORAL at 16:26

## 2020-05-17 RX ADMIN — IBUPROFEN 800 MG: 800 TABLET, FILM COATED ORAL at 14:03

## 2020-05-17 RX ADMIN — IBUPROFEN 800 MG: 800 TABLET, FILM COATED ORAL at 06:13

## 2020-05-17 NOTE — CARE PLAN
Problem: Potential for postpartum infection related to surgical incision, compromised uterine condition, urinary tract or respiratory compromise  Goal: Patient will be afebrile and free from signs and symptoms of infection  Outcome: PROGRESSING AS EXPECTED  Note: Afebrile. VSS. Abdominal incision dsg C/D/I. Will continue to monitor.     Problem: Alteration in comfort related to surgical incision and/or after birth pains  Goal: Patient is able to ambulate, care for self and infant with acceptable pain level  Outcome: PROGRESSING AS EXPECTED  Note: Ambulatory. Demonstrates self care.     Problem: Alteration in comfort related to surgical incision and/or after birth pains  Goal: Patient verbalizes acceptable pain level  Outcome: PROGRESSING AS EXPECTED  Note: Medicated with oxycodone and tylenol for c/o back and incisional pain. Discussed POC with pain mgmt.

## 2020-05-17 NOTE — PROGRESS NOTES
Assessment completed, fundus firm, lochia light. Abdominal incision C/D/I.  POC reviewed, verbalized understanding. Medicated with pain meds, and encouraged to call if needed additional pain intervention.

## 2020-05-17 NOTE — LACTATION NOTE
Baby remains in NICU, mother states she is continuing to use breast pump independently, she denies pain when she pumps and declines offer for any assistance at this time, pumping schedule remains unchanged, mother plans to call WIC tomorrow morning to find out if she can get pump for home use, if not able to get WIC pump mother plans to rent pump from hospital    Encouraged to call for assistance as needed

## 2020-05-17 NOTE — PROGRESS NOTES
0700- report received from Maribell BROWN, POC discussed.  0800- assessment completed; pt states pain is well controlled at this time. Pt denies any questions regarding pumping or plan of care. Pt instructed to call RN with any needs or questions throughout the day.  1600- report to Charu

## 2020-05-17 NOTE — PROGRESS NOTES
Post Partum Progress Note    Name:   Divine Elise   Date/Time:  2020 - 6:31 AM  Chief Admitting Dx:  Pregnancy   labor  Delivery Type:   for Cord presentation  Post-Op/Post Partum Days #:  3    Subjective:  Abdominal pain: no  Ambulating:   yes  Tolerating liquids:  yes  Tolerating food:  yes common adult  Flatus:   yes  BM:    yes  Bleeding:   with a small amount of bleeding  Voiding:   yes  Dizziness:   no  Feeding:   Pumping for NICU    Vitals:    05/15/20 1800 20 0600 20 1800 20 0600   BP: 113/69 120/67 106/61 108/67   Pulse: 79 67 83 76   Resp:  18   Temp: 36.8 °C (98.3 °F) 36.2 °C (97.1 °F) 37.1 °C (98.8 °F) 36.9 °C (98.5 °F)   TempSrc: Temporal Temporal Temporal Temporal   SpO2: 95% 97% 93% 97%   Weight:       Height:           Exam:  Breast: Tenderness no  Abdomen: Abdomen soft, non-tender. BS normal. No masses,  No organomegaly  Fundal Tenderness:  no  Fundus Firm: yes  Incision: dry and intact  Below umbilicus: yes  Perineum: perineum intact  Lochia: mild  Extremities: Normal extremities, peripheral pulses and reflexes normal, no edema, redness or tenderness in the calves or thighs    Meds:  Current Facility-Administered Medications   Medication Dose   • ibuprofen (MOTRIN) tablet 800 mg  800 mg   • acetaminophen (TYLENOL) tablet 975 mg  975 mg   • oxyCODONE immediate-release (ROXICODONE) tablet 5 mg  5 mg   • oxyCODONE immediate release (ROXICODONE) tablet 10 mg  10 mg   • ondansetron (ZOFRAN) syringe/vial injection 4 mg  4 mg    Or   • ondansetron (ZOFRAN ODT) dispertab 4 mg  4 mg   • diphenhydrAMINE (BENADRYL) tablet/capsule 25 mg  25 mg    Or   • diphenhydrAMINE (BENADRYL) injection 25 mg  25 mg   • LR infusion     • PRN oxytocin (PITOCIN) (20 Units/1000 mL) PRN for excessive uterine bleeding - See Admin Instr  125-999 mL/hr   • miSOPROStol (CYTOTEC) tablet 800 mcg  800 mcg   • docusate sodium (COLACE) capsule 100 mg  100 mg   • simethicone (MYLICON)  chewable tab 80 mg  80 mg       Labs:   Recent Labs     20  0950 20   WBC 14.0* 18.7*   RBC 3.97* 3.86*   HEMOGLOBIN 12.7 12.1   HEMATOCRIT 36.9* 34.6*   MCV 92.9 89.6   MCH 32.0 31.3   MCHC 34.4 35.0   RDW 45.8 43.1   PLATELETCT 178 194   MPV 10.2 10.1       Assessment:  Chief Admitting Dx:  Pregnancy   labor  Delivery Type:   for cord presentation  Tubal Ligation:  no    Plan:  Continue routine post partum care.  Pt live in [facility] and would like to stay one more day in order to be near baby in NICU, anticipate d/c tomorrow     GAURI Reyes.

## 2020-05-18 VITALS
HEART RATE: 93 BPM | RESPIRATION RATE: 20 BRPM | HEIGHT: 65 IN | SYSTOLIC BLOOD PRESSURE: 136 MMHG | OXYGEN SATURATION: 96 % | TEMPERATURE: 98.2 F | DIASTOLIC BLOOD PRESSURE: 72 MMHG | WEIGHT: 212 LBS | BODY MASS INDEX: 35.32 KG/M2

## 2020-05-18 PROCEDURE — A9270 NON-COVERED ITEM OR SERVICE: HCPCS | Performed by: STUDENT IN AN ORGANIZED HEALTH CARE EDUCATION/TRAINING PROGRAM

## 2020-05-18 PROCEDURE — 700102 HCHG RX REV CODE 250 W/ 637 OVERRIDE(OP): Performed by: STUDENT IN AN ORGANIZED HEALTH CARE EDUCATION/TRAINING PROGRAM

## 2020-05-18 RX ORDER — ACETAMINOPHEN AND CODEINE PHOSPHATE 120; 12 MG/5ML; MG/5ML
1 SOLUTION ORAL DAILY
Qty: 28 TAB | Refills: 5 | Status: SHIPPED | OUTPATIENT
Start: 2020-05-18 | End: 2020-10-06

## 2020-05-18 RX ORDER — PSEUDOEPHEDRINE HCL 30 MG
100 TABLET ORAL 2 TIMES DAILY PRN
Qty: 10 CAP | Refills: 0 | Status: SHIPPED | OUTPATIENT
Start: 2020-05-18 | End: 2020-05-23

## 2020-05-18 RX ADMIN — ACETAMINOPHEN 1000 MG: 500 TABLET, FILM COATED ORAL at 04:26

## 2020-05-18 RX ADMIN — IBUPROFEN 800 MG: 800 TABLET, FILM COATED ORAL at 06:11

## 2020-05-18 RX ADMIN — IBUPROFEN 800 MG: 800 TABLET, FILM COATED ORAL at 12:54

## 2020-05-18 NOTE — PROGRESS NOTES
Assumed care. Bedside report from NOC RN. Awake, resting in bed and in no distress. Bed in low position, call light w/in reach.

## 2020-05-18 NOTE — PROGRESS NOTES
Discharge to home with mother in stable condition. Pt in communication with NICU staff re:visits.

## 2020-05-18 NOTE — LACTATION NOTE
"Met with MOB for a lactation follow up visit.  MOB is preparing for discharge.  MOB stated she has [place] and is seen at the office in [place], [place].  MOB stated she has placed a call into [facility] to inquire about obtaining a breast pump from them for home use.  MOB stated she is aware that she has the ability to rent a hospital grade breast pump from the [facility] and stated she will do this if [facility] is unable to provide her with a breast pump. Will order MOB a manual breast pump to go home with.    Provided MOB with additional Snappie bottles.    MOB stated she is pumping as instructed and is receiving approximately 4 oz of breast milk per pumping session.  MOB denied pain and/or rubbing of nipples with pump use.  This LC offered to assess flange fit, but MOB declined.  MOB reported, \"Everything is going fine.\"  MOB informed to take all pump parts home with her and was informed of the business hours for the [facility].    Pumping Schedule:  MOB was advised to continue to pump as instructed for a minimum of 8 times in a 24 hour period taking a 5 hour stretch between two pumping sessions at night to allow for sleep.    MOB verbalized understanding of all information provided to her and denied having any further questions at this time.  MOB was encouraged to call for lactation support as needed prior to discharge.  "

## 2020-05-18 NOTE — DISCHARGE SUMMARY
Discharge Summary:      Divine Elise      Admit Date:   2020  Discharge Date:  2020     Admitting diagnosis:  Pregnancy   labor  Discharge Diagnosis: Status post primary  section  Pregnancy Complications:  labor with breech footling and cord presentation       History:  Past Medical History:   Diagnosis Date   • Rh negative status during pregnancy 2020     OB History    Para Term  AB Living   1 1   1   1   SAB TAB Ectopic Molar Multiple Live Births           0 1      # Outcome Date GA Lbr Noel/2nd Weight Sex Delivery Anes PTL Lv   1  20 25w6d  0.887 kg (1 lb 15.3 oz) M CS-LTranv Spinal Y LAURA        Patient has no known allergies.  Patient Active Problem List    Diagnosis Date Noted   • Rh negative status during pregnancy 2020   • LGSIL on Pap smear of cervix - f/u PAP 1 yr 2020   • Supervision of normal first pregnancy, antepartum 2020        Hospital Course:   19 y.o. now , was admitted in  labor and found to have infant in footling breech with cord presentation. Patient underwent urgent  delivery, giving birth to a male infant at 25 weeks 6 days GA. Patient postpartum course was unremarkable, with progressive advancement in diet, ambulation and toleration of oral analgesia. Patient without complaints today and desires discharge.      Abdominal pain: Controlled  Ambulating: Yes  tolerating liquids: Yes  tolerating regular diet: Yes  Flatus: Yes  BM: Yes  Bleeding: Light  Voiding: Yes  Dizziness: Denies  Breast feeding: Yes- pumping      Vitals:    20 0600 20 1800 20 0600 20 1800   BP: 120/67 106/61 108/67 108/63   Pulse: 67 83 76 92   Resp: 18 18 18    Temp: 36.2 °C (97.1 °F) 37.1 °C (98.8 °F) 36.9 °C (98.5 °F) 36.7 °C (98.1 °F)   TempSrc: Temporal Temporal Temporal Temporal   SpO2: 97% 93% 97% 98%   Weight:       Height:           Current Facility-Administered Medications   Medication  Dose   • acetaminophen (TYLENOL) tablet 1,000 mg  1,000 mg   • ibuprofen (MOTRIN) tablet 800 mg  800 mg   • oxyCODONE immediate-release (ROXICODONE) tablet 5 mg  5 mg   • ondansetron (ZOFRAN) syringe/vial injection 4 mg  4 mg    Or   • ondansetron (ZOFRAN ODT) dispertab 4 mg  4 mg   • diphenhydrAMINE (BENADRYL) tablet/capsule 25 mg  25 mg    Or   • diphenhydrAMINE (BENADRYL) injection 25 mg  25 mg   • LR infusion     • PRN oxytocin (PITOCIN) (20 Units/1000 mL) PRN for excessive uterine bleeding - See Admin Instr  125-999 mL/hr   • miSOPROStol (CYTOTEC) tablet 800 mcg  800 mcg   • docusate sodium (COLACE) capsule 100 mg  100 mg   • simethicone (MYLICON) chewable tab 80 mg  80 mg       Exam:  Vitals:    05/16/20 0600 05/16/20 1800 05/17/20 0600 05/17/20 1800   BP: 120/67 106/61 108/67 108/63   Pulse: 67 83 76 92   Resp: 18 18 18 17   Temp: 36.2 °C (97.1 °F) 37.1 °C (98.8 °F) 36.9 °C (98.5 °F) 36.7 °C (98.1 °F)   TempSrc: Temporal Temporal Temporal Temporal   SpO2: 97% 93% 97% 98%   Weight:       Height:         General: Well appearing woman in no acute distress   HEENT: Head normocephalic, eyes PERRLA  Cardiovascular: RRR, no murmurs, gallops, or rubs  Pulmonary: CTAB, symmetrical chest expansion, no rales, rhonchi, or wheezes  Abdominal: Present bowel sounds, non-tender to palpation, no guarding or rigidity, uterine fundus below level of umbilicus, surgical incision site covered with dressing which is clean, dry, and intact   Genitourinary: Exam deferred  Extremities: Moves all spontaneously, non-tender to palpation, trace pedal edema  Neurological: Alert and oriented       Labs:  Recent Labs     05/17/20  0729   WBC 10.8   RBC 3.78*   HEMOGLOBIN 11.8*   HEMATOCRIT 36.1*   MCV 95.5   MCH 31.2   MCHC 32.7*   RDW 47.5   PLATELETCT 206   MPV 9.4        Activity:   Discharge to home  Pelvic Rest x 6 weeks    Assessment:  normal postpartum course and good candidate for oral progesterone-only contraceptive     Follow up:  .  TP or [facility] in 5 weeks for post partum follow up; 1 week for incision check.   To resume daily PNV and iron supplement if needed with hydration.     Patient to return to [facility] or ER if any of the following occur:   Fever over 100.5   Severe abdominal pain   Red streaks or painful masses in the breasts   Foul smelling discharge or lochia   Heavy vaginal bleeding saturating a pad per hour   S/s of PP depression     Discharge Meds:      Medication List      START taking these medications      Instructions   docusate sodium 100 MG Caps   Take 100 mg by mouth 2 times a day as needed for Constipation for up to 5 days.  Dose:  100 mg     norethindrone 0.35 MG tablet  Commonly known as:  MICRONOR   Doctor's comments:  Okay to substitute within class per formulary  Take 1 Tab by mouth every day.  Dose:  1 Tab        CONTINUE taking these medications      Instructions   acetaminophen 325 MG Tabs  Commonly known as:  TYLENOL   Take 650 mg by mouth every four hours as needed.  Dose:  650 mg     PRENATAL 1 PO   Take  by mouth.            Jens Salazar M.D.

## 2020-05-18 NOTE — PROGRESS NOTES
No new complaints. VSS. Meet diet, voiding w/out problems. Csection incision drsg cdi. No s/s of infection noted. OOB ad blayne w/ steady gait, ambulating to NICU, utilizing breast pump.

## 2020-05-18 NOTE — DISCHARGE PLANNING
Discharge Planning Assessment Post Partum    Reason for Referral: NICU  Address: [address] [place] [address]  Phone: [phone]  Type of Living Situation: living with mother: Cayla Richardson  Mom Diagnosis: Pregnancy  Baby Diagnosis: NICU-25.6 weeks  Primary Language: English    Name of Baby: Jef Elise (: 20)  Father of the Baby: Not involved  Involved in baby’s care? No  Contact Information: N/A    Prenatal Care: Yes  Mom's PCP: None  PCP for new baby: Pediatrician list provided    Support System: Maternal Grandmother  Coping/Bonding between mother & baby: Yes  Source of Feeding: MOB is pumping  Supplies for Infant: getting supplies for infant    Mom's Insurance: Medicaid FFS  Baby Covered on Insurance: Yes  Mother Employed/School: No  Other children in the home/names & ages: No, 1st baby    Financial Hardship/Income: denies   Mom's Mental status: alert and oriented  Services used prior to admit: Medicaid and WIC    CPS History: No  Psychiatric History: No  Domestic Violence History: No  Drug/ETOH History: No, infant's drug screen is negative    Resources Provided: pediatrician list, children and family resource list, and NICU support packet of resources provided, lodging information from [facility].  MOB is interested in [facility] when they [facility] back up.    Referrals Made: diaper bank referral    Ongoing Plan: SW will continue to follow and assist as needed

## 2020-05-18 NOTE — DISCHARGE INSTRUCTIONS
POSTPARTUM DISCHARGE INSTRUCTIONS FOR MOM    YOB: 2000   Age: 19 y.o.               Admit Date: 2020     Discharge Date: 2020  Attending Doctor:  Marisol Cabrera D.O.                  Allergies:  Patient has no known allergies.    Discharged to home by car. Discharged via wheelchair, hospital escort: Yes.  Special equipment needed: Not Applicable  Belongings with: Personal  Be sure to schedule a follow-up appointment with your primary care doctor or any specialists as instructed.     Discharge Plan:   Diet Plan: Discussed  Activity Level: Discussed  Confirmed Follow up Appointment: Patient to Call and Schedule Appointment  Confirmed Symptoms Management: Discussed  Medication Reconciliation Updated: Yes    REASONS TO CALL YOUR OBSTETRICIAN:  1.   Persistent fever or shaking chills (Temperature higher than 100.4)  2.   Heavy bleeding (soaking more than 1 pad per hour); Passing clots  3.   Foul odor from vagina  4.   Mastitis (Breast infection; breast pain, chills, fever, redness)  5.   Urinary pain, burning or frequency  6.   Episiotomy infection  7.   Abdominal incision infection  8.   Severe depression longer than 24 hours    HAND WASHING  · Prior to handling the baby.  · Before breastfeeding or bottle feeding baby.  · After using the bathroom or changing the baby's diaper.    WOUND CARE  Ask your physician for additional care instructions.  In general:    ·  Incision:      · Keep clean and dry.    · Do NOT lift anything heavier than your baby for up to 6 weeks.    · There should not be any opening or pus.      VAGINAL CARE  · Nothing inside vagina for 6 weeks: no sexual intercourse, tampons or douching.  · Bleeding may continue for 2-4 weeks.  Amount may vary.    · Call your physician for heavy bleeding which means soaking more than 1 pad per hour    BIRTH CONTROL  · It is possible to become pregnant at any time after delivery and while breastfeeding.  · Plan to discuss a method of birth  "control with your physician at your follow up visit. visit.    DIET AND ELIMINATION  · Eating more fiber (bran cereal, fruits, and vegetables) and drinking plenty of fluids will help to avoid constipation.  · Urinary frequency after childbirth is normal.    POSTPARTUM BLUES  During the first few days after birth, you may experience a sense of the \"blues\" which may include impatience, irritability or even crying.  These feeling come and go quickly.  However, as many as 1 in 10 women experience emotional symptoms known as postpartum depression.    Postpartum depression:  May start as early as the second or third day after delivery or take several weeks or months to develop.  Symptoms of \"blues\" are present, but are more intense:  Crying spells; loss of appetite; feelings of hopelessness or loss of control; fear of touching the baby; over concern or no concern at all about the baby; little or no concern about your own appearance/caring for yourself; and/or inability to sleep or excessive sleeping.  Contact your physician if you are experiencing any of these symptoms.    Crisis Hotline:  · [place] Crisis Hotline:  1-708-GTETCAV  Or 1-719.982.7966  · [place] Crisis Hotline:  1-647.450.1790  Or 754-254-4015    PREVENTING SHAKEN BABY:  If you are angry or stressed, PUT THE BABY IN THE CRIB, step away, take some deep breaths, and wait until you are calm to care for the baby.  DO NOT SHAKE THE BABY.  You are not alone, call a supporter for help.    · Crisis Call Center 24/7 crisis line 022-410-1392 or 1-373.603.7575  · You can also text them, text \"ANSWER\" to 610302    QUIT SMOKING/TOBACCO USE:  I understand the use of any tobacco products increases my chance of suffering from future heart disease and could cause other illnesses which may shorten my life. Quitting the use of tobacco products is the single most important thing I can do to improve my health. For further information on smoking / tobacco cessation call a Toll " Free Quit Line at 1-749.899.4717 (*[employer] [place]) or 1-508.307.6290 (American Lung Association) or you can access the web based program at www.lungusa.org.    · [place] Tobacco Users Help Line:  (753) 419-5184       Toll Free: 1-603.794.9695  · Quit Tobacco Program [facility] (214)471-7402    DEPRESSION / SUICIDE RISK:  As you are discharged from this [facility], it is important to learn how to keep safe from harming yourself.    Recognize the warning signs:  · Abrupt changes in personality, positive or negative- including increase in energy   · Giving away possessions  · Change in eating patterns- significant weight changes-  positive or negative  · Change in sleeping patterns- unable to sleep or sleeping all the time   · Unwillingness or inability to communicate  · Depression  · Unusual sadness, discouragement and loneliness  · Talk of wanting to die  · Neglect of personal appearance   · Rebelliousness- reckless behavior  · Withdrawal from people/activities they love  · Confusion- inability to concentrate     If you or a loved one observes any of these behaviors or has concerns about self-harm, here's what you can do:  · Talk about it- your feelings and reasons for harming yourself  · Remove any means that you might use to hurt yourself (examples: pills, rope, extension cords, firearm)  · Get professional help from the community (Mental Health, Substance Abuse, psychological counseling)  · Do not be alone:Call your Safe Contact- someone whom you trust who will be there for you.  · Call your local CRISIS HOTLINE 115-4877 or 281-017-0944  · Call your local Children's Mobile Crisis Response Team [place] (710) 192-6255 or www.National Recovery Services  · Call the toll free National Suicide Prevention Hotlines   · National Suicide Prevention Lifeline 475-087-XFCH (4676)  · National Hope Line Network 800-SUICIDE (969-8760)    DISCHARGE SURVEY:  Thank you for choosing [facility].  We hope we provided you with very good care.  You may be receiving a survey in the mail.  Please fill it out.  Your opinion is valuable to us.    ADDITIONAL EDUCATIONAL MATERIALS GIVEN TO PATIENT: Patient to return to [facility] or ER if any of the following occur: Fever over 100.5, Severe abdominal pain              Red streaks or painful masses in the breasts, Foul smelling discharge or lochia, Heavy vaginal bleeding saturating a pad per hour,  S/s of PP depression           My signature on this form indicates that:  1.  I have reviewed and understand the above information  2.  My questions regarding this information have been answered to my satisfaction.  3.  I have formulated a plan with my discharge nurse to obtain my prescribed medication for home.

## 2020-05-18 NOTE — CARE PLAN
Problem: Altered physiologic condition related to postoperative  delivery  Goal: Patient physiologically stable as evidenced by normal lochia, palpable uterine involution and vital signs within normal limits  Outcome: PROGRESSING AS EXPECTED  Note: Fundus is firm, lochia scant. Vital signs remain normal.     Problem: Alteration in comfort related to surgical incision and/or after birth pains  Goal: Patient is able to ambulate, care for self and infant with acceptable pain level  Outcome: PROGRESSING AS EXPECTED  Note: Patient is ambulating and performing all ADLs independently. She makes needs known appropriately and reports good pain control with scheduled medications. She is using a wheelchair to make trips to NICU as tolerated.

## 2020-05-18 NOTE — PROGRESS NOTES
Assumed care. Assessment completed, fundus firm, lochia light. ABD incision with Mepilex Ag dressing intact. POC reviewed, verbalized understanding. Patient is aware she will be discharged tomorrow. She continues to use the breast pump and visit infant in NICU as tolerated. Denies pain at this time, will call if pain med intervention needed.     Patient reports that FOB is not involved and is requesting baby band for her mother, who is her support person and was present at delivery. Per NICU only banded parents are allowed to visit NICU at this time.

## 2020-05-29 ENCOUNTER — POST PARTUM (OUTPATIENT)
Dept: OBGYN | Facility: CLINIC | Age: 20
End: 2020-05-29
Payer: MEDICAID

## 2020-05-29 VITALS — WEIGHT: 207.9 LBS | DIASTOLIC BLOOD PRESSURE: 70 MMHG | SYSTOLIC BLOOD PRESSURE: 120 MMHG | BODY MASS INDEX: 34.6 KG/M2

## 2020-05-29 DIAGNOSIS — Z09 POSTOP CHECK: ICD-10-CM

## 2020-05-29 PROCEDURE — 99024 POSTOP FOLLOW-UP VISIT: CPT | Performed by: OBSTETRICS & GYNECOLOGY

## 2020-05-29 RX ORDER — ACETAMINOPHEN AND CODEINE PHOSPHATE 120; 12 MG/5ML; MG/5ML
1 SOLUTION ORAL DAILY
Qty: 28 TAB | Refills: 11 | Status: SHIPPED | OUTPATIENT
Start: 2020-05-29 | End: 2020-10-06

## 2020-05-29 NOTE — PROGRESS NOTES
Patient here for C Section check.  C Section done on  05/14/2020  Patient is Pt is pumping   Vaginal bleeding is Very light pt states   PP visit Is going to be scheduled after this one  Phone number: [phone]  Pharmacy verified  C/o   Pt states she gets sharp pains on right side of incision

## 2020-05-29 NOTE — PROGRESS NOTES
C/c: INCISION CHECK    HPI: Patient a  postop from  section on 2020 for  labor at 25 weeks next days with advanced cervical dilation and cord presentation noted on ultrasound.  Found to be converted to footling breech intraoperatively. Baby still in the NICU but doing better.  Desires Micronor for contraception.   Has resumed regular diet.   Breast/bottle feeding  Lochia: very light    Pregnancy complicated by:   Patient Active Problem List   Diagnosis   • Supervision of normal first pregnancy, antepartum   • LGSIL on Pap smear of cervix - f/u PAP 1 yr   • Rh negative status during pregnancy       Review of systems:  Gen: denies fever or chills  Breast: denies breast pain or tenderness  GI: denies constipation, passing gas, no diarrhea  : denies dysuria or excessive vaginal bleeding  Ext: nontender bl, no edema, no lesions    O:  Gen: AAO in NAD  Abd: nontender, non distended, no rebound or guarding, incision clean and dry with intact sutures.   Extremities: nonedematous bilaterally  Psych: Normal mood, appriopriate affect    AP: 19 y.o.  at postop day 14 from primary low transverse  section.   - postpartum prophylaxis: Micronor sent to the pharmacy; advised pt if insurance does not pay for this would rx depo provera instead.   - PNV while breast feeding  - continue pelvic rest  - RTC in 4 weeks for postpartum exam

## 2020-08-20 ENCOUNTER — APPOINTMENT (OUTPATIENT)
Dept: RADIOLOGY | Facility: MEDICAL CENTER | Age: 20
End: 2020-08-20
Attending: EMERGENCY MEDICINE
Payer: MEDICAID

## 2020-08-20 ENCOUNTER — HOSPITAL ENCOUNTER (EMERGENCY)
Facility: MEDICAL CENTER | Age: 20
End: 2020-08-20
Attending: EMERGENCY MEDICINE
Payer: MEDICAID

## 2020-08-20 VITALS
WEIGHT: 216.93 LBS | TEMPERATURE: 100.4 F | RESPIRATION RATE: 16 BRPM | DIASTOLIC BLOOD PRESSURE: 65 MMHG | OXYGEN SATURATION: 98 % | HEIGHT: 70 IN | SYSTOLIC BLOOD PRESSURE: 126 MMHG | HEART RATE: 78 BPM | BODY MASS INDEX: 31.06 KG/M2

## 2020-08-20 DIAGNOSIS — R11.2 NON-INTRACTABLE VOMITING WITH NAUSEA, UNSPECIFIED VOMITING TYPE: ICD-10-CM

## 2020-08-20 DIAGNOSIS — R50.9 FEVER, UNSPECIFIED FEVER CAUSE: ICD-10-CM

## 2020-08-20 DIAGNOSIS — R06.00 DYSPNEA, UNSPECIFIED TYPE: ICD-10-CM

## 2020-08-20 DIAGNOSIS — R10.9 ACUTE LEFT FLANK PAIN: ICD-10-CM

## 2020-08-20 DIAGNOSIS — N39.0 ACUTE UTI: ICD-10-CM

## 2020-08-20 LAB
ALBUMIN SERPL BCP-MCNC: 4.3 G/DL (ref 3.2–4.9)
ALBUMIN/GLOB SERPL: 1 G/DL
ALP SERPL-CCNC: 105 U/L (ref 30–99)
ALT SERPL-CCNC: 14 U/L (ref 2–50)
ANION GAP SERPL CALC-SCNC: 23 MMOL/L (ref 7–16)
APPEARANCE UR: CLEAR
AST SERPL-CCNC: 13 U/L (ref 12–45)
BACTERIA #/AREA URNS HPF: ABNORMAL /HPF
BASOPHILS # BLD AUTO: 0.3 % (ref 0–1.8)
BASOPHILS # BLD: 0.05 K/UL (ref 0–0.12)
BILIRUB SERPL-MCNC: 0.7 MG/DL (ref 0.1–1.5)
BILIRUB UR QL STRIP.AUTO: NEGATIVE
BUN SERPL-MCNC: 10 MG/DL (ref 8–22)
CALCIUM SERPL-MCNC: 9.9 MG/DL (ref 8.5–10.5)
CHLORIDE SERPL-SCNC: 91 MMOL/L (ref 96–112)
CO2 SERPL-SCNC: 20 MMOL/L (ref 20–33)
COLOR UR: YELLOW
CREAT SERPL-MCNC: 0.88 MG/DL (ref 0.5–1.4)
EOSINOPHIL # BLD AUTO: 0.02 K/UL (ref 0–0.51)
EOSINOPHIL NFR BLD: 0.1 % (ref 0–6.9)
EPI CELLS #/AREA URNS HPF: NEGATIVE /HPF
ERYTHROCYTE [DISTWIDTH] IN BLOOD BY AUTOMATED COUNT: 37.7 FL (ref 35.9–50)
GLOBULIN SER CALC-MCNC: 4.1 G/DL (ref 1.9–3.5)
GLUCOSE SERPL-MCNC: 97 MG/DL (ref 65–99)
GLUCOSE UR STRIP.AUTO-MCNC: NEGATIVE MG/DL
HCG SERPL QL: NEGATIVE
HCT VFR BLD AUTO: 36.1 % (ref 37–47)
HGB BLD-MCNC: 12.5 G/DL (ref 12–16)
HYALINE CASTS #/AREA URNS LPF: ABNORMAL /LPF
IMM GRANULOCYTES # BLD AUTO: 0.17 K/UL (ref 0–0.11)
IMM GRANULOCYTES NFR BLD AUTO: 1.2 % (ref 0–0.9)
KETONES UR STRIP.AUTO-MCNC: 80 MG/DL
LACTATE BLD-SCNC: 1.3 MMOL/L (ref 0.5–2)
LEUKOCYTE ESTERASE UR QL STRIP.AUTO: ABNORMAL
LIPASE SERPL-CCNC: 99 U/L (ref 11–82)
LYMPHOCYTES # BLD AUTO: 1.34 K/UL (ref 1–4.8)
LYMPHOCYTES NFR BLD: 9.3 % (ref 22–41)
MCH RBC QN AUTO: 27.4 PG (ref 27–33)
MCHC RBC AUTO-ENTMCNC: 34.6 G/DL (ref 33.6–35)
MCV RBC AUTO: 79 FL (ref 81.4–97.8)
MICRO URNS: ABNORMAL
MONOCYTES # BLD AUTO: 1.05 K/UL (ref 0–0.85)
MONOCYTES NFR BLD AUTO: 7.3 % (ref 0–13.4)
NEUTROPHILS # BLD AUTO: 11.73 K/UL (ref 2–7.15)
NEUTROPHILS NFR BLD: 81.8 % (ref 44–72)
NITRITE UR QL STRIP.AUTO: NEGATIVE
NRBC # BLD AUTO: 0 K/UL
NRBC BLD-RTO: 0 /100 WBC
PH UR STRIP.AUTO: 6.5 [PH] (ref 5–8)
PLATELET # BLD AUTO: 257 K/UL (ref 164–446)
PMV BLD AUTO: 10.6 FL (ref 9–12.9)
POTASSIUM SERPL-SCNC: 2.9 MMOL/L (ref 3.6–5.5)
PROCALCITONIN SERPL-MCNC: 0.89 NG/ML
PROT SERPL-MCNC: 8.4 G/DL (ref 6–8.2)
PROT UR QL STRIP: 100 MG/DL
RBC # BLD AUTO: 4.57 M/UL (ref 4.2–5.4)
RBC # URNS HPF: ABNORMAL /HPF
RBC UR QL AUTO: ABNORMAL
SODIUM SERPL-SCNC: 134 MMOL/L (ref 135–145)
SP GR UR STRIP.AUTO: 1.01
TROPONIN T SERPL-MCNC: <6 NG/L (ref 6–19)
UROBILINOGEN UR STRIP.AUTO-MCNC: 1 MG/DL
WBC # BLD AUTO: 14.4 K/UL (ref 4.8–10.8)
WBC #/AREA URNS HPF: ABNORMAL /HPF

## 2020-08-20 PROCEDURE — 99285 EMERGENCY DEPT VISIT HI MDM: CPT

## 2020-08-20 PROCEDURE — 36415 COLL VENOUS BLD VENIPUNCTURE: CPT

## 2020-08-20 PROCEDURE — 81001 URINALYSIS AUTO W/SCOPE: CPT

## 2020-08-20 PROCEDURE — 700102 HCHG RX REV CODE 250 W/ 637 OVERRIDE(OP): Performed by: EMERGENCY MEDICINE

## 2020-08-20 PROCEDURE — 700105 HCHG RX REV CODE 258: Performed by: EMERGENCY MEDICINE

## 2020-08-20 PROCEDURE — 84484 ASSAY OF TROPONIN QUANT: CPT

## 2020-08-20 PROCEDURE — 96365 THER/PROPH/DIAG IV INF INIT: CPT

## 2020-08-20 PROCEDURE — 85025 COMPLETE CBC W/AUTO DIFF WBC: CPT

## 2020-08-20 PROCEDURE — 87040 BLOOD CULTURE FOR BACTERIA: CPT

## 2020-08-20 PROCEDURE — 84703 CHORIONIC GONADOTROPIN ASSAY: CPT

## 2020-08-20 PROCEDURE — 71045 X-RAY EXAM CHEST 1 VIEW: CPT

## 2020-08-20 PROCEDURE — 87186 SC STD MICRODIL/AGAR DIL: CPT | Mod: 91

## 2020-08-20 PROCEDURE — 83690 ASSAY OF LIPASE: CPT

## 2020-08-20 PROCEDURE — 83605 ASSAY OF LACTIC ACID: CPT

## 2020-08-20 PROCEDURE — 93005 ELECTROCARDIOGRAM TRACING: CPT | Performed by: EMERGENCY MEDICINE

## 2020-08-20 PROCEDURE — 80053 COMPREHEN METABOLIC PANEL: CPT

## 2020-08-20 PROCEDURE — 700111 HCHG RX REV CODE 636 W/ 250 OVERRIDE (IP): Performed by: EMERGENCY MEDICINE

## 2020-08-20 PROCEDURE — 87077 CULTURE AEROBIC IDENTIFY: CPT

## 2020-08-20 PROCEDURE — 96375 TX/PRO/DX INJ NEW DRUG ADDON: CPT

## 2020-08-20 PROCEDURE — A9270 NON-COVERED ITEM OR SERVICE: HCPCS | Performed by: EMERGENCY MEDICINE

## 2020-08-20 PROCEDURE — 84145 PROCALCITONIN (PCT): CPT

## 2020-08-20 RX ORDER — KETOROLAC TROMETHAMINE 30 MG/ML
15 INJECTION, SOLUTION INTRAMUSCULAR; INTRAVENOUS ONCE
Status: COMPLETED | OUTPATIENT
Start: 2020-08-20 | End: 2020-08-20

## 2020-08-20 RX ORDER — CEFDINIR 300 MG/1
300 CAPSULE ORAL 2 TIMES DAILY
Qty: 20 CAP | Refills: 0 | Status: SHIPPED | OUTPATIENT
Start: 2020-08-20 | End: 2020-08-30

## 2020-08-20 RX ORDER — ACETAMINOPHEN 325 MG/1
650 TABLET ORAL ONCE
Status: COMPLETED | OUTPATIENT
Start: 2020-08-20 | End: 2020-08-20

## 2020-08-20 RX ORDER — ONDANSETRON 2 MG/ML
4 INJECTION INTRAMUSCULAR; INTRAVENOUS ONCE
Status: COMPLETED | OUTPATIENT
Start: 2020-08-20 | End: 2020-08-20

## 2020-08-20 RX ORDER — POTASSIUM CHLORIDE 20 MEQ/1
40 TABLET, EXTENDED RELEASE ORAL ONCE
Status: COMPLETED | OUTPATIENT
Start: 2020-08-20 | End: 2020-08-20

## 2020-08-20 RX ORDER — ONDANSETRON 4 MG/1
4 TABLET, ORALLY DISINTEGRATING ORAL EVERY 6 HOURS PRN
Qty: 5 TAB | Refills: 0 | Status: SHIPPED | OUTPATIENT
Start: 2020-08-20 | End: 2020-10-06

## 2020-08-20 RX ORDER — SODIUM CHLORIDE, SODIUM LACTATE, POTASSIUM CHLORIDE, CALCIUM CHLORIDE 600; 310; 30; 20 MG/100ML; MG/100ML; MG/100ML; MG/100ML
1000 INJECTION, SOLUTION INTRAVENOUS ONCE
Status: COMPLETED | OUTPATIENT
Start: 2020-08-20 | End: 2020-08-20

## 2020-08-20 RX ADMIN — ONDANSETRON 4 MG: 2 INJECTION INTRAMUSCULAR; INTRAVENOUS at 20:37

## 2020-08-20 RX ADMIN — CEFTRIAXONE SODIUM 2 G: 2 INJECTION, POWDER, FOR SOLUTION INTRAMUSCULAR; INTRAVENOUS at 21:06

## 2020-08-20 RX ADMIN — ACETAMINOPHEN 650 MG: 325 TABLET, FILM COATED ORAL at 20:37

## 2020-08-20 RX ADMIN — SODIUM CHLORIDE, POTASSIUM CHLORIDE, SODIUM LACTATE AND CALCIUM CHLORIDE 1000 ML: 600; 310; 30; 20 INJECTION, SOLUTION INTRAVENOUS at 20:40

## 2020-08-20 RX ADMIN — KETOROLAC TROMETHAMINE 15 MG: 30 INJECTION, SOLUTION INTRAMUSCULAR at 20:37

## 2020-08-20 RX ADMIN — POTASSIUM CHLORIDE 40 MEQ: 1500 TABLET, EXTENDED RELEASE ORAL at 21:05

## 2020-08-21 ENCOUNTER — APPOINTMENT (OUTPATIENT)
Dept: RADIOLOGY | Facility: MEDICAL CENTER | Age: 20
End: 2020-08-21
Attending: EMERGENCY MEDICINE
Payer: MEDICAID

## 2020-08-21 ENCOUNTER — HOSPITAL ENCOUNTER (EMERGENCY)
Facility: MEDICAL CENTER | Age: 20
End: 2020-08-21
Attending: EMERGENCY MEDICINE
Payer: MEDICAID

## 2020-08-21 VITALS
RESPIRATION RATE: 10 BRPM | OXYGEN SATURATION: 92 % | SYSTOLIC BLOOD PRESSURE: 134 MMHG | HEIGHT: 65 IN | BODY MASS INDEX: 35.96 KG/M2 | DIASTOLIC BLOOD PRESSURE: 82 MMHG | TEMPERATURE: 97 F | WEIGHT: 215.83 LBS | HEART RATE: 94 BPM

## 2020-08-21 DIAGNOSIS — N12 PYELONEPHRITIS: ICD-10-CM

## 2020-08-21 LAB
ALBUMIN SERPL BCP-MCNC: 3.5 G/DL (ref 3.2–4.9)
ALBUMIN/GLOB SERPL: 1 G/DL
ALP SERPL-CCNC: 83 U/L (ref 30–99)
ALT SERPL-CCNC: 13 U/L (ref 2–50)
ANION GAP SERPL CALC-SCNC: 14 MMOL/L (ref 7–16)
AST SERPL-CCNC: 10 U/L (ref 12–45)
BASOPHILS # BLD AUTO: 0.5 % (ref 0–1.8)
BASOPHILS # BLD: 0.06 K/UL (ref 0–0.12)
BILIRUB SERPL-MCNC: 0.3 MG/DL (ref 0.1–1.5)
BUN SERPL-MCNC: 12 MG/DL (ref 8–22)
CALCIUM SERPL-MCNC: 9.1 MG/DL (ref 8.5–10.5)
CHLORIDE SERPL-SCNC: 98 MMOL/L (ref 96–112)
CO2 SERPL-SCNC: 25 MMOL/L (ref 20–33)
CREAT SERPL-MCNC: 0.82 MG/DL (ref 0.5–1.4)
EKG IMPRESSION: NORMAL
EOSINOPHIL # BLD AUTO: 0.08 K/UL (ref 0–0.51)
EOSINOPHIL NFR BLD: 0.7 % (ref 0–6.9)
ERYTHROCYTE [DISTWIDTH] IN BLOOD BY AUTOMATED COUNT: 40 FL (ref 35.9–50)
GLOBULIN SER CALC-MCNC: 3.6 G/DL (ref 1.9–3.5)
GLUCOSE SERPL-MCNC: 100 MG/DL (ref 65–99)
HCT VFR BLD AUTO: 32.6 % (ref 37–47)
HGB BLD-MCNC: 10.8 G/DL (ref 12–16)
IMM GRANULOCYTES # BLD AUTO: 0.14 K/UL (ref 0–0.11)
IMM GRANULOCYTES NFR BLD AUTO: 1.2 % (ref 0–0.9)
LYMPHOCYTES # BLD AUTO: 1.39 K/UL (ref 1–4.8)
LYMPHOCYTES NFR BLD: 12.1 % (ref 22–41)
MCH RBC QN AUTO: 27.3 PG (ref 27–33)
MCHC RBC AUTO-ENTMCNC: 33.1 G/DL (ref 33.6–35)
MCV RBC AUTO: 82.5 FL (ref 81.4–97.8)
MONOCYTES # BLD AUTO: 0.83 K/UL (ref 0–0.85)
MONOCYTES NFR BLD AUTO: 7.2 % (ref 0–13.4)
NEUTROPHILS # BLD AUTO: 8.99 K/UL (ref 2–7.15)
NEUTROPHILS NFR BLD: 78.3 % (ref 44–72)
NRBC # BLD AUTO: 0 K/UL
NRBC BLD-RTO: 0 /100 WBC
PLATELET # BLD AUTO: 215 K/UL (ref 164–446)
PMV BLD AUTO: 10.3 FL (ref 9–12.9)
POTASSIUM SERPL-SCNC: 3.3 MMOL/L (ref 3.6–5.5)
PROT SERPL-MCNC: 7.1 G/DL (ref 6–8.2)
RBC # BLD AUTO: 3.95 M/UL (ref 4.2–5.4)
SODIUM SERPL-SCNC: 137 MMOL/L (ref 135–145)
WBC # BLD AUTO: 11.5 K/UL (ref 4.8–10.8)

## 2020-08-21 PROCEDURE — 700102 HCHG RX REV CODE 250 W/ 637 OVERRIDE(OP): Performed by: EMERGENCY MEDICINE

## 2020-08-21 PROCEDURE — 96365 THER/PROPH/DIAG IV INF INIT: CPT

## 2020-08-21 PROCEDURE — 80053 COMPREHEN METABOLIC PANEL: CPT

## 2020-08-21 PROCEDURE — 85025 COMPLETE CBC W/AUTO DIFF WBC: CPT

## 2020-08-21 PROCEDURE — 700111 HCHG RX REV CODE 636 W/ 250 OVERRIDE (IP): Performed by: EMERGENCY MEDICINE

## 2020-08-21 PROCEDURE — A9270 NON-COVERED ITEM OR SERVICE: HCPCS | Performed by: EMERGENCY MEDICINE

## 2020-08-21 PROCEDURE — 96375 TX/PRO/DX INJ NEW DRUG ADDON: CPT

## 2020-08-21 PROCEDURE — 700105 HCHG RX REV CODE 258: Performed by: EMERGENCY MEDICINE

## 2020-08-21 PROCEDURE — 76775 US EXAM ABDO BACK WALL LIM: CPT

## 2020-08-21 PROCEDURE — 99284 EMERGENCY DEPT VISIT MOD MDM: CPT

## 2020-08-21 RX ORDER — ONDANSETRON 2 MG/ML
4 INJECTION INTRAMUSCULAR; INTRAVENOUS ONCE
Status: COMPLETED | OUTPATIENT
Start: 2020-08-21 | End: 2020-08-21

## 2020-08-21 RX ORDER — SODIUM CHLORIDE, SODIUM LACTATE, POTASSIUM CHLORIDE, CALCIUM CHLORIDE 600; 310; 30; 20 MG/100ML; MG/100ML; MG/100ML; MG/100ML
1000 INJECTION, SOLUTION INTRAVENOUS ONCE
Status: COMPLETED | OUTPATIENT
Start: 2020-08-21 | End: 2020-08-21

## 2020-08-21 RX ORDER — ONDANSETRON 4 MG/1
4 TABLET, ORALLY DISINTEGRATING ORAL EVERY 8 HOURS PRN
Qty: 10 TAB | Refills: 0 | Status: SHIPPED | OUTPATIENT
Start: 2020-08-21 | End: 2020-10-06

## 2020-08-21 RX ORDER — POTASSIUM CHLORIDE 20 MEQ/1
20 TABLET, EXTENDED RELEASE ORAL ONCE
Status: COMPLETED | OUTPATIENT
Start: 2020-08-21 | End: 2020-08-21

## 2020-08-21 RX ADMIN — CEFTRIAXONE SODIUM 2 G: 2 INJECTION, POWDER, FOR SOLUTION INTRAMUSCULAR; INTRAVENOUS at 14:20

## 2020-08-21 RX ADMIN — POTASSIUM CHLORIDE 20 MEQ: 20 TABLET, EXTENDED RELEASE ORAL at 14:20

## 2020-08-21 RX ADMIN — SODIUM CHLORIDE, POTASSIUM CHLORIDE, SODIUM LACTATE AND CALCIUM CHLORIDE 1000 ML: 600; 310; 30; 20 INJECTION, SOLUTION INTRAVENOUS at 14:20

## 2020-08-21 RX ADMIN — ONDANSETRON 4 MG: 2 INJECTION INTRAMUSCULAR; INTRAVENOUS at 14:20

## 2020-08-21 ASSESSMENT — FIBROSIS 4 INDEX: FIB4 SCORE: 0.3

## 2020-08-21 NOTE — ED TRIAGE NOTES
Chief Complaint   Patient presents with   • N/V   • Other     seen in ED yesterday, called by [facility] to return to ED for lab recheck and additional medication - +blood cultures reported.     Patient to triage via ambulation, with a steady gait, patient A&O x4.      Explained wait time and triage process to pt. Pt placed back in lobby, told to notify ED tech or triage RN of any changes, verbalized understanding.

## 2020-08-21 NOTE — ED PROVIDER NOTES
ED Provider Note    CHIEF COMPLAINT  Chief Complaint   Patient presents with   • N/V     X4 days.   • Shortness of Breath     with exertion   • Low Back Pain     X4 days        HPI    Primary care provider: Pcp Pt States None   History obtained from: Patient and mother  History limited by: None     Divine Elise is a 20 y.o. female who presents to the ED with mother complaining of left-sided back/flank pain for the past 4 to 5 days along with more than 100 episodes of nausea and vomiting mostly of clear fluid and sometimes with yellow acid.  She also reports having shortness of breath with exertion.  She has been feeling feverish along with chills but did not check her temperature at home.  She denies congestion/sore throat/runny nose/cough.  She denies chest pain or abdominal pain.  She denies diarrhea/constipation/dysuria.  She denies possibility of pregnancy.  She denies recent travels or known ill contacts.    REVIEW OF SYSTEMS  Please see HPI for pertinent positives/negatives.  All other systems reviewed and are negative.     PAST MEDICAL HISTORY  Past Medical History:   Diagnosis Date   • Rh negative status during pregnancy 2020        SURGICAL HISTORY  Past Surgical History:   Procedure Laterality Date   • PB  DELIVERY ONLY N/A 2020    Procedure:  SECTION, PRIMARY;  Surgeon: Marisol Cabrera D.O.;  Location: LABOR AND DELIVERY;  Service: Obstetrics   • DENTAL EXTRACTION(S)     • TONSILLECTOMY          SOCIAL HISTORY  Social History     Tobacco Use   • Smoking status: Never Smoker   • Smokeless tobacco: Never Used   Substance and Sexual Activity   • Alcohol use: Never     Frequency: Never   • Drug use: Yes     Types: Marijuana, Inhaled     Comment: MJ   • Sexual activity: Yes     Partners: Male     Comment: Unplanned pregnancy.         FAMILY HISTORY  Family History   Problem Relation Age of Onset   • Autism Brother    • Seizures Brother    • Cancer Maternal Aunt         Breast   •  "Diabetes Maternal Grandfather         CURRENT MEDICATIONS  Home Medications     Reviewed by Radha Mcgraw R.N. (Registered Nurse) on 08/20/20 at 1858  Med List Status: Partial   Medication Last Dose Status   acetaminophen (TYLENOL) 325 MG Tab  Active   norethindrone (MICRONOR) 0.35 MG tablet  Active   norethindrone (MICRONOR) 0.35 MG tablet  Active   Prenatal MV-Min-Fe Fum-FA-DHA (PRENATAL 1 PO)  Active                 ALLERGIES  No Known Allergies     PHYSICAL EXAM  VITAL SIGNS: /65   Pulse 78   Temp 38 °C (100.4 °F) (Oral)   Resp 16   Ht 1.778 m (5' 10\")   Wt 98.4 kg (216 lb 14.9 oz)   SpO2 98%   BMI 31.13 kg/m²  @HARJINDER[[id number]::@     Pulse ox interpretation: 97% I interpret this pulse ox as normal     Cardiac monitor interpretation: Sinus tachycardia with heart rate in the 100s as interpreted by me.  The patient presented with dyspnea and tachycardia and cardiac monitor was ordered to monitor for dysrhythmia.    Constitutional: Well developed, well nourished, alert in no apparent distress, nontoxic appearance    HENT: No external signs of trauma, normocephalic  Eyes: PERRL, conjunctiva without erythema, no discharge, no icterus    Neck: Soft and supple, trachea midline, no stridor, no tenderness, no LAD, no JVD, good ROM    Cardiovascular: Tachycardia, no murmurs/rubs/gallops, strong distal pulses and good perfusion    Thorax & Lungs: No respiratory distress, CTAB   Abdomen: Soft, nontender, nondistended, no guarding, no rebound, normal BS    Back: Left CVAT    Extremities: No cyanosis, no edema, no gross deformity, good ROM, no tenderness, intact distal pulses with brisk cap refill    Skin: Warm, dry, no pallor/cyanosis, no rash noted    Lymphatic: No lymphadenopathy noted    Neuro: A/O times 3, no focal deficits noted    Psychiatric: Cooperative, normal mood and affect, normal judgement, appropriate for clinical situation        DIAGNOSTIC STUDIES / PROCEDURES    EKG  12 Lead EKG obtained at 2127 and " interpreted by me:   Rate: 92   Rhythm: Sinus rhythm   Ectopy: None  Intervals: Normal   Axis: Normal   QRS: Normal   ST segments: Normal  T Waves: Normal    Clinical Impression: Sinus rhythm without acute ischemic changes or dysrhythmia       LABS  All labs reviewed by me.     Results for orders placed or performed during the hospital encounter of 08/20/20   CBC WITH DIFFERENTIAL   Result Value Ref Range    WBC 14.4 (H) 4.8 - 10.8 K/uL    RBC 4.57 4.20 - 5.40 M/uL    Hemoglobin 12.5 12.0 - 16.0 g/dL    Hematocrit 36.1 (L) 37.0 - 47.0 %    MCV 79.0 (L) 81.4 - 97.8 fL    MCH 27.4 27.0 - 33.0 pg    MCHC 34.6 33.6 - 35.0 g/dL    RDW 37.7 35.9 - 50.0 fL    Platelet Count 257 164 - 446 K/uL    MPV 10.6 9.0 - 12.9 fL    Neutrophils-Polys 81.80 (H) 44.00 - 72.00 %    Lymphocytes 9.30 (L) 22.00 - 41.00 %    Monocytes 7.30 0.00 - 13.40 %    Eosinophils 0.10 0.00 - 6.90 %    Basophils 0.30 0.00 - 1.80 %    Immature Granulocytes 1.20 (H) 0.00 - 0.90 %    Nucleated RBC 0.00 /100 WBC    Neutrophils (Absolute) 11.73 (H) 2.00 - 7.15 K/uL    Lymphs (Absolute) 1.34 1.00 - 4.80 K/uL    Monos (Absolute) 1.05 (H) 0.00 - 0.85 K/uL    Eos (Absolute) 0.02 0.00 - 0.51 K/uL    Baso (Absolute) 0.05 0.00 - 0.12 K/uL    Immature Granulocytes (abs) 0.17 (H) 0.00 - 0.11 K/uL    NRBC (Absolute) 0.00 K/uL   COMP METABOLIC PANEL   Result Value Ref Range    Sodium 134 (L) 135 - 145 mmol/L    Potassium 2.9 (L) 3.6 - 5.5 mmol/L    Chloride 91 (L) 96 - 112 mmol/L    Co2 20 20 - 33 mmol/L    Anion Gap 23.0 (H) 7.0 - 16.0    Glucose 97 65 - 99 mg/dL    Bun 10 8 - 22 mg/dL    Creatinine 0.88 0.50 - 1.40 mg/dL    Calcium 9.9 8.5 - 10.5 mg/dL    AST(SGOT) 13 12 - 45 U/L    ALT(SGPT) 14 2 - 50 U/L    Alkaline Phosphatase 105 (H) 30 - 99 U/L    Total Bilirubin 0.7 0.1 - 1.5 mg/dL    Albumin 4.3 3.2 - 4.9 g/dL    Total Protein 8.4 (H) 6.0 - 8.2 g/dL    Globulin 4.1 (H) 1.9 - 3.5 g/dL    A-G Ratio 1.0 g/dL   LIPASE   Result Value Ref Range    Lipase 99 (H) 11 -  82 U/L   HCG QUAL SERUM   Result Value Ref Range    Beta-Hcg Qualitative Serum Negative Negative   URINALYSIS,CULTURE IF INDICATED    Specimen: Urine, Cath; Blood   Result Value Ref Range    Color Yellow     Character Clear     Specific Gravity 1.011 <1.035    Ph 6.5 5.0 - 8.0    Glucose Negative Negative mg/dL    Ketones 80 (A) Negative mg/dL    Protein 100 (A) Negative mg/dL    Bilirubin Negative Negative    Urobilinogen, Urine 1.0 Negative    Nitrite Negative Negative    Leukocyte Esterase Moderate (A) Negative    Occult Blood Moderate (A) Negative    Micro Urine Req Microscopic    URINE MICROSCOPIC (W/UA)   Result Value Ref Range    WBC 20-50 (A) /hpf    RBC  (A) /hpf    Bacteria Moderate (A) None /hpf    Epithelial Cells Negative /hpf    Hyaline Cast 0-2 /lpf   LACTIC ACID   Result Value Ref Range    Lactic Acid 1.3 0.5 - 2.0 mmol/L   ESTIMATED GFR   Result Value Ref Range    GFR If African American >60 >60 mL/min/1.73 m 2    GFR If Non African American >60 >60 mL/min/1.73 m 2   PROCALCITONIN   Result Value Ref Range    Procalcitonin 0.89 (H) <0.25 ng/mL   TROPONIN   Result Value Ref Range    Troponin T <6 6 - 19 ng/L   EKG (NOW)   Result Value Ref Range    Report       [facility] Emergency Dept.    Test Date:  2020  Pt Name:    CALISTA CARO                Department: ER  MRN:        [id number]                      Room:       [facility]  Gender:     Female                       Technician: [id number]  :        [id number]                   Requested By:RAY WHEAT  Order #:    [id number]                    Reading MD:    Measurements  Intervals                                Axis  Rate:       92                           P:          11  NJ:         136                          QRS:        38  QRSD:       94                           T:          21  QT:         364  QTc:        451    Interpretive Statements  SINUS RHYTHM  BASELINE WANDER IN LEAD(S) II,III,aVF,V3,V5  No previous ECG  available for comparison          RADIOLOGY  The radiologist's interpretation of all radiological studies have been reviewed by me.     DX-CHEST-PORTABLE (1 VIEW)   Final Result      No acute cardiopulmonary abnormality.             COURSE & MEDICAL DECISION MAKING  Nursing notes, VS, PMSFHx reviewed in chart.     Review of past medical records shows the patient without past visits to this ED.      Differential diagnoses considered include but are not limited to: AMI, pericardial effusion/tamponade, pericarditis, CHF/pulm edema, PE, pleural effusion, respiratory infection, DKA, sepsis, metabolic acidosis, AAA, bowel perforation/obstruction, ileus, pancreatitis, gastritis, KS/renal colic, UTI/pyelo, colitis, constipation, muscle strain, pregnancy/ectopic      History and physical exam as above.  EKG without evidence for acute ischemic changes or dysrhythmia.  Chest x-ray without evidence for acute abnormality.  Troponin was negative and patient with low risk factors for ACS.  Low clinical suspicion for PE as well and patient without significant risk factors.  Lab abnormalities include possible UTI and along with her left flank pain and fever, this is likely pyelonephritis.  She has slightly elevated procalcitonin but normal lactic acid.  She was started on Rocephin after blood cultures.  Patient received acetaminophen and Toradol for pain.  She received oral potassium replacement for her mild hypokalemia.  She was given Zofran for nausea and subsequently tolerated oral fluids without difficulty.  I discussed the findings with the patient and mother.  Patient report that she is feeling much better.  She is noted to be in no acute distress and nontoxic in appearance.  Using shared decision making, they declined admission and would like to try outpatient treatment with antibiotic.  Patient will be discharged with Omnicef and Zofran.  She can use acetaminophen/ibuprofen as needed for fever and pain.  She was advised on  hydration and good hygiene.  I discussed with them worrisome signs and symptoms and return to ED precautions as well as need for outpatient follow-up.  They verbalized understanding and agreed with plan of care with no further questions or concerns.      HYDRATION: Based on the patient's presentation of Acute Vomiting and Tachycardia the patient was given IV fluids. IV Hydration was used because oral hydration was not as rapid as required. Upon recheck following hydration, the patient was improved.      The patient is referred to a primary physician for blood pressure management, diabetic screening, and for all other preventative health concerns.       FINAL IMPRESSION  1. Acute left flank pain Acute   2. Acute UTI Active   3. Fever, unspecified fever cause Active   4. Non-intractable vomiting with nausea, unspecified vomiting type Acute   5. Dyspnea, unspecified type Acute          DISPOSITION  Patient will be discharged home in stable condition.       FOLLOW UP  Please follow-up with your doctor    Call in 1 day      [facility], Emergency Dept  [address] [facility] [id number] [phone]    If symptoms worsen         OUTPATIENT MEDICATIONS  Discharge Medication List as of 8/20/2020 10:44 PM      START taking these medications    Details   cefdinir (OMNICEF) 300 MG Cap Take 1 Cap by mouth 2 times a day for 10 days., Disp-20 Cap,R-0, Print Rx Paper      ondansetron (ZOFRAN ODT) 4 MG TABLET DISPERSIBLE Take 1 Tab by mouth every 6 hours as needed., Disp-5 Tab,R-0, Print Rx Paper                Electronically signed by: Blanco Mauricio D.O., 8/20/2020 8:06 PM      Portions of this record were made with voice recognition software.  Despite my review, spelling/grammar/context errors may still remain.  Interpretation of this chart should be taken in this context.

## 2020-08-21 NOTE — ED NOTES
Discharged per pedis with mom.  She verbalizes understanding of her DC instructions and prescriptions.  Ambulates with a steady gait.

## 2020-08-21 NOTE — ED TRIAGE NOTES
Divine Elise  20 y.o.  Chief Complaint   Patient presents with   • N/V     X4 days.   • Shortness of Breath     with exertion   • Low Back Pain     X4 days       Patient to triage with above complaint.  Pt reports above symptoms for past couple of days and unable to eat or drink with vomiting.  Pt emesis X1 during triage.        Vitals:    08/20/20 1836   BP: 154/99   Pulse: (!) 114   Resp: 18   Temp: 37.5 °C (99.5 °F)   SpO2: 100%       Triage process explained to patient, apologized for wait time, and returned to lobby.  Pt informed to notify staff of any change in condition. NAD at this time.

## 2020-08-21 NOTE — ED NOTES
Discharge education provided. Patient verbalizes understanding. Patient A/Ox4 and ambulatory to lobby. Prescriptions with patient. Patient discharged home to self care.

## 2020-08-21 NOTE — ED NOTES
"ED Positive Culture Follow-up/Notification Note:    Date: 8/21/2020     Patient seen in the ED on 8/20/2020 for left sided back/flank pain for 4-5 days with nausea and vomiting. Reports feeling feverish.   1. Acute left flank pain Acute   2. Acute UTI Active   3. Fever, unspecified fever cause Active   4. Non-intractable vomiting with nausea, unspecified vomiting type Acute   5. Dyspnea, unspecified type Acute     Ceftriaxone 2 g IV given at 2106 on 8/20/20     Discharge Medication List as of 8/20/2020 10:44 PM      START taking these medications    Details   cefdinir (OMNICEF) 300 MG Cap Take 1 Cap by mouth 2 times a day for 10 days., Disp-20 Cap,R-0, Print Rx Paper      ondansetron (ZOFRAN ODT) 4 MG TABLET DISPERSIBLE Take 1 Tab by mouth every 6 hours as needed., Disp-5 Tab,R-0, Print Rx Paper             Allergies: Patient has no known allergies.     Vitals:    08/20/20 2111 08/20/20 2141 08/20/20 2218 08/20/20 2244   BP:   126/65    Pulse:   80 78   Resp: 15  (!) 21 16   Temp:  38 °C (100.4 °F)     TempSrc:  Oral     SpO2:   95% 98%   Weight:       Height:           Final cultures:   Results     Procedure Component Value Units Date/Time    BLOOD CULTURE [[id number]]  (Abnormal) Collected: 08/20/20 2041    Order Status: Completed Specimen: Blood from Peripheral Updated: 08/21/20 1113     Significant Indicator POS     Source BLD     Site PERIPHERAL     Culture Result Growth detected by Bactec instrument. 08/21/2020  11:11  Gram Stain: Gram negative rods.      Narrative:      CALL  Hernandez  ER tel. ,  CALLED  ER tel.  08/21/2020, 11:12, RB PERF. RESULTS CALLED TO: [phone]  Per Hospital Policy: Only change Specimen Src: to \"Line\" if  specified by physician order.  Right Forearm/Arm    BLOOD CULTURE [[id number]]  (Abnormal) Collected: 08/20/20 2016    Order Status: Completed Specimen: Blood from Peripheral Updated: 08/21/20 0934     Significant Indicator POS     Source BLD     Site PERIPHERAL     Culture Result Growth " "detected by Bactec instrument. 08/21/2020  09:32  Gram Stain: Gram negative rods.      Narrative:      CALL  Hernandez  ER tel. ,  CALLED  ER tel.  08/21/2020, 09:34, RB PERF. RESULTS CALLED TO:[phone]  Per Hospital Policy: Only change Specimen Src: to \"Line\" if  specified by physician order.  Right    URINALYSIS,CULTURE IF INDICATED [[id number]]  (Abnormal) Collected: 08/20/20 1907    Order Status: Completed Specimen: Blood from Urine, Cath Updated: 08/20/20 1933     Color Yellow     Character Clear     Specific Gravity 1.011     Ph 6.5     Glucose Negative mg/dL      Ketones 80 mg/dL      Protein 100 mg/dL      Bilirubin Negative     Urobilinogen, Urine 1.0     Nitrite Negative     Leukocyte Esterase Moderate     Occult Blood Moderate     Micro Urine Req Microscopic    URINE CULTURE(NEW) [[id number]]     Order Status: Canceled           Plan:   Spoke to the patient regarding blood culture results. She has picked up the antibiotic and began taking it this morning. She continues to feel poorly. Explained the importance of her returning to the ER for further evaluation with positive blood cultures. Cefdinir does not have good urinary concentrations or bioavailability and likely would not be adequate to treat bacteremia.  If she returns, would recommend giving a additional dose of Ceftriaxone 1 g IV.     Criss Lazaro, PharmD    "

## 2020-08-21 NOTE — ED PROVIDER NOTES
ED Provider Note    Scribed for Simon Martell M.D. by Cee Roberts. 2020  1:36 PM    Primary care provider: Pcp Pt States None  Means of arrival: Walk-in  History obtained from: Patient  History limited by: None    CHIEF COMPLAINT  Chief Complaint   Patient presents with   • N/V   • Other     seen in ED yesterday, called by [facility] to return to ED for lab recheck and additional medication - +blood cultures reported.       HPI  Divine Elise is a 20 y.o. female who presents to the Emergency Department with nausea and vomiting today. Patient states she was seen here yesterday for constant lower left back pain, which she describes as sharp. The patient was called back to return to the ED by [facility] following positive blood cultures. She adds that she had an episode of increased pain and vomiting this morning. She notes that her pain is alleviated following an episode of vomiting. She denies any abdominal pain. She reports that she took her first dose of antibiotics this morning. Patient has been drinking lots of fluids. Patient has no allergies. Patient has no history of kidney stones.     REVIEW OF SYSTEMS  Pertinent negatives include no abdominal pain. As above, all other systems reviewed and are negative.   See HPI for further details.     PAST MEDICAL HISTORY   has a past medical history of Rh negative status during pregnancy (2020).    SURGICAL HISTORY   has a past surgical history that includes tonsillectomy; dental extraction(s); and  delivery only (N/A, 2020).    SOCIAL HISTORY  Social History     Tobacco Use   • Smoking status: Never Smoker   • Smokeless tobacco: Never Used   Substance Use Topics   • Alcohol use: Never     Frequency: Never   • Drug use: Yes     Types: Marijuana, Inhaled     Comment: Marijuana      Social History     Substance and Sexual Activity   Drug Use Yes   • Types: Marijuana, Inhaled    Comment: Marijuana       FAMILY HISTORY  Family History   Problem Relation Age of  "Onset   • Autism Brother    • Seizures Brother    • Cancer Maternal Aunt         Breast   • Diabetes Maternal Grandfather        CURRENT MEDICATIONS  Home Medications    **Home medications have not yet been reviewed for this encounter**         ALLERGIES  No Known Allergies    PHYSICAL EXAM  VITAL SIGNS: /84   Pulse (!) 102   Temp 36.1 °C (97 °F) (Temporal)   Resp 16   Ht 1.651 m (5' 5\")   Wt 97.9 kg (215 lb 13.3 oz)   LMP 08/07/2020 (Approximate)   SpO2 97%   Breastfeeding No   BMI 35.92 kg/m²     Constitutional: Well developed, Well nourished, No acute distress, Non-toxic appearance.   HENT: Normocephalic, Atraumatic, Bilateral external ears normal, Oropharynx is clear mucous membranes are moist. No oral exudates or nasal discharge.   Eyes: Pupils are equal round and reactive, EOMI, Conjunctiva normal, No discharge.   Neck: Normal range of motion, No tenderness, Supple, No stridor. No meningismus.  Lymphatic: No lymphadenopathy noted.   Cardiovascular: Regular rate and rhythm without murmur rub or gallop.  Thorax & Lungs: Clear breath sounds bilaterally without wheezes, rhonchi or rales. There is no chest wall tenderness.   Abdomen: Soft non-tender non-distended. There is no rebound or guarding. No organomegaly is appreciated. Bowel sounds are normal.  Skin: Normal without rash.   Back: Left side CVA tenderness,  Extremities: Intact distal pulses, No edema, No tenderness, No cyanosis, No clubbing. Capillary refill is less than 2 seconds.  Musculoskeletal: Good range of motion in all major joints. No tenderness to palpation or major deformities noted.   Neurologic: Alert & oriented x 3, Normal motor function, Normal sensory function, No focal deficits noted. Reflexes are normal.  Psychiatric: Affect normal, Judgment normal, Mood normal. There is no suicidal ideation or patient reported hallucinations.     DIAGNOSTIC STUDIES / PROCEDURES    LABS  Labs Reviewed   CBC WITH DIFFERENTIAL - Abnormal; " Notable for the following components:       Result Value    WBC 11.5 (*)     RBC 3.95 (*)     Hemoglobin 10.8 (*)     Hematocrit 32.6 (*)     MCHC 33.1 (*)     Neutrophils-Polys 78.30 (*)     Lymphocytes 12.10 (*)     Immature Granulocytes 1.20 (*)     Neutrophils (Absolute) 8.99 (*)     Immature Granulocytes (abs) 0.14 (*)     All other components within normal limits   COMP METABOLIC PANEL      All labs reviewed by me.    RADIOLOGY  US-RENAL   Final Result      1.  No hydronephrosis identified.      2.  Probable left nephrolithiasis        The radiologist's interpretation of all radiological studies have been reviewed by me.    COURSE & MEDICAL DECISION MAKING  Nursing notes, VS, PMSFHx reviewed in chart.    1:36 PM Patient seen and examined at bedside. Patient states she currently feels good. Ordered for labs and imaging to evaluate. Patient was treated with Rocephin 2 g, Bolus, Zofran 4 mg, and Potassium 20 mEq for her symptoms.      2:13 PM - Upon review of past medical charts, the patient was seen yesterday to have a white count of 14.4 and low potassium.     3:25 PM - Patient vitals have improved. With a white count down to 11,000. Patient is now stable for discharge.     3:26 PM - Patient was seen at bedside. I updated her on the plan of care including discharge home. She reports feeling much improved following medications. I advised her on all return precautions. Patient verbalizes understanding and agreement to this plan of care.     HYDRATION: Based on the patient's presentation of Tachycardia and NPO status the patient was given IV fluids. IV Hydration was used because oral hydration was not adequate alone. Upon recheck following hydration, the patient was improved.    Patient has had high blood pressure while in the emergency department, felt likely secondary to medical condition. Counseled patient to monitor blood pressure at home and follow up with primary care physician.      The patient will return  for new or worsening symptoms and is stable at the time of discharge.    DISPOSITION:  Patient will be discharged home in stable condition.  She will continue her oral antibiotics as directed and I have sent her some additional Zofran as needed to her pharmacy    FINAL IMPRESSION  1. Pyelonephritis          Cee WAGNER (Scribe), am scribing for, and in the presence of, Simon Martell M.D..    Electronically signed by: Cee Roberts (Crispinibe), 8/21/2020    Simon WAGNER M.D. personally performed the services described in this documentation, as scribed by Cee Roberts in my presence, and it is both accurate and complete. C.    The note accurately reflects work and decisions made by me.  Simon Martell M.D.  8/21/2020  3:30 PM

## 2020-08-23 LAB
BACTERIA BLD CULT: ABNORMAL
SIGNIFICANT IND 70042: ABNORMAL
SIGNIFICANT IND 70042: ABNORMAL
SITE SITE: ABNORMAL
SITE SITE: ABNORMAL
SOURCE SOURCE: ABNORMAL
SOURCE SOURCE: ABNORMAL

## 2020-08-27 ENCOUNTER — TELEPHONE (OUTPATIENT)
Dept: OBGYN | Facility: CLINIC | Age: 20
End: 2020-08-27

## 2020-08-27 NOTE — TELEPHONE ENCOUNTER
TC from pt who would like a note to go back to work.  Had incision check PP visit 5/29/20 but no other PP check up.   Informed pt she would need to be seen for us to clear her to go back to work.  Transferred to schedulers to make appt.

## 2020-08-28 ENCOUNTER — GYNECOLOGY VISIT (OUTPATIENT)
Dept: OBGYN | Facility: CLINIC | Age: 20
End: 2020-08-28
Payer: MEDICAID

## 2020-08-28 VITALS
HEIGHT: 65 IN | BODY MASS INDEX: 36.82 KG/M2 | DIASTOLIC BLOOD PRESSURE: 78 MMHG | SYSTOLIC BLOOD PRESSURE: 124 MMHG | WEIGHT: 221 LBS

## 2020-08-28 PROBLEM — Z34.00 SUPERVISION OF NORMAL FIRST PREGNANCY, ANTEPARTUM: Status: RESOLVED | Noted: 2020-02-12 | Resolved: 2020-08-28

## 2020-08-28 PROBLEM — Z67.91 RH NEGATIVE STATUS DURING PREGNANCY: Status: RESOLVED | Noted: 2020-04-29 | Resolved: 2020-08-28

## 2020-08-28 PROBLEM — O26.899 RH NEGATIVE STATUS DURING PREGNANCY: Status: RESOLVED | Noted: 2020-04-29 | Resolved: 2020-08-28

## 2020-08-28 PROCEDURE — 0503F POSTPARTUM CARE VISIT: CPT | Performed by: PHYSICIAN ASSISTANT

## 2020-08-28 ASSESSMENT — ENCOUNTER SYMPTOMS
CONSTITUTIONAL NEGATIVE: 1
NEUROLOGICAL NEGATIVE: 1
EYES NEGATIVE: 1
CARDIOVASCULAR NEGATIVE: 1
RESPIRATORY NEGATIVE: 1
GASTROINTESTINAL NEGATIVE: 1
PSYCHIATRIC NEGATIVE: 1
MUSCULOSKELETAL NEGATIVE: 1

## 2020-08-28 ASSESSMENT — FIBROSIS 4 INDEX: FIB4 SCORE: 0.28

## 2020-08-28 NOTE — LETTER
August 28, 2020       Patient: Divine Elise   YOB: 2000   Date of Visit: 8/28/2020         To Whom It May Concern:    In my medical opinion, I recommend that Divine Elise may return to full duty, no restrictions as of 8/28/20. Thank you.    If you have any questions or concerns, please don't hesitate to call [phone]          Sincerely,          KAUSHIK Ray.

## 2020-08-28 NOTE — PROGRESS NOTES
"Subjective:      Divine Elise is a 20 y.o. female who presents with Gynecologic Exam  Pt had primary LTCS at 25wk gestation on 5/14/20 due to PTD, breech presentation without surgical or anesthetic complications. Postpartum visit today. Baby doing well, over 9lb, though still in NICU as struggling with feedings. Pt hoping he will come home soon. Pt has no complaints- denies heavy vaginal bleeding, depression, intercourse, pain or BF. PAP LGSIL 2/20 - repeat 1 yr, pt aware. BCM desired is IUD, as pt had been using BCP without satisfaction. Pt to RTC 1 wk for placement of either Mirena or Clover or Lilietta IUD. D/w pt all options.             HPI    Review of Systems   Constitutional: Negative.    HENT: Negative.    Eyes: Negative.    Respiratory: Negative.    Cardiovascular: Negative.    Gastrointestinal: Negative.    Genitourinary: Negative.    Musculoskeletal: Negative.    Skin: Negative.    Neurological: Negative.    Endo/Heme/Allergies: Negative.    Psychiatric/Behavioral: Negative.    All other systems reviewed and are negative.         Objective:     /78 (BP Location: Right arm, Patient Position: Sitting)   Ht 1.651 m (5' 5\")   Wt 100.2 kg (221 lb)   LMP 08/07/2020 (Approximate)   BMI 36.78 kg/m²      Physical Exam  Vitals signs reviewed.   Constitutional:       Appearance: She is well-developed.   HENT:      Head: Normocephalic and atraumatic.   Eyes:      Pupils: Pupils are equal, round, and reactive to light.   Neck:      Musculoskeletal: Normal range of motion and neck supple.      Thyroid: No thyromegaly.   Cardiovascular:      Rate and Rhythm: Normal rate and regular rhythm.      Heart sounds: Normal heart sounds.   Pulmonary:      Effort: Pulmonary effort is normal. No respiratory distress.      Breath sounds: Normal breath sounds.   Abdominal:      General: Bowel sounds are normal. There is no distension.      Palpations: Abdomen is soft.      Tenderness: There is no abdominal tenderness.     "  Comments: C/S incision C/D/I without erythema or induration   Genitourinary:     Exam position: Supine.      Uterus: Not enlarged and not tender.    Skin:     General: Skin is warm and dry.      Findings: No erythema.   Neurological:      Mental Status: She is alert.      Deep Tendon Reflexes: Reflexes are normal and symmetric.   Psychiatric:         Behavior: Behavior normal.         Thought Content: Thought content normal.                 Assessment/Plan:        1. Postpartum care following  delivery  - PAP LGSIL  - repeat 1 yr  - RTC 1 wk for IUD placement of Mirena, Liletta or Clover

## 2020-08-28 NOTE — NON-PROVIDER
Patient is here because she missed her PP visit and needs letter to go back to work.  Patient is bottle feeding  Not using anything for BC. Would like pills. Would like to discuss with provider  2/2020 last pap. LGSIL. Needs f/u pap in 1 year  Phone number: [phone]  Pharmacy verified

## 2020-09-04 ENCOUNTER — GYNECOLOGY VISIT (OUTPATIENT)
Dept: OBGYN | Facility: CLINIC | Age: 20
End: 2020-09-04
Payer: MEDICAID

## 2020-09-04 VITALS — DIASTOLIC BLOOD PRESSURE: 64 MMHG | BODY MASS INDEX: 34.28 KG/M2 | SYSTOLIC BLOOD PRESSURE: 110 MMHG | WEIGHT: 206 LBS

## 2020-09-04 DIAGNOSIS — Z30.014 ENCOUNTER FOR INITIAL PRESCRIPTION OF INTRAUTERINE CONTRACEPTIVE DEVICE (IUD): ICD-10-CM

## 2020-09-04 DIAGNOSIS — Z30.430 ENCOUNTER FOR IUD INSERTION: Primary | ICD-10-CM

## 2020-09-04 LAB
INT CON NEG: NEGATIVE
INT CON POS: POSITIVE
POC URINE PREGNANCY TEST: NEGATIVE

## 2020-09-04 PROCEDURE — 58300 INSERT INTRAUTERINE DEVICE: CPT | Performed by: NURSE PRACTITIONER

## 2020-09-04 PROCEDURE — 81025 URINE PREGNANCY TEST: CPT | Performed by: NURSE PRACTITIONER

## 2020-09-04 ASSESSMENT — FIBROSIS 4 INDEX: FIB4 SCORE: 0.28

## 2020-09-04 NOTE — PROCEDURES
IUD Insertion    Date/Time: 9/4/2020 1:38 PM  Performed by: EDGAR Garcia  Authorized by: EDGAR Garcia     Consent:     Consent obtained:  Verbal and written    Consent given by:  Patient    Procedure risks and benefits discussed: yes      Patient questions answered: yes      Patient agrees, verbalizes understanding, and wants to proceed: yes      Educational handouts given: yes      Instructions and paperwork completed: yes    Pre-procedure details:     Negative urine pregnancy test: yes    Procedure:     Pelvic exam performed: yes      Sterile speculum placed in vagina: yes      Cervix visualized: yes      Cervix cleaned and prepped in sterile fashion: yes      Tenaculum applied to cervix: yes      Dilation needed: no      Uterus sounded: yes      IUD inserted with no complications: yes      IUD type:  Mirena    Strings trimmed: yes    Post-procedure:     Patient tolerated procedure well: yes      Patient will follow up after next period: yes        IUD: Mirena  is choice:    Today the patient is counseled on the risks of IUD insertion. Specifically discussed were alternative forms of birth control. I also discussed with the patient the risk of infection on insertion, and had asked the patient to remain on pelvic rest for one week following the insertion. We also discussed the risk of IUD expulsion, the risk of uterine perforation and IUD migration. If the IUD does migrate the patient may require a separate procedure such as a laparoscopy to retrieve the migrated IUD. I also discussed the 1% risk of pregnancy with IUD use. I also discussed the side effects of Mirena IUD which can be amenorrhea or dysfunctional uterine bleeding or spotting.  Patient had the opportunity to ask questions regarding insertion, risks and benefits, all questions are answered in their entirety.  Informed consent is signed    Procedure note  Urine pregnancy test is negative, informed consent was previously  signed  The bimanual exam is performed the uterus is noted to be 8 weeks in size and is mid position  A speculum was inserted into the vagina, the cervix was cleansed with Betadine swabs x3  Tenaculum was placed on the anterior lip of the cervix at 11:00 and 1:00   The uterus was sounded to 8 centimeters  The IUD is placed under sterile conditions: no complications  The strings trimmed to approximately 3 cm  Tenaculum was removed from the cervix and hemostasis was achieved with pressure only  The patient tolerated the procedure well      Lot: [id number]  Exp: NOV 2022      Patient is asked to followup in 4-6 weeks for IUD check. The patient is asked to remain on pelvic rest for 2-3 days.  Use a backup method for 7 days, condoms. She is asked to return sooner than 4-6 weeks for heavy vaginal bleeding uncontrolled pain or fever

## 2020-10-08 ENCOUNTER — GYNECOLOGY VISIT (OUTPATIENT)
Dept: OBGYN | Facility: CLINIC | Age: 20
End: 2020-10-08
Payer: MEDICAID

## 2020-10-08 VITALS — WEIGHT: 220 LBS | DIASTOLIC BLOOD PRESSURE: 78 MMHG | BODY MASS INDEX: 36.61 KG/M2 | SYSTOLIC BLOOD PRESSURE: 122 MMHG

## 2020-10-08 DIAGNOSIS — Z30.431 IUD CHECK UP: ICD-10-CM

## 2020-10-08 PROCEDURE — 99212 OFFICE O/P EST SF 10 MIN: CPT | Performed by: NURSE PRACTITIONER

## 2020-10-08 ASSESSMENT — FIBROSIS 4 INDEX: FIB4 SCORE: 0.28

## 2020-10-08 NOTE — NON-PROVIDER
"Pt here for IUD string check. IUD placed on 9/4/2020.    Pt states she had some \"tissue\" expel from her vagina   Good# [phone]  LMP: Unknown   Pharmacy confirmed.   "

## 2020-10-08 NOTE — PROGRESS NOTES
Pt here for IUD string check  Got Mirena on 9/4/20  Reports no issues since then other than had some d/c come out twice that was thicker and stretchy no itch, odor, irritation  Since insertion has seen darker brown clearish d/c but this recently stopped   Has not had any sex yet    Photo of tissue looks like normal blood tinged cervical mucous  IUD strings noted at external cervical os    Reviewed returning in one year for f/u pap and IUD check  Reviewed how to check for IUD strings  Reviewed normalcy of d/c and changes due to hormonal IUD and to report with any fish/green/itchy/thick abnormal d/c  Reviewed normalcy of spotting and irregular bleeding for 3-6 months after placement of IUD

## 2021-11-23 ENCOUNTER — HOSPITAL ENCOUNTER (OUTPATIENT)
Facility: MEDICAL CENTER | Age: 21
End: 2021-11-23
Attending: FAMILY MEDICINE
Payer: MEDICAID

## 2021-11-23 ENCOUNTER — OFFICE VISIT (OUTPATIENT)
Dept: URGENT CARE | Facility: PHYSICIAN GROUP | Age: 21
End: 2021-11-23
Payer: MEDICAID

## 2021-11-23 VITALS
RESPIRATION RATE: 14 BRPM | WEIGHT: 170 LBS | DIASTOLIC BLOOD PRESSURE: 66 MMHG | TEMPERATURE: 97.7 F | HEIGHT: 64 IN | HEART RATE: 84 BPM | SYSTOLIC BLOOD PRESSURE: 122 MMHG | BODY MASS INDEX: 29.02 KG/M2 | OXYGEN SATURATION: 98 %

## 2021-11-23 DIAGNOSIS — Z03.818 ENCOUNTER FOR PATIENT CONCERN ABOUT EXPOSURE TO INFECTIOUS ORGANISM: ICD-10-CM

## 2021-11-23 PROCEDURE — U0003 INFECTIOUS AGENT DETECTION BY NUCLEIC ACID (DNA OR RNA); SEVERE ACUTE RESPIRATORY SYNDROME CORONAVIRUS 2 (SARS-COV-2) (CORONAVIRUS DISEASE [COVID-19]), AMPLIFIED PROBE TECHNIQUE, MAKING USE OF HIGH THROUGHPUT TECHNOLOGIES AS DESCRIBED BY CMS-2020-01-R: HCPCS

## 2021-11-23 PROCEDURE — 99203 OFFICE O/P NEW LOW 30 MIN: CPT | Mod: CS | Performed by: FAMILY MEDICINE

## 2021-11-23 PROCEDURE — U0005 INFEC AGEN DETEC AMPLI PROBE: HCPCS

## 2021-11-23 ASSESSMENT — FIBROSIS 4 INDEX: FIB4 SCORE: 0.3

## 2021-11-23 NOTE — PROGRESS NOTES
"  Subjective:      21 y.o. female presents to urgent care for cold symptoms since Sunday.  She is experiencing cough, headache, and increased facial pressure.  No associated body ache, vomiting, diarrhea, or fever.  She has been using Tylenol Mucinex which has been helping her symptoms.  She denies any tobacco product use.  No history of asthma or COPD.  She is not vaccinated against Covid.  Her mom and son are sick with similar symptoms.    She denies any other questions or concerns at this time.    Current problem list, medication, and past medical/surgical history were reviewed in Epic.    ROS  See HPI     Objective:      /66   Pulse 84   Temp 36.5 °C (97.7 °F)   Resp 14   Ht 1.626 m (5' 4\")   Wt 77.1 kg (170 lb)   SpO2 98%   BMI 29.18 kg/m²     Physical Exam  Constitutional:       General: She is not in acute distress.     Appearance: She is not diaphoretic.   HENT:      Right Ear: Tympanic membrane, ear canal and external ear normal.      Left Ear: Tympanic membrane, ear canal and external ear normal.      Nose:      Right Sinus: No maxillary sinus tenderness or frontal sinus tenderness.      Left Sinus: No maxillary sinus tenderness or frontal sinus tenderness.      Mouth/Throat:      Palate: No lesions.      Pharynx: Uvula midline. Posterior oropharyngeal erythema present.      Tonsils: No tonsillar exudate. 2+ on the right. 2+ on the left.   Cardiovascular:      Rate and Rhythm: Normal rate and regular rhythm.      Heart sounds: Normal heart sounds.   Pulmonary:      Effort: Pulmonary effort is normal. No respiratory distress.      Breath sounds: Normal breath sounds.   Neurological:      Mental Status: She is alert.   Psychiatric:         Mood and Affect: Affect normal.         Judgment: Judgment normal.       Assessment/Plan:     1. Encounter for patient concern about exposure to infectious organism  Testing performed for COVID-19. In the meantime patient was advised to isolate until COVID " test results returned. I encouraged mask use, frequent handwashing, wiping down hard surfaces, etc. Tylenol and Ibuprofen were recommended for symptomatic relief. If positive they will be contacted by their local health department regarding return to work/school protocols. Patient is currently without indication of need for higher level of care. Patient/Guardian was given precautionary signs/symptoms that mandate immediate follow up and evaluation in the ED. The patient and/or guardian demonstrated a good understanding and agreed with the treatment plan.  - SARS-CoV-2 PCR (24 hour In-House): Collect NP swab in VTM; Future      Instructed to return to Urgent Care or nearest Emergency Department if symptoms fail to improve, for any change in condition, further concerns, or new concerning symptoms. Patient states understanding of the plan of care and discharge instructions.    Alycia Elizabeth M.D.

## 2021-11-24 DIAGNOSIS — Z03.818 ENCOUNTER FOR PATIENT CONCERN ABOUT EXPOSURE TO INFECTIOUS ORGANISM: ICD-10-CM

## 2021-11-24 LAB
COVID ORDER STATUS COVID19: NORMAL
SARS-COV-2 RNA RESP QL NAA+PROBE: NOTDETECTED
SPECIMEN SOURCE: NORMAL

## 2021-11-26 ENCOUNTER — OFFICE VISIT (OUTPATIENT)
Dept: URGENT CARE | Facility: PHYSICIAN GROUP | Age: 21
End: 2021-11-26
Payer: MEDICAID

## 2021-11-26 VITALS
OXYGEN SATURATION: 97 % | DIASTOLIC BLOOD PRESSURE: 78 MMHG | TEMPERATURE: 98 F | SYSTOLIC BLOOD PRESSURE: 136 MMHG | BODY MASS INDEX: 27.82 KG/M2 | HEART RATE: 110 BPM | WEIGHT: 167 LBS | RESPIRATION RATE: 18 BRPM | HEIGHT: 65 IN

## 2021-11-26 DIAGNOSIS — J06.9 UPPER RESPIRATORY TRACT INFECTION, UNSPECIFIED TYPE: ICD-10-CM

## 2021-11-26 DIAGNOSIS — R06.02 SOB (SHORTNESS OF BREATH): ICD-10-CM

## 2021-11-26 DIAGNOSIS — R06.2 WHEEZING: ICD-10-CM

## 2021-11-26 PROCEDURE — 99214 OFFICE O/P EST MOD 30 MIN: CPT | Performed by: PHYSICIAN ASSISTANT

## 2021-11-26 RX ORDER — AZITHROMYCIN 250 MG/1
TABLET, FILM COATED ORAL
Qty: 6 TABLET | Refills: 0 | Status: SHIPPED | OUTPATIENT
Start: 2021-11-26 | End: 2023-01-05

## 2021-11-26 RX ORDER — ALBUTEROL SULFATE 90 UG/1
2 AEROSOL, METERED RESPIRATORY (INHALATION) EVERY 6 HOURS PRN
Qty: 8.5 G | Refills: 1 | Status: SHIPPED | OUTPATIENT
Start: 2021-11-26

## 2021-11-26 RX ORDER — PREDNISONE 10 MG/1
TABLET ORAL
Qty: 15 TABLET | Refills: 0 | Status: SHIPPED | OUTPATIENT
Start: 2021-11-26 | End: 2023-01-05

## 2021-11-26 ASSESSMENT — FIBROSIS 4 INDEX: FIB4 SCORE: 0.3

## 2021-11-26 NOTE — PROGRESS NOTES
"Chief Complaint   Patient presents with   • Cough     wheezing        HISTORY OF PRESENT ILLNESS: Patient is a 21 y.o. female who presents today for the following:    Cough x5 to 7 days  + Shortness of breath, wheezing  No history of asthma  Denies fever, known Covid exposure  Was seen 11/23 in the urgent care     Patient Active Problem List    Diagnosis Date Noted   • LGSIL on Pap smear of cervix - f/u PAP 1 yr 04/24/2020       Allergies:Patient has no known allergies.    Current Outpatient Medications Ordered in Epic   Medication Sig Dispense Refill   • predniSONE (DELTASONE) 10 MG Tab 50 mg x 1 day; 40 mg x 1 day; 30 mg x 1 day; 20 mg x 1 day; 10 mg x 1 day 15 Tablet 0   • azithromycin (ZITHROMAX) 250 MG Tab ANTIBIOTIC Use as package directs 6 Tablet 0   • albuterol 108 (90 Base) MCG/ACT Aero Soln inhalation aerosol Inhale 2 Puffs every 6 hours as needed. 8.5 g 1     No current [facility]-ordered facility-administered medications on file.       Past Medical History:   Diagnosis Date   • Rh negative status during pregnancy 4/29/2020       Social History     Tobacco Use   • Smoking status: Never Smoker   • Smokeless tobacco: Never Used   Vaping Use   • Vaping Use: Never used   Substance Use Topics   • Alcohol use: Never   • Drug use: Yes     Types: Marijuana, Inhaled     Comment: Marijuana       Family Status   Relation Name Status   • Bro  Alive   • MAunt  (Not Specified)   • MGFa  Alive     Family History   Problem Relation Age of Onset   • Autism Brother    • Seizures Brother    • Cancer Maternal Aunt         Breast   • Diabetes Maternal Grandfather        Review of Systems:   Pertinent systems reviewed with the patient.      Exam:  /78   Pulse (!) 110   Temp 36.7 °C (98 °F)   Resp 18   Ht 1.651 m (5' 5\")   Wt 75.8 kg (167 lb)   SpO2 97%   General: Well developed, well nourished. No distress.    Eye: PERRL/EOMI; conjunctivae clear, lids normal.  ENMT: Lips without lesions, MMM. Oropharynx is clear. " Bilateral TMs are within normal limits.  Pulmonary: Unlabored respiratory effort. Diffuse inspiratory expiratory wheezes.  Cardiovascular: Regular rate and rhythm without murmur.   Neurologic: Grossly nonfocal. No facial asymmetry noted.  Lymph: No cervical lymphadenopathy noted.  Skin: Warm, dry, good turgor. No rashes in visible areas.   Psych: Normal mood. Alert and oriented to person, place and time.    Assessment/Plan:  Discussed likely viral etiology.  Vitals and exam are unremarkable.  Low suspicion for pneumonia. Start prednisone.  Contingent antibiotic prescription given to patient to fill upon meeting criteria of guidelines discussed.  Discussed appropriate over-the-counter symptomatic medication, and when to return to clinic. Follow up for worsening or persistent symptoms.  1. Upper respiratory tract infection, unspecified type  azithromycin (ZITHROMAX) 250 MG Tab   2. SOB (shortness of breath)  predniSONE (DELTASONE) 10 MG Tab    albuterol 108 (90 Base) MCG/ACT Aero Soln inhalation aerosol   3. Wheezing  predniSONE (DELTASONE) 10 MG Tab    albuterol 108 (90 Base) MCG/ACT Aero Soln inhalation aerosol

## 2022-03-15 ENCOUNTER — GYNECOLOGY VISIT (OUTPATIENT)
Dept: OBGYN | Facility: CLINIC | Age: 22
End: 2022-03-15
Payer: MEDICAID

## 2022-03-15 ENCOUNTER — HOSPITAL ENCOUNTER (OUTPATIENT)
Facility: MEDICAL CENTER | Age: 22
End: 2022-03-15
Attending: NURSE PRACTITIONER
Payer: MEDICAID

## 2022-03-15 ENCOUNTER — HOSPITAL ENCOUNTER (EMERGENCY)
Facility: MEDICAL CENTER | Age: 22
End: 2022-03-15
Attending: EMERGENCY MEDICINE
Payer: MEDICAID

## 2022-03-15 ENCOUNTER — APPOINTMENT (OUTPATIENT)
Dept: RADIOLOGY | Facility: MEDICAL CENTER | Age: 22
End: 2022-03-15
Attending: EMERGENCY MEDICINE
Payer: MEDICAID

## 2022-03-15 VITALS
BODY MASS INDEX: 30.71 KG/M2 | SYSTOLIC BLOOD PRESSURE: 118 MMHG | HEART RATE: 73 BPM | OXYGEN SATURATION: 92 % | RESPIRATION RATE: 18 BRPM | TEMPERATURE: 98.6 F | HEIGHT: 65 IN | WEIGHT: 184.3 LBS | DIASTOLIC BLOOD PRESSURE: 70 MMHG

## 2022-03-15 VITALS
WEIGHT: 184 LBS | HEIGHT: 65 IN | BODY MASS INDEX: 30.66 KG/M2 | DIASTOLIC BLOOD PRESSURE: 70 MMHG | SYSTOLIC BLOOD PRESSURE: 120 MMHG

## 2022-03-15 DIAGNOSIS — Z33.1 IUD PREGNANCY: Primary | ICD-10-CM

## 2022-03-15 DIAGNOSIS — R87.612 LGSIL OF CERVIX OF UNDETERMINED SIGNIFICANCE: ICD-10-CM

## 2022-03-15 DIAGNOSIS — T83.31XA IUD PREGNANCY: Primary | ICD-10-CM

## 2022-03-15 DIAGNOSIS — R10.2 PELVIC PAIN: ICD-10-CM

## 2022-03-15 DIAGNOSIS — Z32.01 POSITIVE PREGNANCY TEST: ICD-10-CM

## 2022-03-15 DIAGNOSIS — N89.8 VAGINAL DISCHARGE: ICD-10-CM

## 2022-03-15 LAB
ALBUMIN SERPL BCP-MCNC: 4.6 G/DL (ref 3.2–4.9)
ALBUMIN/GLOB SERPL: 1.6 G/DL
ALP SERPL-CCNC: 69 U/L (ref 30–99)
ALT SERPL-CCNC: 11 U/L (ref 2–50)
ANION GAP SERPL CALC-SCNC: 10 MMOL/L (ref 7–16)
APPEARANCE UR: NORMAL
AST SERPL-CCNC: 17 U/L (ref 12–45)
B-HCG SERPL-ACNC: <1 MIU/ML (ref 0–5)
BASOPHILS # BLD AUTO: 0.6 % (ref 0–1.8)
BASOPHILS # BLD: 0.05 K/UL (ref 0–0.12)
BILIRUB SERPL-MCNC: 0.4 MG/DL (ref 0.1–1.5)
BILIRUB UR STRIP-MCNC: NORMAL MG/DL
BUN SERPL-MCNC: 13 MG/DL (ref 8–22)
CALCIUM SERPL-MCNC: 9.4 MG/DL (ref 8.5–10.5)
CHLORIDE SERPL-SCNC: 106 MMOL/L (ref 96–112)
CO2 SERPL-SCNC: 25 MMOL/L (ref 20–33)
COLOR UR AUTO: NORMAL
CREAT SERPL-MCNC: 0.85 MG/DL (ref 0.5–1.4)
EOSINOPHIL # BLD AUTO: 0.15 K/UL (ref 0–0.51)
EOSINOPHIL NFR BLD: 1.9 % (ref 0–6.9)
ERYTHROCYTE [DISTWIDTH] IN BLOOD BY AUTOMATED COUNT: 41.4 FL (ref 35.9–50)
GFR SERPLBLD CREATININE-BSD FMLA CKD-EPI: 98 ML/MIN/1.73 M 2
GLOBULIN SER CALC-MCNC: 2.9 G/DL (ref 1.9–3.5)
GLUCOSE SERPL-MCNC: 80 MG/DL (ref 65–99)
GLUCOSE UR STRIP.AUTO-MCNC: NEGATIVE MG/DL
HCT VFR BLD AUTO: 40.3 % (ref 37–47)
HGB BLD-MCNC: 14.3 G/DL (ref 12–16)
IMM GRANULOCYTES # BLD AUTO: 0.03 K/UL (ref 0–0.11)
IMM GRANULOCYTES NFR BLD AUTO: 0.4 % (ref 0–0.9)
INT CON NEG: NEGATIVE
INT CON POS: POSITIVE
KETONES UR STRIP.AUTO-MCNC: NORMAL MG/DL
LEUKOCYTE ESTERASE UR QL STRIP.AUTO: NORMAL
LIPASE SERPL-CCNC: 51 U/L (ref 11–82)
LYMPHOCYTES # BLD AUTO: 2.52 K/UL (ref 1–4.8)
LYMPHOCYTES NFR BLD: 31.5 % (ref 22–41)
MCH RBC QN AUTO: 31.3 PG (ref 27–33)
MCHC RBC AUTO-ENTMCNC: 35.5 G/DL (ref 33.6–35)
MCV RBC AUTO: 88.2 FL (ref 81.4–97.8)
MONOCYTES # BLD AUTO: 0.42 K/UL (ref 0–0.85)
MONOCYTES NFR BLD AUTO: 5.3 % (ref 0–13.4)
NEUTROPHILS # BLD AUTO: 4.82 K/UL (ref 2–7.15)
NEUTROPHILS NFR BLD: 60.3 % (ref 44–72)
NITRITE UR QL STRIP.AUTO: NEGATIVE
NRBC # BLD AUTO: 0 K/UL
NRBC BLD-RTO: 0 /100 WBC
NUMBER OF RH DOSES IND 8505RD: 1
PH UR STRIP.AUTO: 6 [PH] (ref 5–8)
PLATELET # BLD AUTO: 230 K/UL (ref 164–446)
PMV BLD AUTO: 10.7 FL (ref 9–12.9)
POC URINE PREGNANCY TEST: POSITIVE
POTASSIUM SERPL-SCNC: 3.4 MMOL/L (ref 3.6–5.5)
PROT SERPL-MCNC: 7.5 G/DL (ref 6–8.2)
PROT UR QL STRIP: 30 MG/DL
RBC # BLD AUTO: 4.57 M/UL (ref 4.2–5.4)
RBC UR QL AUTO: NORMAL
RH BLD: NORMAL
SODIUM SERPL-SCNC: 141 MMOL/L (ref 135–145)
SP GR UR STRIP.AUTO: 1.02
UROBILINOGEN UR STRIP-MCNC: NORMAL MG/DL
WBC # BLD AUTO: 8 K/UL (ref 4.8–10.8)

## 2022-03-15 PROCEDURE — 83690 ASSAY OF LIPASE: CPT

## 2022-03-15 PROCEDURE — 85025 COMPLETE CBC W/AUTO DIFF WBC: CPT

## 2022-03-15 PROCEDURE — 87510 GARDNER VAG DNA DIR PROBE: CPT

## 2022-03-15 PROCEDURE — 80053 COMPREHEN METABOLIC PANEL: CPT

## 2022-03-15 PROCEDURE — 87491 CHLMYD TRACH DNA AMP PROBE: CPT

## 2022-03-15 PROCEDURE — 99284 EMERGENCY DEPT VISIT MOD MDM: CPT

## 2022-03-15 PROCEDURE — 87591 N.GONORRHOEAE DNA AMP PROB: CPT

## 2022-03-15 PROCEDURE — 84702 CHORIONIC GONADOTROPIN TEST: CPT

## 2022-03-15 PROCEDURE — 81025 URINE PREGNANCY TEST: CPT | Performed by: NURSE PRACTITIONER

## 2022-03-15 PROCEDURE — 86901 BLOOD TYPING SEROLOGIC RH(D): CPT

## 2022-03-15 PROCEDURE — 88175 CYTOPATH C/V AUTO FLUID REDO: CPT

## 2022-03-15 PROCEDURE — 87480 CANDIDA DNA DIR PROBE: CPT

## 2022-03-15 PROCEDURE — 87660 TRICHOMONAS VAGIN DIR PROBE: CPT

## 2022-03-15 PROCEDURE — 99214 OFFICE O/P EST MOD 30 MIN: CPT | Mod: 25 | Performed by: NURSE PRACTITIONER

## 2022-03-15 PROCEDURE — 81002 URINALYSIS NONAUTO W/O SCOPE: CPT | Performed by: NURSE PRACTITIONER

## 2022-03-15 ASSESSMENT — FIBROSIS 4 INDEX
FIB4 SCORE: 0.3
FIB4 SCORE: 0.3

## 2022-03-15 ASSESSMENT — LIFESTYLE VARIABLES: DO YOU DRINK ALCOHOL: NO

## 2022-03-15 NOTE — NON-PROVIDER
Patient here for GYN exam.  C/o breast tenderness, pain with intercourse, maybe some vaginal discharge. States wants to get IUD removed, desires pregnancy  LMP= n/a  BCM: mirena placed 9/4/2020  Last pap: 2/2020 LGSIL. Need repeat  Phone number: [phone]  Pharmacy verified

## 2022-03-15 NOTE — ED TRIAGE NOTES
"Chief Complaint   Patient presents with   • Abdominal Pain     Pt reports that today at urgent care she was told she was pregnant, she also received a pap smear. Pt reports she has an IUD, was told at the urgent care that the string was in the correct place. Pt's main complaint and her reason for going to urgent care was because of left lower abdominal pain/cramping that now radiates to her left leg. Pt states she has a full period last week (3 days of heavy bleeding).     /67   Pulse 85   Temp 36.2 °C (97.1 °F) (Oral)   Resp 16   Ht 1.651 m (5' 5\")   Wt 83.6 kg (184 lb 4.9 oz)   LMP 03/07/2022 (Approximate)   SpO2 100%   BMI 30.67 kg/m²     Pt is ambulatory in and out of triage. Appropriate PPE worn throughout entire encounter. Pt placed back in the lobby and educated about triage process.    "

## 2022-03-15 NOTE — PROGRESS NOTES
HPI Comments:  Divine Elise is a 21 y.o. female who presents for problem gyn visit: reports a lot of breast tenderness.  Last had this breast tenderness when she was pregnant.  Also notes some discomfort with intercourse.  Reports some left sided pelvic pain. Wants to know if everything is OK with her IUD.  Desires a pregnancy and would like the IUD removed as soon as possible. Also notes vaginal spotting.  Denies any severe abdominal pain.    Review of Systems :  Constitutional: none  EENT: none  Cardio: none  Resp: none  GI: none  : none  Pertinent positives documented in HPI and all other systems reviewed & are negative    All PMH, PSH, allergies, social history and FH reviewed and updated today:  Past Medical History:   Diagnosis Date   • Rh negative status during pregnancy 2020     Past Surgical History:   Procedure Laterality Date   • [place]  DELIVERY ONLY N/A 2020    Procedure:  SECTION, PRIMARY;  Surgeon: Marisol Cabrera D.O.;  Location: LABOR AND DELIVERY;  Service: Obstetrics   • DENTAL EXTRACTION(S)     • PRIMARY C SECTION     • TONSILLECTOMY       Patient has no known allergies.  Social History     Socioeconomic History   • Marital status: Single   Tobacco Use   • Smoking status: Never Smoker   • Smokeless tobacco: Never Used   Vaping Use   • Vaping Use: Never used   Substance and Sexual Activity   • Alcohol use: Never   • Drug use: Yes     Types: Marijuana, Inhaled     Comment: Marijuana   • Sexual activity: Yes     Partners: Male     Birth control/protection: I.U.D.     Comment: IUD on 2020     Family History   Problem Relation Age of Onset   • Autism Brother    • Seizures Brother    • Cancer Maternal Aunt         Breast   • Diabetes Maternal Grandfather      Medications:   Current Outpatient Medications Ordered in Epic   Medication Sig Dispense Refill   • predniSONE (DELTASONE) 10 MG Tab 50 mg x 1 day; 40 mg x 1 day; 30 mg x 1 day; 20 mg x 1 day; 10 mg x 1 day (Patient not  "taking: Reported on 3/15/2022) 15 Tablet 0   • azithromycin (ZITHROMAX) 250 MG Tab ANTIBIOTIC Use as package directs (Patient not taking: Reported on 3/15/2022) 6 Tablet 0   • albuterol 108 (90 Base) MCG/ACT Aero Soln inhalation aerosol Inhale 2 Puffs every 6 hours as needed. (Patient not taking: Reported on 3/15/2022) 8.5 g 1     No current Epic-ordered facility-administered medications on file.          Objective:   Vital measurements:  /70 (BP Location: Right arm, Patient Position: Sitting, BP Cuff Size: Adult)   Ht 1.651 m (5' 5\")   Wt 83.5 kg (184 lb)   Body mass index is 30.62 kg/m². (Goal BM I>18 <25)  UPT: positive    Physical Exam   Nursing note and vitals reviewed.  Constitutional: She is oriented to person, place, and time. She appears well-developed and well-nourished. No distress.     Abdominal: Soft. Bowel sounds are normal. She exhibits no distension and no mass. No tenderness. She has no rebound and no guarding.     Breast: deferred    Genitourinary:  Pelvic exam was performed with patient supine.  External genitalia with no abnormal pigmentation, labial fusion,rash, tenderness, lesion or injury to the labia bilaterally.  Vagina is moist with no lesions, foul discharge, erythema, tenderness or bleeding. No foreign body around the vagina or signs of injury.   Cervix exhibits no motion tenderness, no discharge and no friability. IUD strings visualized at os. 4cm.    Uterus is not deviated, not enlarged, not fixed and not tender.  Right adnexum displays no mass, no tenderness and no fullness. Left adnexum displays no mass, no tenderness and no fullness.     Neurological: She is alert and oriented to person, place, and time. She exhibits normal muscle tone.     Skin: Skin is warm and dry. No rash noted. She is not diaphoretic. No erythema. No pallor.     Psychiatric: She has a normal mood and affect. Her behavior is normal. Judgment and thought content normal.        Assessment:     1. IUD " pregnancy     2. Vaginal discharge  POCT Pregnancy    POCT Urinalysis    VAGINAL PATHOGENS DNA PANEL   3. LGSIL of cervix of undetermined significance  THINPREP RFLX HPV ASCUS W/CTNG   4. Positive pregnancy test  HCG QUANTITATIVE    HCG QUANTITATIVE         Plan:   Gyn exam performed   Pap/GCCT and vaginal pathogen swab done.  Beta quants x 2 ordered. Stressed importance of getting labs done prior to US appt on Fri.  Reviewed options for IUD with pregnancy.  Pt will get more information after Fri appt.   Come to hospital for severe abdominal pain.  Encourage PNV.  F/u asap on 3/18 w Dr. Benson    Chaperone offered and provided by Paula Luis MA.

## 2022-03-16 LAB
C TRACH DNA GENITAL QL NAA+PROBE: NEGATIVE
CANDIDA DNA VAG QL PROBE+SIG AMP: NEGATIVE
CYTOLOGY REG CYTOL: NORMAL
G VAGINALIS DNA VAG QL PROBE+SIG AMP: POSITIVE
N GONORRHOEA DNA GENITAL QL NAA+PROBE: NEGATIVE
SPECIMEN SOURCE: NORMAL
T VAGINALIS DNA VAG QL PROBE+SIG AMP: NEGATIVE

## 2022-03-16 NOTE — ED PROVIDER NOTES
ED Provider Note    CHIEF COMPLAINT   Chief Complaint   Patient presents with   • Abdominal Pain     Pt reports that today at urgent care she was told she was pregnant, she also received a pap smear. Pt reports she has an IUD, was told at the urgent care that the string was in the correct place. Pt's main complaint and her reason for going to urgent care was because of left lower abdominal pain/cramping that now radiates to her left leg. Pt states she has a full period last week (3 days of heavy bleeding).       HPI   Divine Elise is a 21 y.o. female who presents to the emergency department for first trimester vaginal bleeding. The patient is  2 para 1 with a last menstrual period of a week ago.  She began to experience vaginal bleeding last week which was heavier than normal and states it has been present for 3 days.  She went to the urgent care today as she was having left-sided pelvic pain, her pregnancy test was positive and she was sent to the gynecology clinic.  She states she had blood work and an ultrasound outpatient ordered and she is to have her IUD taken out on Friday as she is pregnant.  She comes here today complaining of the pain in her lower abdomen cramping sensation.     REVIEW OF SYSTEMS  See HPI for further details. All other systems are negative.     PAST MEDICAL HISTORY   has a past medical history of Rh negative status during pregnancy (2020).    FAMILY HISTORY  Family History   Problem Relation Age of Onset   • Autism Brother    • Seizures Brother    • Cancer Maternal Aunt         Breast   • Diabetes Maternal Grandfather        SOCIAL HISTORY  Social History     Tobacco Use   • Smoking status: Never Smoker   • Smokeless tobacco: Never Used   Vaping Use   • Vaping Use: Never used   Substance and Sexual Activity   • Alcohol use: Never   • Drug use: Yes     Types: Marijuana, Inhaled     Comment: Marijuana   • Sexual activity: Yes     Partners: Male     Birth control/protection:  "I.U.D.     Comment: IUD on 2020       SURGICAL HISTORY   has a past surgical history that includes tonsillectomy; dental extraction(s);  delivery only (N/A, 2020); and primary c section.    CURRENT MEDICATIONS  Home Medications     Reviewed by Renea Brito R.N. (Registered Nurse) on 03/15/22 at 1619  Med List Status: <None>   Medication Last Dose Status   albuterol 108 (90 Base) MCG/ACT Aero Soln inhalation aerosol  Active   azithromycin (ZITHROMAX) 250 MG Tab  Active   predniSONE (DELTASONE) 10 MG Tab  Active                ALLERGIES  No Known Allergies    PHYSICAL EXAM  VITAL SIGNS: /67   Pulse 85   Temp 36.2 °C (97.1 °F) (Oral)   Resp 16   Ht 1.651 m (5' 5\")   Wt 83.6 kg (184 lb 4.9 oz)   LMP 2022 (Approximate)   SpO2 100%   BMI 30.67 kg/m²   Constitutional:  Alert and in no apparent distress.  HENT: Normocephalic, Bilateral external ears normal. Nose normal.   Eyes: Pupils are equal and reactive. Conjunctiva normal, non-icteric.   Cardiovascular:  Good Perfusion  Thorax & Lungs: Easy unlabored respirations  Abdomen:  No gross signs of peritonitis, no pain with movement   Pelvic Exam:   Deferred as patient had pelvic with gynecology today  Skin: Visualized skin is  Dry, No erythema, No rash.   Extremities:   Moves all extremities   Neurologic: Alert, Grossly non-focal.   Psychiatric: Appropriate Mood              COURSE & MEDICAL DECISION MAKING  Pertinent Labs & Imaging studies reviewed. (See chart for details)    The patient presents with first trimester vaginal bleeding. Differential includes threatened miscarriage, viable intrauterine pregnancy, ectopic pregnancy, or intravaginal bleeding.  Her workup revealed a an Rh negativestatus, quantitative beta-hCG level of 0.    No orders to display     Results for orders placed or performed during the hospital encounter of 03/15/22   CBC WITH DIFFERENTIAL   Result Value Ref Range    WBC 8.0 4.8 - 10.8 K/uL    RBC 4.57 4.20 - " 5.40 M/uL    Hemoglobin 14.3 12.0 - 16.0 g/dL    Hematocrit 40.3 37.0 - 47.0 %    MCV 88.2 81.4 - 97.8 fL    MCH 31.3 27.0 - 33.0 pg    MCHC 35.5 (H) 33.6 - 35.0 g/dL    RDW 41.4 35.9 - 50.0 fL    Platelet Count 230 164 - 446 K/uL    MPV 10.7 9.0 - 12.9 fL    Neutrophils-Polys 60.30 44.00 - 72.00 %    Lymphocytes 31.50 22.00 - 41.00 %    Monocytes 5.30 0.00 - 13.40 %    Eosinophils 1.90 0.00 - 6.90 %    Basophils 0.60 0.00 - 1.80 %    Immature Granulocytes 0.40 0.00 - 0.90 %    Nucleated RBC 0.00 /100 WBC    Neutrophils (Absolute) 4.82 2.00 - 7.15 K/uL    Lymphs (Absolute) 2.52 1.00 - 4.80 K/uL    Monos (Absolute) 0.42 0.00 - 0.85 K/uL    Eos (Absolute) 0.15 0.00 - 0.51 K/uL    Baso (Absolute) 0.05 0.00 - 0.12 K/uL    Immature Granulocytes (abs) 0.03 0.00 - 0.11 K/uL    NRBC (Absolute) 0.00 K/uL   COMP METABOLIC PANEL   Result Value Ref Range    Sodium 141 135 - 145 mmol/L    Potassium 3.4 (L) 3.6 - 5.5 mmol/L    Chloride 106 96 - 112 mmol/L    Co2 25 20 - 33 mmol/L    Anion Gap 10.0 7.0 - 16.0    Glucose 80 65 - 99 mg/dL    Bun 13 8 - 22 mg/dL    Creatinine 0.85 0.50 - 1.40 mg/dL    Calcium 9.4 8.5 - 10.5 mg/dL    AST(SGOT) 17 12 - 45 U/L    ALT(SGPT) 11 2 - 50 U/L    Alkaline Phosphatase 69 30 - 99 U/L    Total Bilirubin 0.4 0.1 - 1.5 mg/dL    Albumin 4.6 3.2 - 4.9 g/dL    Total Protein 7.5 6.0 - 8.2 g/dL    Globulin 2.9 1.9 - 3.5 g/dL    A-G Ratio 1.6 g/dL   LIPASE   Result Value Ref Range    Lipase 51 11 - 82 U/L   HCG QUANTITATIVE   Result Value Ref Range    Bhcg <1.0 0.0 - 5.0 mIU/mL   ESTIMATED GFR   Result Value Ref Range    GFR (CKD-EPI) 98 >60 mL/min/1.73 m 2   RH TYPE FOR RHOGAM FROM E.D.   Result Value Ref Range    Emergency Department Rh Typing NEG     Number Of Rh Doses Indicated 1        At this time I believe the patient has pelvic pain that may be related to her IUD or a cyst she already has an outpatient ultrasound pending and follow-up in 2 days with gynecology for IUD removal, there is no acute  emergent condition at this time she is not pregnant this does not represent represent an ectopic her pain is improved and she has follow-up with gynecology    FINAL IMPRESSION  1.  Pelvic pain  2.  IUD    Electronically signed by: Kaylynn Baig M.D., 3/15/2022 6:03 PM

## 2022-03-17 ENCOUNTER — TELEPHONE (OUTPATIENT)
Dept: OBGYN | Facility: CLINIC | Age: 22
End: 2022-03-17
Payer: MEDICAID

## 2022-03-17 DIAGNOSIS — B96.89 BV (BACTERIAL VAGINOSIS): Primary | ICD-10-CM

## 2022-03-17 DIAGNOSIS — N76.0 BV (BACTERIAL VAGINOSIS): Primary | ICD-10-CM

## 2022-03-17 RX ORDER — METRONIDAZOLE 7.5 MG/G
1 GEL VAGINAL
Qty: 5 EACH | Refills: 0 | Status: SHIPPED | OUTPATIENT
Start: 2022-03-17 | End: 2022-03-22

## 2022-03-17 NOTE — TELEPHONE ENCOUNTER
Called patient in regards to pap and was unable to reach. Left voicemail to call back.   ----- Message from JOSE BernardPChelseaRChelseaNChelsea sent at 3/17/2022  9:28 AM PDT -----  +BV > rx sent to pharmacy.  Pap wnl.

## 2022-03-18 ENCOUNTER — GYNECOLOGY VISIT (OUTPATIENT)
Dept: OBGYN | Facility: CLINIC | Age: 22
End: 2022-03-18
Payer: MEDICAID

## 2022-03-18 VITALS
HEIGHT: 65 IN | BODY MASS INDEX: 30.89 KG/M2 | SYSTOLIC BLOOD PRESSURE: 108 MMHG | WEIGHT: 185.4 LBS | DIASTOLIC BLOOD PRESSURE: 72 MMHG

## 2022-03-18 DIAGNOSIS — Z30.432 ENCOUNTER FOR IUD REMOVAL: ICD-10-CM

## 2022-03-18 DIAGNOSIS — N93.8 DUB (DYSFUNCTIONAL UTERINE BLEEDING): ICD-10-CM

## 2022-03-18 LAB
INT CON NEG: NEGATIVE
INT CON POS: POSITIVE
POC URINE PREGNANCY TEST: NORMAL

## 2022-03-18 PROCEDURE — 81025 URINE PREGNANCY TEST: CPT | Performed by: OBSTETRICS & GYNECOLOGY

## 2022-03-18 PROCEDURE — 99213 OFFICE O/P EST LOW 20 MIN: CPT | Performed by: OBSTETRICS & GYNECOLOGY

## 2022-03-18 ASSESSMENT — FIBROSIS 4 INDEX: FIB4 SCORE: 0.51

## 2022-03-18 NOTE — PROGRESS NOTES
GYN visit    CC/reason for visit: desire for pregnancy    HPI: Divine Elise is a 21 y.o.  with desire for pregnancy. Has IUD currently. She is not taking a PNV. Had  delivery for  labor at 25w6d- had LTCS with Dr. Cabrera.     ROS:  Reviewed and negative x 10 with pertinent positives listed in HPI above        GYN History:  Last pap 3/15/22- BV but pap WNL.  No h/o abnormal pap, nor history of cone biopsy, LEEP or any other cervical, uterine or gynecologic surgery. No history of sexually transmitted diseases.       OB history:    OB History    Para Term  AB Living   1 1   1   1   SAB IAB Ectopic Molar Multiple Live Births           0 1      # Outcome Date GA Lbr Noel/2nd Weight Sex Delivery Anes PTL Lv   1  20 25w6d  0.887 kg (1 lb 15.3 oz) M CS-LTranv Spinal Y LAURA       Past Medical History:   Diagnosis Date   • Rh negative status during pregnancy 2020       Past Surgical History:   Procedure Laterality Date   • [place]  DELIVERY ONLY N/A 2020    Procedure:  SECTION, PRIMARY;  Surgeon: Marisol Cabrera D.O.;  Location: LABOR AND DELIVERY;  Service: Obstetrics   • DENTAL EXTRACTION(S)     • PRIMARY C SECTION     • TONSILLECTOMY         Medications:   Current Outpatient Medications Ordered in Epic   Medication Sig Dispense Refill   • metroNIDAZOLE (METROGEL-VAGINAL) 0.75 % Gel Insert 1 Applicator into the vagina at bedtime for 5 days. 5 Each 0   • predniSONE (DELTASONE) 10 MG Tab 50 mg x 1 day; 40 mg x 1 day; 30 mg x 1 day; 20 mg x 1 day; 10 mg x 1 day 15 Tablet 0   • azithromycin (ZITHROMAX) 250 MG Tab ANTIBIOTIC Use as package directs 6 Tablet 0   • albuterol 108 (90 Base) MCG/ACT Aero Soln inhalation aerosol Inhale 2 Puffs every 6 hours as needed. 8.5 g 1     No current [facility]-ordered facility-administered medications on file.       Allergies: Patient has no known allergies.    Social History     Socioeconomic History   • Marital status: Single  "  Tobacco Use   • Smoking status: Never Smoker   • Smokeless tobacco: Never Used   Vaping Use   • Vaping Use: Never used   Substance and Sexual Activity   • Alcohol use: Never   • Drug use: Yes     Types: Marijuana, Inhaled     Comment: Marijuana   • Sexual activity: Yes     Partners: Male     Birth control/protection: I.U.D.     Comment: IUD on 2020       Family History   Problem Relation Age of Onset   • Autism Brother    • Seizures Brother    • Cancer Maternal Aunt         Breast   • Diabetes Maternal Grandfather          Physical Exam:  /72 (BP Location: Left arm, Patient Position: Sitting, BP Cuff Size: Adult)   Ht 1.651 m (5' 5\")   Wt 84.1 kg (185 lb 6.4 oz)   LMP 2022 (Approximate)   BMI 30.85 kg/m²   gen: AAO, NAD, affect appropriate  CV: No edema, cyanosis, or clubbing  Resp: Symmetric non labored breathing  Abd: soft, NT, ND, no masses, no organomegaly, no hernias  : NEFG, normal urethral meatus, normal anus/perineum, normal vagina and cervix.  Uterus midline, anteverted, no adnexal masses/tenderness  Skin: warm/dry, no lesions    A/P: 21 y.o.  with   1. DUB (dysfunctional uterine bleeding)  POCT Pregnancy       "

## 2022-03-21 ENCOUNTER — TELEPHONE (OUTPATIENT)
Dept: OBGYN | Facility: CLINIC | Age: 22
End: 2022-03-21
Payer: MEDICAID

## 2022-03-21 NOTE — TELEPHONE ENCOUNTER
----- Message from JOSE BernardPChelseaRChelseaNChelsea sent at 3/17/2022  9:28 AM PDT -----  +BV > rx sent to pharmacy.  Pap wnl.  3/21/22@ 8:36am. Patient was notified as above.

## 2022-07-12 ENCOUNTER — GYNECOLOGY VISIT (OUTPATIENT)
Dept: OBGYN | Facility: CLINIC | Age: 22
End: 2022-07-12
Payer: MEDICAID

## 2022-07-12 VITALS — WEIGHT: 192 LBS | SYSTOLIC BLOOD PRESSURE: 116 MMHG | DIASTOLIC BLOOD PRESSURE: 70 MMHG | BODY MASS INDEX: 31.95 KG/M2

## 2022-07-12 DIAGNOSIS — N93.8 DUB (DYSFUNCTIONAL UTERINE BLEEDING): ICD-10-CM

## 2022-07-12 LAB
INT CON NEG: NORMAL
INT CON POS: NORMAL
POC URINE PREGNANCY TEST: POSITIVE

## 2022-07-12 PROCEDURE — 76830 TRANSVAGINAL US NON-OB: CPT | Performed by: OBSTETRICS & GYNECOLOGY

## 2022-07-12 PROCEDURE — 81025 URINE PREGNANCY TEST: CPT | Performed by: OBSTETRICS & GYNECOLOGY

## 2022-07-12 PROCEDURE — 99213 OFFICE O/P EST LOW 20 MIN: CPT | Mod: 25 | Performed by: OBSTETRICS & GYNECOLOGY

## 2022-07-12 RX ORDER — DOXYLAMINE SUCCINATE AND PYRIDOXINE HYDROCHLORIDE, DELAYED RELEASE TABLETS 10 MG/10 MG 10; 10 MG/1; MG/1
2 TABLET, DELAYED RELEASE ORAL
Qty: 60 TABLET | Refills: 2 | Status: ON HOLD | OUTPATIENT
Start: 2022-07-12 | End: 2023-02-01

## 2022-07-12 RX ORDER — ACETAMINOPHEN 500 MG
500-1000 TABLET ORAL EVERY 6 HOURS PRN
COMMUNITY
End: 2023-01-05

## 2022-07-12 ASSESSMENT — FIBROSIS 4 INDEX: FIB4 SCORE: 0.51

## 2022-07-12 NOTE — PROGRESS NOTES
21 y.o.  Unknown By LMP Patient's last menstrual period was 2022 (approximate).  here for confirmation of pregnancy. Pt has not gotten any PNC to date. She denies any Vb/cramping, reports a lot of  Nausea and vomittiny. This is a planned pregnancy. Pt is happy to continue this pregnancy. Pt is taking a PNV no DHA. She is overall well, without complaints. Last pap smear was 2022 Normal.     COVID vaccine: No  Flu vaccine: No    I have reviewed the patients'  medical, obstetrical,social, and family histories, medications and available lab results.    No pain, bleeding, unusual discharge or leeking              Allergies: Patient has no known allergies.    Past Medical History:   Diagnosis Date   • Rh negative status during pregnancy 2020     Past Surgical History:   Procedure Laterality Date   • [place]  DELIVERY ONLY N/A 2020    Procedure:  SECTION, PRIMARY;  Surgeon: Marisol Cabrera D.O.;  Location: LABOR AND DELIVERY;  Service: Obstetrics   • DENTAL EXTRACTION(S)     • PRIMARY C SECTION     • TONSILLECTOMY         Objective:/70   Wt 87.1 kg (192 lb)      HEENT:atraumatic  normocephalic  Abdomen:Soft, nontender, no hernias, masses, or organomegaly  Extremities:Normal    Transvaginal Ultrasound performed and read by myself:  Nml appearing uterus and nml ovaries    First trimester findings: Intrauterine gestational sac seen: yes  Fetal cardiac activity: present  Crown-rump length: 2.81 cm, consistent with 9 weeks and 4 days    Assessment: 22 yo @ 10w1d by LMP c/w today's 9 wk US EDC 2023  doing well, H/o PTD and CS @ 25 weeks     Plan:  -Pt encouraged to continue PNV+DHA  -SAB precautions reviewed  -1st tri prenatal labs to be ordered at initial OB appt  -Encouraged COVID and flu vaccines  -Discussed dos and don'ts of pregnancy  -RTC in 2 weeks for initial OB appt.

## 2022-07-12 NOTE — PROGRESS NOTES
Patient here for GYN/DUB.  UPT= Positive in clinic today   LMP= 05/02/2022  RHODA= 02/06/2023  GA= 10w1d  Last pap= 03/15/2022 WNL  Phone number: [phone]   Pharmacy verified  c/o  Nausea and vomitting

## 2022-08-12 ENCOUNTER — INITIAL PRENATAL (OUTPATIENT)
Dept: OBGYN | Facility: CLINIC | Age: 22
End: 2022-08-12
Payer: MEDICAID

## 2022-08-12 VITALS
WEIGHT: 188.4 LBS | SYSTOLIC BLOOD PRESSURE: 118 MMHG | HEIGHT: 65 IN | BODY MASS INDEX: 31.39 KG/M2 | DIASTOLIC BLOOD PRESSURE: 74 MMHG

## 2022-08-12 DIAGNOSIS — Z98.891 HISTORY OF CESAREAN DELIVERY: ICD-10-CM

## 2022-08-12 DIAGNOSIS — O09.892 HISTORY OF PRETERM DELIVERY, CURRENTLY PREGNANT IN SECOND TRIMESTER: ICD-10-CM

## 2022-08-12 DIAGNOSIS — O09.892 SUPERVISION OF OTHER HIGH RISK PREGNANCIES, SECOND TRIMESTER: Primary | ICD-10-CM

## 2022-08-12 PROBLEM — Z32.01 POSITIVE PREGNANCY TEST: Status: RESOLVED | Noted: 2022-03-15 | Resolved: 2022-08-12

## 2022-08-12 PROBLEM — T83.31XA IUD PREGNANCY: Status: RESOLVED | Noted: 2022-03-15 | Resolved: 2022-08-12

## 2022-08-12 PROBLEM — Z33.1 IUD PREGNANCY: Status: RESOLVED | Noted: 2022-03-15 | Resolved: 2022-08-12

## 2022-08-12 PROCEDURE — 0500F INITIAL PRENATAL CARE VISIT: CPT | Performed by: NURSE PRACTITIONER

## 2022-08-12 ASSESSMENT — ENCOUNTER SYMPTOMS
CARDIOVASCULAR NEGATIVE: 1
MUSCULOSKELETAL NEGATIVE: 1
CONSTITUTIONAL NEGATIVE: 1
GASTROINTESTINAL NEGATIVE: 1
PSYCHIATRIC NEGATIVE: 1
NEUROLOGICAL NEGATIVE: 1
RESPIRATORY NEGATIVE: 1
EYES NEGATIVE: 1

## 2022-08-12 ASSESSMENT — EDINBURGH POSTNATAL DEPRESSION SCALE (EPDS)
I HAVE FELT SCARED OR PANICKY FOR NO GOOD REASON: NO, NOT MUCH
THE THOUGHT OF HARMING MYSELF HAS OCCURRED TO ME: NEVER
I HAVE BLAMED MYSELF UNNECESSARILY WHEN THINGS WENT WRONG: YES, SOME OF THE TIME
I HAVE BEEN ANXIOUS OR WORRIED FOR NO GOOD REASON: YES, SOMETIMES
I HAVE FELT SAD OR MISERABLE: NOT VERY OFTEN
I HAVE BEEN ABLE TO LAUGH AND SEE THE FUNNY SIDE OF THINGS: AS MUCH AS I ALWAYS COULD
I HAVE LOOKED FORWARD WITH ENJOYMENT TO THINGS: AS MUCH AS I EVER DID
THINGS HAVE BEEN GETTING ON TOP OF ME: NO, MOST OF THE TIME I HAVE COPED QUITE WELL
I HAVE BEEN SO UNHAPPY THAT I HAVE BEEN CRYING: ONLY OCCASIONALLY
TOTAL SCORE: 8
I HAVE BEEN SO UNHAPPY THAT I HAVE HAD DIFFICULTY SLEEPING: NOT AT ALL

## 2022-08-12 ASSESSMENT — FIBROSIS 4 INDEX: FIB4 SCORE: 0.51

## 2022-08-12 NOTE — PROGRESS NOTES
S:  Divine Elise is a 21 y.o.  who presents for her new OB exam.  She is 14w4d with and RHODA of Estimated Date of Delivery: 23 based off of LMP , c/w US. She has no complaints.  She is currently not working. Discussed heavy lifting and chemical exposure. No ER visits or previous care in this pregnancy.     Declines AFP. See [facility] every 2 weeks, and has had genetic counseling with them.  Declines CF.  Denies VB, LOF, or cramping.  Denies dysuria, vaginal DC. Reports no fetal movement.     Pt is single and lives with boyfriend and child. Same FOB as first child.  Pregnancy is planned and desired.  She has a history of 1   delivery of a 25w6d day infant after she went into spontaneous active labor.    Discussed diet and exercise during pregnancy. Encouraged good nutrition, and daily exercise including walking or swimming. Discussed expected weight gain during pregnancy. Discussed adequate hydration during pregnancy.    Past Medical History:   Diagnosis Date    Head ache     Rh negative status during pregnancy 2020     Family History   Problem Relation Age of Onset    No Known Problems Mother     No Known Problems Father     Autism Brother     Seizures Brother     Cancer Maternal Aunt         Breast    Diabetes Maternal Grandfather      Social History     Socioeconomic History    Marital status: Single     Spouse name: Not on file    Number of children: Not on file    Years of education: Not on file    Highest education level: Not on file   Occupational History    Not on file   Tobacco Use    Smoking status: Never    Smokeless tobacco: Never   Vaping Use    Vaping Use: Never used   Substance and Sexual Activity    Alcohol use: Never    Drug use: Yes     Types: Marijuana, Inhaled     Comment: Marijuana    Sexual activity: Yes     Partners: Male     Birth control/protection: I.U.D.     Comment: IUD on 2020   Other Topics Concern    Not on file   Social History Narrative    Not on  "file     Social Determinants of Health     Financial Resource Strain: Not on file   Food Insecurity: Not on file   Transportation Needs: Not on file   Physical Activity: Not on file   Stress: Not on file   Social Connections: Not on file   Intimate Partner Violence: Not on file   Housing Stability: Not on file     OB History    Para Term  AB Living   2 1   1   1   SAB IAB Ectopic Molar Multiple Live Births           0 1      # Outcome Date GA Lbr Noel/2nd Weight Sex Delivery Anes PTL Lv   2 Current            1  20 25w6d  0.887 kg (1 lb 15.3 oz) M CS-LTranv Spinal Y LAURA       History of Varicella Virus: no  History of HSV I or II in self or partner: no  History of Thyroid problems: no    O:  /74   Ht 1.651 m (5' 5\")   Wt 85.5 kg (188 lb 6.4 oz)    See Prenatal Physical.    Wet mount: deferred, no s/sx  Chaperone offered: yes    A:   1.  IUP @ 14w4d per LMP        2.  S=D        3.  See problem list below               Patient Active Problem List    Diagnosis Date Noted    Supervision of other high risk pregnancies, second trimester 2022    History of  delivery, currently pregnant in second trimester 2022    Vaginal discharge 03/15/2022    LGSIL of cervix of undetermined significance 03/15/2022    LGSIL on Pap smear of cervix - f/u PAP 1 yr 2020         P:  1.  GC/CT ordered through [facility]. Last pap was 2022 and WNL        2.  Prenatal labs ordered - lab slip given        3.  Discussed PNV, diet, avoidances and adequate water intake        4.  NOB packet given        5.  Return to office in 4 wks        6.  Complete OB US to be done with [facility]        7.  AFP to be discussed next visit    Orders Placed This Encounter    PREG CNTR PRENATAL PN    Chlamydia/GC, PCR (Urine)    URINALYSIS,CULTURE IF INDICATED    URINE DRUG SCREEN W/CONF (AR)    HEP C VIRUS ANTIBODY        HPI    Review of Systems   Constitutional: Negative.    HENT: Negative.     Eyes: Negative.  " "  Respiratory: Negative.     Cardiovascular: Negative.    Gastrointestinal: Negative.    Genitourinary: Negative.    Musculoskeletal: Negative.    Skin: Negative.    Neurological: Negative.    Endo/Heme/Allergies: Negative.    Psychiatric/Behavioral: Negative.     All other systems reviewed and are negative.        Objective     /74   Ht 1.651 m (5' 5\")   Wt 85.5 kg (188 lb 6.4 oz)   LMP 2022 (Approximate)   BMI 31.35 kg/m²      Physical Exam  Vitals and nursing note reviewed. Exam conducted with a chaperone present.   Constitutional:       Appearance: Normal appearance. She is normal weight.   HENT:      Head: Normocephalic.      Nose: Nose normal.   Eyes:      Extraocular Movements: Extraocular movements intact.   Cardiovascular:      Rate and Rhythm: Normal rate and regular rhythm.      Pulses: Normal pulses.      Heart sounds: Normal heart sounds.   Pulmonary:      Effort: Pulmonary effort is normal.      Breath sounds: Normal breath sounds.   Abdominal:      Palpations: Abdomen is soft.   Musculoskeletal:         General: Normal range of motion.      Cervical back: Normal range of motion and neck supple.   Skin:     General: Skin is warm and dry.   Neurological:      General: No focal deficit present.      Mental Status: She is alert and oriented to person, place, and time.   Psychiatric:         Mood and Affect: Mood normal.         Behavior: Behavior normal.         Thought Content: Thought content normal.         Judgment: Judgment normal.           Assessment & Plan     1. Supervision of other high risk pregnancies, second trimester  RHODA 2023 per LMP  - PREG CNTR PRENATAL PN; Future  - Chlamydia/GC, PCR (Urine); Future  - URINALYSIS,CULTURE IF INDICATED; Future  - URINE DRUG SCREEN W/CONF (AR); Future  - HEP C VIRUS ANTIBODY; Future    2. History of  delivery, currently pregnant in second trimester  Followed by [facility] already           "

## 2022-08-12 NOTE — PROGRESS NOTES
Pt. Here for NOB visit.  Pt had a DUB on 7/12/2022  LMP 5/2/2022  Last pap smear 3/15/2022 WNL  Pt. States having some cramping.   Pt states last saw [facility] 8/4/2022 and states has follow ups every 2 weeks.

## 2022-10-07 NOTE — CARE PLAN
Problem: Safety  Goal: Will remain free from falls  Outcome: PROGRESSING AS EXPECTED  Intervention: Assess risk factors for falls  Note: Pt mobility assessed. Pt able to be up self, steady on feet with steady gait. Pt educated to fall risk, calls for assistance appropriately.     Problem: Potential anxiety related to difficulty adapting to parental role  Goal: Patient will verbalize and demonstrate effective bonding and parenting behavior  Outcome: PROGRESSING AS EXPECTED  Intervention: Provide emotional support and encouragement to patient and family  Note: Provided emotional support and encouragement.     Problem: Urinary Elimination:  Goal: Ability to reestablish a normal urinary elimination pattern will improve  Outcome: MET      Instructed to call immediately if any new distortion, blurring, decreased vision or eye pain.

## 2022-12-08 ENCOUNTER — ROUTINE PRENATAL (OUTPATIENT)
Dept: OBGYN | Facility: CLINIC | Age: 22
End: 2022-12-08
Payer: MEDICAID

## 2022-12-08 VITALS — DIASTOLIC BLOOD PRESSURE: 62 MMHG | BODY MASS INDEX: 34.45 KG/M2 | WEIGHT: 207 LBS | SYSTOLIC BLOOD PRESSURE: 117 MMHG

## 2022-12-08 DIAGNOSIS — Z67.91 RH NEGATIVE STATE IN ANTEPARTUM PERIOD: ICD-10-CM

## 2022-12-08 DIAGNOSIS — O09.893 SUPERVISION OF OTHER HIGH RISK PREGNANCIES, THIRD TRIMESTER: Primary | ICD-10-CM

## 2022-12-08 DIAGNOSIS — O26.899 RH NEGATIVE STATE IN ANTEPARTUM PERIOD: ICD-10-CM

## 2022-12-08 PROCEDURE — 90715 TDAP VACCINE 7 YRS/> IM: CPT | Performed by: NURSE PRACTITIONER

## 2022-12-08 PROCEDURE — 90471 IMMUNIZATION ADMIN: CPT | Performed by: NURSE PRACTITIONER

## 2022-12-08 PROCEDURE — 96372 THER/PROPH/DIAG INJ SC/IM: CPT | Performed by: NURSE PRACTITIONER

## 2022-12-08 PROCEDURE — 90472 IMMUNIZATION ADMIN EACH ADD: CPT | Performed by: NURSE PRACTITIONER

## 2022-12-08 PROCEDURE — 0502F SUBSEQUENT PRENATAL CARE: CPT | Performed by: NURSE PRACTITIONER

## 2022-12-08 PROCEDURE — 90686 IIV4 VACC NO PRSV 0.5 ML IM: CPT | Performed by: NURSE PRACTITIONER

## 2022-12-08 RX ORDER — ASPIRIN 81 MG/1
81 TABLET, CHEWABLE ORAL DAILY
COMMUNITY
End: 2023-02-16

## 2022-12-08 ASSESSMENT — FIBROSIS 4 INDEX: FIB4 SCORE: 0.53

## 2022-12-08 NOTE — LETTER
"Count Your Baby's Movements  Another step to a healthy delivery    A Epic Dress Re Test             Dept: [phone]    How Many Weeks Pregnant? 31 weeks 3 days    Date to Begin Counting: Today              How to use this chart    One way for your physician to keep track of your baby's health is by knowing how often the baby moves (or \"kicks\") in your womb.  You can help your physician to do this by using this chart every day.    Every day, you should see how many hours it takes for your baby to move 10 times.  Start in the morning, as soon as you get up.    · First, write down the time your baby moves until you get to 10.  · Check off one box every time your baby moves until you get to 10.  · Write down the time you finished counting in the last column.  · Total how long it took to count up all 10 movements.  · Finally, fill in the box that shows how long this took.  After counting 10 movements, you no longer have to count any more that day.  The next morning, just start counting again as soon as you get up.    What should you call a \"movement\"?  It is hard to say, because it will feel different from one mother to another and from one pregnancy to the next.  The important thing is that you count the movements the same way throughout your pregnancy.  If you have more questions, you should ask your physician.    Count carefully every day!  SAMPLE:  Week 28    How many hours did it take to feel 10 movements?       Start  Time     1     2     3     4     5     6     7     8     9     10   Finish Time   Mon 8:20 ·  ·  ·  ·  ·  ·  ·  ·  ·  ·  11:40   Tue Wed Thu Fri               Sat               Sun                 IMPORTANT: You should contact your physician if it takes more than two hours for you to feel 10 movements.  Each morning, write down the time and start to count the movements of your baby.  Keep track by checking off one box every time you feel one movement.  When " "you have felt 10 \"kicks\", write down the time you finished counting in the last column.  Then fill in the   box (over the check alejandra) for the number of hours it took.  Be sure to read the complete instructions on the previous page.            "

## 2022-12-08 NOTE — PROGRESS NOTES
Pt here for OB follow up.   FM+  HRPC last seen on 11/10 , next visit on 12/29  Preferred pharmacy verified.  Good Phone # [phone]  Pt states varicose veins on left leg, concerned and states feeling like she will pass out if pt stands up.   Contractions, LOF, VB denied.  Tdap offered and desired.   Flu offered and desired.   ROBINSON sheet given to pt.   Rhogam today.

## 2022-12-22 ENCOUNTER — HOSPITAL ENCOUNTER (OUTPATIENT)
Dept: LAB | Facility: MEDICAL CENTER | Age: 22
End: 2022-12-22
Attending: NURSE PRACTITIONER
Payer: MEDICAID

## 2022-12-22 DIAGNOSIS — O09.893 SUPERVISION OF OTHER HIGH RISK PREGNANCIES, THIRD TRIMESTER: ICD-10-CM

## 2022-12-22 PROCEDURE — 82950 GLUCOSE TEST: CPT

## 2022-12-22 PROCEDURE — 36415 COLL VENOUS BLD VENIPUNCTURE: CPT

## 2022-12-23 LAB — GLUCOSE 1H P 50 G GLC PO SERPL-MCNC: 88 MG/DL (ref 70–139)

## 2023-01-04 NOTE — PROGRESS NOTES
S:   Divine is a 22 y.o.  at 35w3d. Her RHODA is 2023, by Last Menstrual Period. She is here with her partner and son for routine OB follow up.  Reports positive FM.  Denies VB, LOF, RUCs or vaginal DC. Reports discontinuing progesterone at 35 weeks.    Current Outpatient Medications on File Prior to Visit   Medication Sig Dispense Refill    aspirin (ASA) 81 MG Chew Tab chewable tablet Chew 81 mg every day.      Prenatal MV-Min-Fe Fum-FA-DHA (PRENATAL 1 PO) Take  by mouth.      Doxylamine-Pyridoxine 10-10 MG Tablet Delayed Response delayed-release tablet Take 2 Tablets by mouth at bedtime. (Patient not taking: Reported on 2022) 60 Tablet 2    albuterol 108 (90 Base) MCG/ACT Aero Soln inhalation aerosol Inhale 2 Puffs every 6 hours as needed. (Patient not taking: Reported on 2022) 8.5 g 1     No current facility-administered medications on file prior to visit.       O:    Vitals:    23 1102   BP: 108/60   Weight: 210 lb       FH: 35 cm  FHT: 150 BPM  Presentation: cephalic by Leopolds    See flow sheet.    A:   IUP 35w3d  S=D  Patient Active Problem List    Diagnosis Date Noted    Rh negative state in antepartum period 2022    Supervision of other high risk pregnancies, third trimester 2022    History of  delivery, currently pregnant in second trimester 2022    History of  delivery 2022    Vaginal discharge 03/15/2022    LGSIL of cervix of undetermined significance 03/15/2022    LGSIL on Pap smear of cervix - f/u PAP 1 yr 2020       P:    GBS next visit.   Continue FKCs.    Questions answered. Anticipatory guidance.   Encourage adequate water intake.  Discontinue vaginal progesterone between 36-37 weeks per MFM - pt reports discontinuing progesterone at 35 weeks  PP Contraception: declines  History of C/S, desires TOLAC  No more HRPC f/u appointments per pt   Counseled on importance of labwork in pregnancy - encouraged to get labs drawn ASAP -  verbalized understanding- states she will go get drawn today  Reviewed  labor and return precautions - patient verbalized understanding.  Next BREA visit to be scheduled with MD for TOLAC counseling - pt aware - note made to schedulers.  F/u 1 wks and PRN    No orders of the defined types were placed in this encounter.      Pregnancy Checklist  Prenatal labs: have not been collected as of 23  EPDS: not completed  Last Pap: 3/15/22: NILM  Quad screen/NIPT/MASFP/Carrier Screen: declines  Anatomy US: being followed by [facility], see documents in media  3rd trimester labs: have not been collected as of 23, except 1 hour GTT WNL  Tdap: administered 22  Flu vaccine: administered 22  Rhogam: administered 22  PP Contraception plan: declines PP birth control  Bilateral salpingectomy: declines 22  GBS:   Hx C/S: yes  Desires TOLAC?: yes - to be scheduled with MD for TOLAC counseling  IOL plan:    Anette Parkinson C.N.M.

## 2023-01-05 ENCOUNTER — HOSPITAL ENCOUNTER (OUTPATIENT)
Dept: LAB | Facility: MEDICAL CENTER | Age: 23
End: 2023-01-05
Attending: NURSE PRACTITIONER
Payer: MEDICAID

## 2023-01-05 ENCOUNTER — ROUTINE PRENATAL (OUTPATIENT)
Dept: OBGYN | Facility: CLINIC | Age: 23
End: 2023-01-05
Payer: MEDICAID

## 2023-01-05 VITALS — DIASTOLIC BLOOD PRESSURE: 60 MMHG | SYSTOLIC BLOOD PRESSURE: 108 MMHG | BODY MASS INDEX: 34.95 KG/M2 | WEIGHT: 210 LBS

## 2023-01-05 DIAGNOSIS — O09.93 SUPERVISION OF HIGH RISK PREGNANCY IN THIRD TRIMESTER: ICD-10-CM

## 2023-01-05 DIAGNOSIS — O09.892 SUPERVISION OF OTHER HIGH RISK PREGNANCIES, SECOND TRIMESTER: ICD-10-CM

## 2023-01-05 DIAGNOSIS — O09.892 HISTORY OF PRETERM DELIVERY, CURRENTLY PREGNANT IN SECOND TRIMESTER: ICD-10-CM

## 2023-01-05 LAB
ABO GROUP BLD: ABNORMAL
APPEARANCE UR: CLEAR
BACTERIA #/AREA URNS HPF: ABNORMAL /HPF
BILIRUB UR QL STRIP.AUTO: NEGATIVE
BLD GP AB INVEST PLASRBC-IMP: ABNORMAL
BLD GP AB SCN SERPL QL: ABNORMAL
COLOR UR: YELLOW
ERYTHROCYTE [DISTWIDTH] IN BLOOD BY AUTOMATED COUNT: 44.1 FL (ref 35.9–50)
GLUCOSE UR STRIP.AUTO-MCNC: NEGATIVE MG/DL
HBV SURFACE AG SER QL: ABNORMAL
HCT VFR BLD AUTO: 36.1 % (ref 37–47)
HCV AB SER QL: ABNORMAL
HGB BLD-MCNC: 12.6 G/DL (ref 12–16)
HIV 1+2 AB+HIV1 P24 AG SERPL QL IA: NORMAL
KETONES UR STRIP.AUTO-MCNC: NEGATIVE MG/DL
LEUKOCYTE ESTERASE UR QL STRIP.AUTO: ABNORMAL
MCH RBC QN AUTO: 31.8 PG (ref 27–33)
MCHC RBC AUTO-ENTMCNC: 34.9 G/DL (ref 33.6–35)
MCV RBC AUTO: 91.2 FL (ref 81.4–97.8)
MICRO URNS: ABNORMAL
NITRITE UR QL STRIP.AUTO: NEGATIVE
PH UR STRIP.AUTO: 7 [PH] (ref 5–8)
PLATELET # BLD AUTO: 198 K/UL (ref 164–446)
PMV BLD AUTO: 10.9 FL (ref 9–12.9)
PROT UR QL STRIP: NEGATIVE MG/DL
RBC # BLD AUTO: 3.96 M/UL (ref 4.2–5.4)
RBC # URNS HPF: ABNORMAL /HPF
RBC UR QL AUTO: ABNORMAL
RH BLD: ABNORMAL
RUBV AB SER QL: 12.7 IU/ML
SP GR UR STRIP.AUTO: 1.01
T PALLIDUM AB SER QL IA: ABNORMAL
UROBILINOGEN UR STRIP.AUTO-MCNC: 0.2 MG/DL
WBC # BLD AUTO: 12.6 K/UL (ref 4.8–10.8)
WBC #/AREA URNS HPF: ABNORMAL /HPF

## 2023-01-05 PROCEDURE — 87086 URINE CULTURE/COLONY COUNT: CPT

## 2023-01-05 PROCEDURE — 87077 CULTURE AEROBIC IDENTIFY: CPT

## 2023-01-05 PROCEDURE — 86803 HEPATITIS C AB TEST: CPT

## 2023-01-05 PROCEDURE — 86901 BLOOD TYPING SEROLOGIC RH(D): CPT

## 2023-01-05 PROCEDURE — 87340 HEPATITIS B SURFACE AG IA: CPT

## 2023-01-05 PROCEDURE — 81001 URINALYSIS AUTO W/SCOPE: CPT

## 2023-01-05 PROCEDURE — 85027 COMPLETE CBC AUTOMATED: CPT

## 2023-01-05 PROCEDURE — 86850 RBC ANTIBODY SCREEN: CPT

## 2023-01-05 PROCEDURE — 86780 TREPONEMA PALLIDUM: CPT

## 2023-01-05 PROCEDURE — 86870 RBC ANTIBODY IDENTIFICATION: CPT

## 2023-01-05 PROCEDURE — 86900 BLOOD TYPING SEROLOGIC ABO: CPT

## 2023-01-05 PROCEDURE — 36415 COLL VENOUS BLD VENIPUNCTURE: CPT

## 2023-01-05 PROCEDURE — 0502F SUBSEQUENT PRENATAL CARE: CPT

## 2023-01-05 PROCEDURE — 86762 RUBELLA ANTIBODY: CPT

## 2023-01-05 PROCEDURE — 87389 HIV-1 AG W/HIV-1&-2 AB AG IA: CPT

## 2023-01-05 ASSESSMENT — FIBROSIS 4 INDEX: FIB4 SCORE: 0.53

## 2023-01-05 NOTE — PROGRESS NOTES
OB follow up   + fetal movement.  No VB, LOF or UC's.  Phone # [phone]  Preferred pharmacy confirmed.  PNP not done yet, will do today

## 2023-01-07 LAB
BACTERIA UR CULT: ABNORMAL
BACTERIA UR CULT: ABNORMAL
SIGNIFICANT IND 70042: ABNORMAL
SITE SITE: ABNORMAL
SOURCE SOURCE: ABNORMAL

## 2023-01-10 DIAGNOSIS — O23.43 URINARY TRACT INFECTION IN MOTHER DURING THIRD TRIMESTER OF PREGNANCY: ICD-10-CM

## 2023-01-10 RX ORDER — NITROFURANTOIN 25; 75 MG/1; MG/1
100 CAPSULE ORAL 2 TIMES DAILY
Qty: 14 CAPSULE | Refills: 0 | Status: SHIPPED | OUTPATIENT
Start: 2023-01-10 | End: 2023-01-17

## 2023-01-13 ENCOUNTER — HOSPITAL ENCOUNTER (OUTPATIENT)
Facility: MEDICAL CENTER | Age: 23
End: 2023-01-13
Attending: OBSTETRICS & GYNECOLOGY
Payer: MEDICAID

## 2023-01-13 ENCOUNTER — ROUTINE PRENATAL (OUTPATIENT)
Dept: OBGYN | Facility: CLINIC | Age: 23
End: 2023-01-13
Payer: MEDICAID

## 2023-01-13 VITALS — DIASTOLIC BLOOD PRESSURE: 58 MMHG | BODY MASS INDEX: 35.94 KG/M2 | WEIGHT: 216 LBS | SYSTOLIC BLOOD PRESSURE: 106 MMHG

## 2023-01-13 DIAGNOSIS — Z87.51 HISTORY OF PRETERM DELIVERY: ICD-10-CM

## 2023-01-13 DIAGNOSIS — Z98.891 HISTORY OF C-SECTION: ICD-10-CM

## 2023-01-13 DIAGNOSIS — Z3A.36 PREGNANCY WITH 36 COMPLETED WEEKS GESTATION: ICD-10-CM

## 2023-01-13 PROCEDURE — 87150 DNA/RNA AMPLIFIED PROBE: CPT

## 2023-01-13 PROCEDURE — 87081 CULTURE SCREEN ONLY: CPT

## 2023-01-13 PROCEDURE — 0502F SUBSEQUENT PRENATAL CARE: CPT | Performed by: OBSTETRICS & GYNECOLOGY

## 2023-01-13 ASSESSMENT — FIBROSIS 4 INDEX: FIB4 SCORE: 0.62

## 2023-01-13 NOTE — PROGRESS NOTES
OB Visit Note - 36w4d     MEDICAL DECISION MAKING:  Divine Elise is a 22 y.o. female  at 36w4d who presents for prenatal visit and TOLAC counseling. She has a history of a 25 week delivery due to PTL and footling breech presentation. Per review of the op report, it was a low transverse  section with a 2 cm cephalad extension. This was discussed with the patient. Discussed that the average risk of uterine rupture is 1% after one prior LTCS, discussed that it is 10% with one prior classical. Based on the op report, it states that the surgeon felt that she was still a candidate for a TOLAC. Discussed with her that her risk of uterine rupture is probably more than 1% given the T of the incision, but we can't know for sure. Her 25 week baby is now almost two and doing well, but had a long NICU stay. She and her  are concerned with doing what is safest for the baby and elected to have a scheduled repeat .     She reports no concerns today. She is feeling the baby move, denies LOF/VB, and is not having contractions. GBS performed today. Labor precautions given. Request for scheduled  sent     Today's visit addressed:   1. Routine prenatal care  2. GBS collection  3. TOLAC counseling     Pregnancy complicated by:  Patient Active Problem List   Diagnosis    LGSIL on Pap smear of cervix - f/u PAP 1 yr    Vaginal discharge    LGSIL of cervix of undetermined significance    Supervision of high risk pregnancy in third trimester    History of  delivery - desires TOLAC    Rh negative state in antepartum period       The patient will follow up in 1 week(s). She was counseled to call or return for vaginal bleeding, regular contractions, leakage of fluid or decreased fetal movement.    Ansley Davis M.D.

## 2023-01-13 NOTE — PROGRESS NOTES
OB follow up   + fetal movement.  No VB, LOF or UC's.  Phone # [phone] (home)   Preferred pharmacy confirmed.  GBS today!

## 2023-01-15 LAB — GP B STREP DNA SPEC QL NAA+PROBE: POSITIVE

## 2023-01-20 ENCOUNTER — ROUTINE PRENATAL (OUTPATIENT)
Dept: OBGYN | Facility: CLINIC | Age: 23
End: 2023-01-20
Payer: MEDICAID

## 2023-01-20 VITALS — SYSTOLIC BLOOD PRESSURE: 116 MMHG | DIASTOLIC BLOOD PRESSURE: 68 MMHG | WEIGHT: 217 LBS | BODY MASS INDEX: 36.11 KG/M2

## 2023-01-20 DIAGNOSIS — Z34.83 ENCOUNTER FOR SUPERVISION OF OTHER NORMAL PREGNANCY IN THIRD TRIMESTER: ICD-10-CM

## 2023-01-20 PROCEDURE — 0502F SUBSEQUENT PRENATAL CARE: CPT

## 2023-01-20 ASSESSMENT — FIBROSIS 4 INDEX: FIB4 SCORE: 0.62

## 2023-01-20 NOTE — PROGRESS NOTES
S:   Divine is a 22 y.o.  at 37w4d. Her RHODA is 2023, by Last Menstrual Period. She is here for routine OB follow up.  Reports positive FM.  Denies VB, LOF, RUCs or vaginal DC. No concerns today. Currently taking nitrofurantoin for UTI - states sx are improving.    Current Outpatient Medications on File Prior to Visit   Medication Sig Dispense Refill    aspirin (ASA) 81 MG Chew Tab chewable tablet Chew 81 mg every day.      Prenatal MV-Min-Fe Fum-FA-DHA (PRENATAL 1 PO) Take  by mouth.      Doxylamine-Pyridoxine 10-10 MG Tablet Delayed Response delayed-release tablet Take 2 Tablets by mouth at bedtime. (Patient not taking: Reported on 2022) 60 Tablet 2    albuterol 108 (90 Base) MCG/ACT Aero Soln inhalation aerosol Inhale 2 Puffs every 6 hours as needed. (Patient not taking: Reported on 2022) 8.5 g 1     No current facility-administered medications on file prior to visit.       O:    Vitals:    23 1347   BP: 116/68   Weight: 217 lb       FH: 38 cm  FHT: 155 BPM    Presentation: cephalic by Leopolds    See flow sheet.    A:   IUP 37w4d  S=D  Patient Active Problem List    Diagnosis Date Noted    Rh negative state in antepartum period 2022    Supervision of high risk pregnancy in third trimester 2022    History of  delivery - desires TOLAC 2022    Vaginal discharge 03/15/2022    LGSIL of cervix of undetermined significance 03/15/2022    LGSIL on Pap smear of cervix - f/u PAP 1 yr 2020       P:  Continue FKCs.    Labor and return precautions given.  Instructions given on where to go - patient verbalized understanding.  All questions answered. Anticipatory guidance.  Encouraged adequate water intake.  Repeat C/S scheduled for 2023 - instructions given today  GBS positive - reviewed with patient  F/u 1 week and PRN    No orders of the defined types were placed in this encounter.      Pregnancy Checklist  Prenatal labs: UTI --> treated, otherwise WNL  EPDS:  not completed  Last Pap: 3/15/22: NILM  Quad screen/NIPT/MASFP/Carrier Screen: declines  Anatomy US: being followed by [facility], see documents in media  3rd trimester labs:WNL  Tdap: administered 12/8/22  Flu vaccine: administered 12/8/22  Rhogam: administered 12/8/22  PP Contraception plan: declines PP birth control  Bilateral salpingectomy: declines 12/8/22  GBS: positive  Hx C/S: yes  Desires TOLAC?: yes  - Counseled by Dr. Davis and pt changed mind to repeat C/S d/t past T incison  Repeat C/S scheduled: 1/30/23    Anette Parkinson C.N.M.

## 2023-01-20 NOTE — PROGRESS NOTES
Pt here today for OB follow up  Pt states no complaints   Reports +FM  Good # [phone]  Pharmacy Confirmed.  Chaperone offered and not indicated.   Pt has a C/S with Dr. Jaffe on 1/30. Pt given instructions.  GBS positive

## 2023-01-25 ENCOUNTER — PRE-ADMISSION TESTING (OUTPATIENT)
Dept: ADMISSIONS | Facility: MEDICAL CENTER | Age: 23
End: 2023-01-25
Attending: OBSTETRICS & GYNECOLOGY
Payer: MEDICAID

## 2023-01-25 RX ORDER — NITROFURANTOIN 25; 75 MG/1; MG/1
100 CAPSULE ORAL 2 TIMES DAILY
COMMUNITY
End: 2023-01-27

## 2023-01-25 NOTE — OR NURSING
RN preadmission telephone appointment completed with Divine Elise. I advised Divine to refer to her OB regarding ASA administration prior to surgery.

## 2023-01-26 NOTE — PROGRESS NOTES
S:   Divine is a 22 y.o.  at 38w4d. Her RHODA is 2023, by Last Menstrual Period. She is here for routine OB follow up.  Reports positive FM.  Denies VB, LOF, RUCs or vaginal DC. No concerns today. Finished abx for UTI - no s/sx of UTI at this time.    Current Outpatient Medications on File Prior to Visit   Medication Sig Dispense Refill    nitrofurantoin (MACROBID) 100 MG Cap Take 100 mg by mouth 2 times a day.      aspirin (ASA) 81 MG Chew Tab chewable tablet Chew 81 mg every day.      Prenatal MV-Min-Fe Fum-FA-DHA (PRENATAL 1 PO) Take 1 Tablet by mouth every day.      Doxylamine-Pyridoxine 10-10 MG Tablet Delayed Response delayed-release tablet Take 2 Tablets by mouth at bedtime. (Patient not taking: Reported on 2022) 60 Tablet 2    albuterol 108 (90 Base) MCG/ACT Aero Soln inhalation aerosol Inhale 2 Puffs every 6 hours as needed. (Patient not taking: Reported on 2022) 8.5 g 1     No current facility-administered medications on file prior to visit.       O:    There were no vitals filed for this visit.    FH: 38 cm  FHT: 142 BPM  SVE: declined  Presentation: cephalic by Leopolds      See flow sheet.    A:   IUP 38w4d  S=D  Patient Active Problem List    Diagnosis Date Noted    Rh negative state in antepartum period 2022    Supervision of high risk pregnancy in third trimester 2022    History of  delivery - desires TOLAC 2022    Vaginal discharge 03/15/2022    LGSIL of cervix of undetermined significance 03/15/2022    LGSIL on Pap smear of cervix - f/u PAP 1 yr 2020       P:  Continue FKCs.    Labor and return precautions given.  Instructions given on where to go - patient verbalized understanding.  All questions answered. Anticipatory guidance.  Encouraged adequate water intake.  Repeat C/S scheduled for 22- instructions given with phone number to call  Desires vasectomy for PP contraception  GBS positive   F/u 1 week and PRN    No orders of the defined  types were placed in this encounter.        Pregnancy Checklist  Prenatal labs: + UTI  EPDS: not completed  Last Pap: 3/15/22: NILM  Quad screen/NIPT/MASFP/Carrier Screen: declines  Anatomy US: being followed by [facility], see documents in media  3rd trimester labs: have not been collected as of 1/5/23, except 1 hour GTT WNL  Tdap: administered 12/8/22  Flu vaccine: administered 12/8/22  Rhogam: administered 12/8/22  PP Contraception plan: vasectomy  Bilateral salpingectomy: declines 12/8/22  GBS: positive  Hx C/S: yes  Desires TOLAC?: no  Repeat C/S scheduled for 1/30/22    JO ANN HernandezNOSCAR.

## 2023-01-27 ENCOUNTER — ROUTINE PRENATAL (OUTPATIENT)
Dept: OBGYN | Facility: CLINIC | Age: 23
End: 2023-01-27
Payer: MEDICAID

## 2023-01-27 VITALS — WEIGHT: 218 LBS | DIASTOLIC BLOOD PRESSURE: 72 MMHG | BODY MASS INDEX: 36.28 KG/M2 | SYSTOLIC BLOOD PRESSURE: 108 MMHG

## 2023-01-27 DIAGNOSIS — Z34.83 ENCOUNTER FOR SUPERVISION OF OTHER NORMAL PREGNANCY IN THIRD TRIMESTER: ICD-10-CM

## 2023-01-27 PROCEDURE — 0502F SUBSEQUENT PRENATAL CARE: CPT

## 2023-01-27 ASSESSMENT — FIBROSIS 4 INDEX: FIB4 SCORE: 0.62

## 2023-01-27 NOTE — PROGRESS NOTES
Pt here today for OB follow up  Pt denies cramping, discharge, or bleeding.  Reports +  Good # [phone]   Pharmacy Confirmed.

## 2023-01-30 ENCOUNTER — ANESTHESIA (OUTPATIENT)
Dept: OBGYN | Facility: MEDICAL CENTER | Age: 23
End: 2023-01-30
Payer: MEDICAID

## 2023-01-30 ENCOUNTER — HOSPITAL ENCOUNTER (INPATIENT)
Facility: MEDICAL CENTER | Age: 23
LOS: 2 days | End: 2023-02-01
Attending: OBSTETRICS & GYNECOLOGY | Admitting: OBSTETRICS & GYNECOLOGY
Payer: MEDICAID

## 2023-01-30 ENCOUNTER — ANESTHESIA EVENT (OUTPATIENT)
Dept: OBGYN | Facility: MEDICAL CENTER | Age: 23
End: 2023-01-30
Payer: MEDICAID

## 2023-01-30 DIAGNOSIS — Z98.891 HISTORY OF CESAREAN DELIVERY: ICD-10-CM

## 2023-01-30 LAB
BASOPHILS # BLD AUTO: 0.4 % (ref 0–1.8)
BASOPHILS # BLD: 0.04 K/UL (ref 0–0.12)
EOSINOPHIL # BLD AUTO: 0.06 K/UL (ref 0–0.51)
EOSINOPHIL NFR BLD: 0.6 % (ref 0–6.9)
ERYTHROCYTE [DISTWIDTH] IN BLOOD BY AUTOMATED COUNT: 42.7 FL (ref 35.9–50)
ERYTHROCYTE [DISTWIDTH] IN BLOOD BY AUTOMATED COUNT: 43.6 FL (ref 35.9–50)
HCT VFR BLD AUTO: 35.7 % (ref 37–47)
HCT VFR BLD AUTO: 37.3 % (ref 37–47)
HGB BLD-MCNC: 12.5 G/DL (ref 12–16)
HGB BLD-MCNC: 13 G/DL (ref 12–16)
HOLDING TUBE BB 8507: NORMAL
IMM GRANULOCYTES # BLD AUTO: 0.05 K/UL (ref 0–0.11)
IMM GRANULOCYTES NFR BLD AUTO: 0.5 % (ref 0–0.9)
LYMPHOCYTES # BLD AUTO: 2.06 K/UL (ref 1–4.8)
LYMPHOCYTES NFR BLD: 20.9 % (ref 22–41)
MCH RBC QN AUTO: 30.7 PG (ref 27–33)
MCH RBC QN AUTO: 30.9 PG (ref 27–33)
MCHC RBC AUTO-ENTMCNC: 34.9 G/DL (ref 33.6–35)
MCHC RBC AUTO-ENTMCNC: 35 G/DL (ref 33.6–35)
MCV RBC AUTO: 88.1 FL (ref 81.4–97.8)
MCV RBC AUTO: 88.2 FL (ref 81.4–97.8)
MONOCYTES # BLD AUTO: 0.63 K/UL (ref 0–0.85)
MONOCYTES NFR BLD AUTO: 6.4 % (ref 0–13.4)
NEUTROPHILS # BLD AUTO: 7.03 K/UL (ref 2–7.15)
NEUTROPHILS NFR BLD: 71.2 % (ref 44–72)
NRBC # BLD AUTO: 0 K/UL
NRBC BLD-RTO: 0 /100 WBC
NUMBER OF RH DOSES IND 8505RD: NORMAL
PLATELET # BLD AUTO: 165 K/UL (ref 164–446)
PLATELET # BLD AUTO: 201 K/UL (ref 164–446)
PMV BLD AUTO: 10.4 FL (ref 9–12.9)
PMV BLD AUTO: 10.5 FL (ref 9–12.9)
RBC # BLD AUTO: 4.05 M/UL (ref 4.2–5.4)
RBC # BLD AUTO: 4.23 M/UL (ref 4.2–5.4)
T PALLIDUM AB SER QL IA: NORMAL
WBC # BLD AUTO: 16 K/UL (ref 4.8–10.8)
WBC # BLD AUTO: 9.9 K/UL (ref 4.8–10.8)

## 2023-01-30 PROCEDURE — 160002 HCHG RECOVERY MINUTES (STAT): Performed by: OBSTETRICS & GYNECOLOGY

## 2023-01-30 PROCEDURE — 700111 HCHG RX REV CODE 636 W/ 250 OVERRIDE (IP): Performed by: ANESTHESIOLOGY

## 2023-01-30 PROCEDURE — 160048 HCHG OR STATISTICAL LEVEL 1-5: Performed by: OBSTETRICS & GYNECOLOGY

## 2023-01-30 PROCEDURE — 86780 TREPONEMA PALLIDUM: CPT

## 2023-01-30 PROCEDURE — 700105 HCHG RX REV CODE 258: Performed by: ANESTHESIOLOGY

## 2023-01-30 PROCEDURE — 59515 CESAREAN DELIVERY: CPT | Performed by: OBSTETRICS & GYNECOLOGY

## 2023-01-30 PROCEDURE — 36415 COLL VENOUS BLD VENIPUNCTURE: CPT

## 2023-01-30 PROCEDURE — 160035 HCHG PACU - 1ST 60 MINS PHASE I: Performed by: OBSTETRICS & GYNECOLOGY

## 2023-01-30 PROCEDURE — 01961 ANES CESAREAN DELIVERY ONLY: CPT | Performed by: ANESTHESIOLOGY

## 2023-01-30 PROCEDURE — A9270 NON-COVERED ITEM OR SERVICE: HCPCS | Performed by: ANESTHESIOLOGY

## 2023-01-30 PROCEDURE — 85025 COMPLETE CBC W/AUTO DIFF WBC: CPT

## 2023-01-30 PROCEDURE — 160041 HCHG SURGERY MINUTES - EA ADDL 1 MIN LEVEL 4: Performed by: OBSTETRICS & GYNECOLOGY

## 2023-01-30 PROCEDURE — 85027 COMPLETE CBC AUTOMATED: CPT

## 2023-01-30 PROCEDURE — 700111 HCHG RX REV CODE 636 W/ 250 OVERRIDE (IP): Performed by: OBSTETRICS & GYNECOLOGY

## 2023-01-30 PROCEDURE — 700102 HCHG RX REV CODE 250 W/ 637 OVERRIDE(OP): Performed by: ANESTHESIOLOGY

## 2023-01-30 PROCEDURE — C1755 CATHETER, INTRASPINAL: HCPCS | Performed by: OBSTETRICS & GYNECOLOGY

## 2023-01-30 PROCEDURE — 770002 HCHG ROOM/CARE - OB PRIVATE (112)

## 2023-01-30 PROCEDURE — 160029 HCHG SURGERY MINUTES - 1ST 30 MINS LEVEL 4: Performed by: OBSTETRICS & GYNECOLOGY

## 2023-01-30 PROCEDURE — 160009 HCHG ANES TIME/MIN: Performed by: OBSTETRICS & GYNECOLOGY

## 2023-01-30 PROCEDURE — 700101 HCHG RX REV CODE 250: Performed by: ANESTHESIOLOGY

## 2023-01-30 RX ORDER — DIPHENHYDRAMINE HYDROCHLORIDE 50 MG/ML
12.5 INJECTION INTRAMUSCULAR; INTRAVENOUS
Status: DISCONTINUED | OUTPATIENT
Start: 2023-01-30 | End: 2023-01-30 | Stop reason: HOSPADM

## 2023-01-30 RX ORDER — DEXAMETHASONE SODIUM PHOSPHATE 4 MG/ML
INJECTION, SOLUTION INTRA-ARTICULAR; INTRALESIONAL; INTRAMUSCULAR; INTRAVENOUS; SOFT TISSUE PRN
Status: DISCONTINUED | OUTPATIENT
Start: 2023-01-30 | End: 2023-01-30 | Stop reason: SURG

## 2023-01-30 RX ORDER — OXYCODONE HYDROCHLORIDE 10 MG/1
10 TABLET ORAL EVERY 4 HOURS PRN
Status: ACTIVE | OUTPATIENT
Start: 2023-01-30 | End: 2023-01-31

## 2023-01-30 RX ORDER — HYDRALAZINE HYDROCHLORIDE 20 MG/ML
5 INJECTION INTRAMUSCULAR; INTRAVENOUS
Status: DISCONTINUED | OUTPATIENT
Start: 2023-01-30 | End: 2023-01-30 | Stop reason: HOSPADM

## 2023-01-30 RX ORDER — DIPHENHYDRAMINE HYDROCHLORIDE 50 MG/ML
12.5 INJECTION INTRAMUSCULAR; INTRAVENOUS EVERY 6 HOURS PRN
Status: ACTIVE | OUTPATIENT
Start: 2023-01-30 | End: 2023-01-31

## 2023-01-30 RX ORDER — SODIUM CHLORIDE, SODIUM GLUCONATE, SODIUM ACETATE, POTASSIUM CHLORIDE AND MAGNESIUM CHLORIDE 526; 502; 368; 37; 30 MG/100ML; MG/100ML; MG/100ML; MG/100ML; MG/100ML
1500 INJECTION, SOLUTION INTRAVENOUS ONCE
Status: COMPLETED | OUTPATIENT
Start: 2023-01-30 | End: 2023-01-30

## 2023-01-30 RX ORDER — ONDANSETRON 2 MG/ML
4 INJECTION INTRAMUSCULAR; INTRAVENOUS
Status: DISCONTINUED | OUTPATIENT
Start: 2023-01-30 | End: 2023-01-30 | Stop reason: HOSPADM

## 2023-01-30 RX ORDER — OXYCODONE HYDROCHLORIDE 5 MG/1
5 TABLET ORAL EVERY 4 HOURS PRN
Status: DISPENSED | OUTPATIENT
Start: 2023-01-30 | End: 2023-01-31

## 2023-01-30 RX ORDER — SODIUM CHLORIDE, SODIUM GLUCONATE, SODIUM ACETATE, POTASSIUM CHLORIDE AND MAGNESIUM CHLORIDE 526; 502; 368; 37; 30 MG/100ML; MG/100ML; MG/100ML; MG/100ML; MG/100ML
INJECTION, SOLUTION INTRAVENOUS
Status: DISCONTINUED | OUTPATIENT
Start: 2023-01-30 | End: 2023-01-30 | Stop reason: SURG

## 2023-01-30 RX ORDER — ONDANSETRON 2 MG/ML
INJECTION INTRAMUSCULAR; INTRAVENOUS PRN
Status: DISCONTINUED | OUTPATIENT
Start: 2023-01-30 | End: 2023-01-30 | Stop reason: SURG

## 2023-01-30 RX ORDER — ALBUTEROL SULFATE 2.5 MG/3ML
2.5 SOLUTION RESPIRATORY (INHALATION)
Status: DISCONTINUED | OUTPATIENT
Start: 2023-01-30 | End: 2023-01-30 | Stop reason: HOSPADM

## 2023-01-30 RX ORDER — BUPIVACAINE HYDROCHLORIDE 7.5 MG/ML
INJECTION, SOLUTION INTRASPINAL PRN
Status: DISCONTINUED | OUTPATIENT
Start: 2023-01-30 | End: 2023-01-30 | Stop reason: SURG

## 2023-01-30 RX ORDER — HYDROMORPHONE HYDROCHLORIDE 1 MG/ML
0.2 INJECTION, SOLUTION INTRAMUSCULAR; INTRAVENOUS; SUBCUTANEOUS
Status: ACTIVE | OUTPATIENT
Start: 2023-01-30 | End: 2023-01-31

## 2023-01-30 RX ORDER — SIMETHICONE 125 MG
125 TABLET,CHEWABLE ORAL 4 TIMES DAILY PRN
Status: DISCONTINUED | OUTPATIENT
Start: 2023-01-30 | End: 2023-02-01 | Stop reason: HOSPADM

## 2023-01-30 RX ORDER — KETOROLAC TROMETHAMINE 30 MG/ML
INJECTION, SOLUTION INTRAMUSCULAR; INTRAVENOUS PRN
Status: DISCONTINUED | OUTPATIENT
Start: 2023-01-30 | End: 2023-01-30 | Stop reason: SURG

## 2023-01-30 RX ORDER — OXYCODONE HYDROCHLORIDE 10 MG/1
10 TABLET ORAL EVERY 4 HOURS PRN
Status: DISCONTINUED | OUTPATIENT
Start: 2023-01-31 | End: 2023-02-01 | Stop reason: HOSPADM

## 2023-01-30 RX ORDER — METOCLOPRAMIDE HYDROCHLORIDE 5 MG/ML
10 INJECTION INTRAMUSCULAR; INTRAVENOUS ONCE
Status: COMPLETED | OUTPATIENT
Start: 2023-01-30 | End: 2023-01-30

## 2023-01-30 RX ORDER — ACETAMINOPHEN 500 MG
1000 TABLET ORAL EVERY 6 HOURS
Status: DISCONTINUED | OUTPATIENT
Start: 2023-01-31 | End: 2023-02-01 | Stop reason: HOSPADM

## 2023-01-30 RX ORDER — MORPHINE SULFATE 0.5 MG/ML
INJECTION, SOLUTION EPIDURAL; INTRATHECAL; INTRAVENOUS PRN
Status: DISCONTINUED | OUTPATIENT
Start: 2023-01-30 | End: 2023-01-30 | Stop reason: SURG

## 2023-01-30 RX ORDER — CEFAZOLIN SODIUM 1 G/3ML
2 INJECTION, POWDER, FOR SOLUTION INTRAMUSCULAR; INTRAVENOUS ONCE
Status: COMPLETED | OUTPATIENT
Start: 2023-01-30 | End: 2023-01-30

## 2023-01-30 RX ORDER — HYDROMORPHONE HYDROCHLORIDE 1 MG/ML
0.1 INJECTION, SOLUTION INTRAMUSCULAR; INTRAVENOUS; SUBCUTANEOUS
Status: DISCONTINUED | OUTPATIENT
Start: 2023-01-30 | End: 2023-01-30 | Stop reason: HOSPADM

## 2023-01-30 RX ORDER — DIPHENHYDRAMINE HYDROCHLORIDE 50 MG/ML
25 INJECTION INTRAMUSCULAR; INTRAVENOUS EVERY 6 HOURS PRN
Status: ACTIVE | OUTPATIENT
Start: 2023-01-30 | End: 2023-01-31

## 2023-01-30 RX ORDER — SODIUM CHLORIDE, SODIUM LACTATE, POTASSIUM CHLORIDE, CALCIUM CHLORIDE 600; 310; 30; 20 MG/100ML; MG/100ML; MG/100ML; MG/100ML
INJECTION, SOLUTION INTRAVENOUS ONCE
Status: ACTIVE | OUTPATIENT
Start: 2023-01-30 | End: 2023-01-31

## 2023-01-30 RX ORDER — ONDANSETRON 2 MG/ML
4 INJECTION INTRAMUSCULAR; INTRAVENOUS EVERY 6 HOURS PRN
Status: DISCONTINUED | OUTPATIENT
Start: 2023-01-31 | End: 2023-02-01 | Stop reason: HOSPADM

## 2023-01-30 RX ORDER — DIPHENHYDRAMINE HCL 25 MG
25 TABLET ORAL EVERY 6 HOURS PRN
Status: DISCONTINUED | OUTPATIENT
Start: 2023-01-31 | End: 2023-02-01 | Stop reason: HOSPADM

## 2023-01-30 RX ORDER — HYDROMORPHONE HYDROCHLORIDE 1 MG/ML
0.2 INJECTION, SOLUTION INTRAMUSCULAR; INTRAVENOUS; SUBCUTANEOUS
Status: DISCONTINUED | OUTPATIENT
Start: 2023-01-30 | End: 2023-01-30 | Stop reason: HOSPADM

## 2023-01-30 RX ORDER — OXYTOCIN 10 [USP'U]/ML
10 INJECTION, SOLUTION INTRAMUSCULAR; INTRAVENOUS
Status: DISCONTINUED | OUTPATIENT
Start: 2023-01-30 | End: 2023-02-01 | Stop reason: HOSPADM

## 2023-01-30 RX ORDER — ONDANSETRON 2 MG/ML
4 INJECTION INTRAMUSCULAR; INTRAVENOUS EVERY 6 HOURS PRN
Status: ACTIVE | OUTPATIENT
Start: 2023-01-30 | End: 2023-01-31

## 2023-01-30 RX ORDER — ACETAMINOPHEN 500 MG
1000 TABLET ORAL EVERY 8 HOURS
Status: COMPLETED | OUTPATIENT
Start: 2023-01-30 | End: 2023-01-31

## 2023-01-30 RX ORDER — OXYCODONE HCL 5 MG/5 ML
10 SOLUTION, ORAL ORAL
Status: DISCONTINUED | OUTPATIENT
Start: 2023-01-30 | End: 2023-01-30 | Stop reason: HOSPADM

## 2023-01-30 RX ORDER — KETOROLAC TROMETHAMINE 30 MG/ML
30 INJECTION, SOLUTION INTRAMUSCULAR; INTRAVENOUS EVERY 6 HOURS
Status: COMPLETED | OUTPATIENT
Start: 2023-01-30 | End: 2023-01-31

## 2023-01-30 RX ORDER — VITAMIN A ACETATE, BETA CAROTENE, ASCORBIC ACID, CHOLECALCIFEROL, .ALPHA.-TOCOPHEROL ACETATE, DL-, THIAMINE MONONITRATE, RIBOFLAVIN, NIACINAMIDE, PYRIDOXINE HYDROCHLORIDE, FOLIC ACID, CYANOCOBALAMIN, CALCIUM CARBONATE, FERROUS FUMARATE, ZINC OXIDE, CUPRIC OXIDE 3080; 12; 120; 400; 1; 1.84; 3; 20; 22; 920; 25; 200; 27; 10; 2 [IU]/1; UG/1; MG/1; [IU]/1; MG/1; MG/1; MG/1; MG/1; MG/1; [IU]/1; MG/1; MG/1; MG/1; MG/1; MG/1
1 TABLET, FILM COATED ORAL
Status: DISCONTINUED | OUTPATIENT
Start: 2023-01-31 | End: 2023-02-01 | Stop reason: HOSPADM

## 2023-01-30 RX ORDER — SODIUM CHLORIDE, SODIUM LACTATE, POTASSIUM CHLORIDE, CALCIUM CHLORIDE 600; 310; 30; 20 MG/100ML; MG/100ML; MG/100ML; MG/100ML
INJECTION, SOLUTION INTRAVENOUS PRN
Status: DISCONTINUED | OUTPATIENT
Start: 2023-01-30 | End: 2023-02-01 | Stop reason: HOSPADM

## 2023-01-30 RX ORDER — DOCUSATE SODIUM 100 MG/1
100 CAPSULE, LIQUID FILLED ORAL 2 TIMES DAILY PRN
Status: DISCONTINUED | OUTPATIENT
Start: 2023-01-30 | End: 2023-02-01 | Stop reason: HOSPADM

## 2023-01-30 RX ORDER — ACETAMINOPHEN 500 MG
1000 TABLET ORAL EVERY 6 HOURS PRN
Status: DISCONTINUED | OUTPATIENT
Start: 2023-02-03 | End: 2023-02-01 | Stop reason: HOSPADM

## 2023-01-30 RX ORDER — IBUPROFEN 800 MG/1
800 TABLET ORAL EVERY 8 HOURS
Status: DISCONTINUED | OUTPATIENT
Start: 2023-01-31 | End: 2023-02-01 | Stop reason: HOSPADM

## 2023-01-30 RX ORDER — SODIUM CHLORIDE, SODIUM LACTATE, POTASSIUM CHLORIDE, CALCIUM CHLORIDE 600; 310; 30; 20 MG/100ML; MG/100ML; MG/100ML; MG/100ML
INJECTION, SOLUTION INTRAVENOUS CONTINUOUS
Status: DISCONTINUED | OUTPATIENT
Start: 2023-01-30 | End: 2023-01-30

## 2023-01-30 RX ORDER — OXYCODONE HCL 5 MG/5 ML
5 SOLUTION, ORAL ORAL
Status: DISCONTINUED | OUTPATIENT
Start: 2023-01-30 | End: 2023-01-30 | Stop reason: HOSPADM

## 2023-01-30 RX ORDER — CITRIC ACID/SODIUM CITRATE 334-500MG
30 SOLUTION, ORAL ORAL ONCE
Status: COMPLETED | OUTPATIENT
Start: 2023-01-30 | End: 2023-01-30

## 2023-01-30 RX ORDER — ONDANSETRON 4 MG/1
4 TABLET, ORALLY DISINTEGRATING ORAL EVERY 6 HOURS PRN
Status: DISCONTINUED | OUTPATIENT
Start: 2023-01-31 | End: 2023-02-01 | Stop reason: HOSPADM

## 2023-01-30 RX ORDER — IBUPROFEN 800 MG/1
800 TABLET ORAL EVERY 8 HOURS PRN
Status: DISCONTINUED | OUTPATIENT
Start: 2023-02-03 | End: 2023-02-01 | Stop reason: HOSPADM

## 2023-01-30 RX ORDER — MEPERIDINE HYDROCHLORIDE 25 MG/ML
6.25 INJECTION INTRAMUSCULAR; INTRAVENOUS; SUBCUTANEOUS
Status: DISCONTINUED | OUTPATIENT
Start: 2023-01-30 | End: 2023-01-30 | Stop reason: HOSPADM

## 2023-01-30 RX ORDER — HALOPERIDOL 5 MG/ML
1 INJECTION INTRAMUSCULAR
Status: DISCONTINUED | OUTPATIENT
Start: 2023-01-30 | End: 2023-01-30 | Stop reason: HOSPADM

## 2023-01-30 RX ORDER — LABETALOL HYDROCHLORIDE 5 MG/ML
5 INJECTION, SOLUTION INTRAVENOUS
Status: DISCONTINUED | OUTPATIENT
Start: 2023-01-30 | End: 2023-01-30 | Stop reason: HOSPADM

## 2023-01-30 RX ORDER — DIPHENHYDRAMINE HYDROCHLORIDE 50 MG/ML
25 INJECTION INTRAMUSCULAR; INTRAVENOUS EVERY 6 HOURS PRN
Status: DISCONTINUED | OUTPATIENT
Start: 2023-01-31 | End: 2023-02-01 | Stop reason: HOSPADM

## 2023-01-30 RX ORDER — HYDROMORPHONE HYDROCHLORIDE 1 MG/ML
0.4 INJECTION, SOLUTION INTRAMUSCULAR; INTRAVENOUS; SUBCUTANEOUS
Status: DISCONTINUED | OUTPATIENT
Start: 2023-01-30 | End: 2023-01-30 | Stop reason: HOSPADM

## 2023-01-30 RX ORDER — OXYCODONE HYDROCHLORIDE 5 MG/1
5 TABLET ORAL EVERY 4 HOURS PRN
Status: DISCONTINUED | OUTPATIENT
Start: 2023-01-31 | End: 2023-02-01 | Stop reason: HOSPADM

## 2023-01-30 RX ORDER — CALCIUM CARBONATE 500 MG/1
1000 TABLET, CHEWABLE ORAL EVERY 6 HOURS PRN
Status: DISCONTINUED | OUTPATIENT
Start: 2023-01-30 | End: 2023-02-01 | Stop reason: HOSPADM

## 2023-01-30 RX ORDER — SODIUM CHLORIDE, SODIUM LACTATE, POTASSIUM CHLORIDE, CALCIUM CHLORIDE 600; 310; 30; 20 MG/100ML; MG/100ML; MG/100ML; MG/100ML
INJECTION, SOLUTION INTRAVENOUS CONTINUOUS
Status: DISCONTINUED | OUTPATIENT
Start: 2023-01-30 | End: 2023-02-01 | Stop reason: HOSPADM

## 2023-01-30 RX ADMIN — KETOROLAC TROMETHAMINE 30 MG: 30 INJECTION, SOLUTION INTRAMUSCULAR at 16:45

## 2023-01-30 RX ADMIN — SODIUM CITRATE AND CITRIC ACID MONOHYDRATE 30 ML: 500; 334 SOLUTION ORAL at 09:31

## 2023-01-30 RX ADMIN — FENTANYL CITRATE 10 MCG: 50 INJECTION, SOLUTION INTRAMUSCULAR; INTRAVENOUS at 09:53

## 2023-01-30 RX ADMIN — OXYTOCIN 125 ML/HR: 10 INJECTION, SOLUTION INTRAMUSCULAR; INTRAVENOUS at 12:18

## 2023-01-30 RX ADMIN — MORPHINE SULFATE 100 MCG: 0.5 INJECTION, SOLUTION EPIDURAL; INTRATHECAL; INTRAVENOUS at 09:53

## 2023-01-30 RX ADMIN — ACETAMINOPHEN 1000 MG: 500 TABLET, FILM COATED ORAL at 14:22

## 2023-01-30 RX ADMIN — BUPIVACAINE HYDROCHLORIDE IN DEXTROSE 1.5 ML: 7.5 INJECTION, SOLUTION SUBARACHNOID at 09:53

## 2023-01-30 RX ADMIN — SODIUM CHLORIDE, SODIUM GLUCONATE, SODIUM ACETATE, POTASSIUM CHLORIDE AND MAGNESIUM CHLORIDE 1500 ML: 526; 502; 368; 37; 30 INJECTION, SOLUTION INTRAVENOUS at 09:32

## 2023-01-30 RX ADMIN — KETOROLAC TROMETHAMINE 30 MG: 30 INJECTION, SOLUTION INTRAMUSCULAR at 22:22

## 2023-01-30 RX ADMIN — CEFAZOLIN 2 G: 330 INJECTION, POWDER, FOR SOLUTION INTRAMUSCULAR; INTRAVENOUS at 09:54

## 2023-01-30 RX ADMIN — PHENYLEPHRINE HYDROCHLORIDE 0.25 MCG/KG/MIN: 10 INJECTION INTRAVENOUS at 09:54

## 2023-01-30 RX ADMIN — ACETAMINOPHEN 1000 MG: 500 TABLET, FILM COATED ORAL at 22:22

## 2023-01-30 RX ADMIN — KETOROLAC TROMETHAMINE 30 MG: 30 INJECTION, SOLUTION INTRAMUSCULAR at 10:50

## 2023-01-30 RX ADMIN — FAMOTIDINE 20 MG: 10 INJECTION, SOLUTION INTRAVENOUS at 09:31

## 2023-01-30 RX ADMIN — ONDANSETRON 4 MG: 2 INJECTION INTRAMUSCULAR; INTRAVENOUS at 09:54

## 2023-01-30 RX ADMIN — DEXAMETHASONE SODIUM PHOSPHATE 8 MG: 4 INJECTION, SOLUTION INTRA-ARTICULAR; INTRALESIONAL; INTRAMUSCULAR; INTRAVENOUS; SOFT TISSUE at 09:54

## 2023-01-30 RX ADMIN — SODIUM CHLORIDE, SODIUM GLUCONATE, SODIUM ACETATE, POTASSIUM CHLORIDE AND MAGNESIUM CHLORIDE: 526; 502; 368; 37; 30 INJECTION, SOLUTION INTRAVENOUS at 09:38

## 2023-01-30 RX ADMIN — METOCLOPRAMIDE 10 MG: 5 INJECTION, SOLUTION INTRAMUSCULAR; INTRAVENOUS at 09:30

## 2023-01-30 RX ADMIN — OXYTOCIN 1000 ML: 10 INJECTION, SOLUTION INTRAMUSCULAR; INTRAVENOUS at 10:17

## 2023-01-30 ASSESSMENT — PAIN DESCRIPTION - PAIN TYPE
TYPE: ACUTE PAIN
TYPE: ACUTE PAIN;SURGICAL PAIN

## 2023-01-30 ASSESSMENT — COPD QUESTIONNAIRES
HAVE YOU SMOKED AT LEAST 100 CIGARETTES IN YOUR ENTIRE LIFE: NO/DON'T KNOW
IN THE PAST 12 MONTHS DO YOU DO LESS THAN YOU USED TO BECAUSE OF YOUR BREATHING PROBLEMS: DISAGREE/UNSURE
DURING THE PAST 4 WEEKS HOW MUCH DID YOU FEEL SHORT OF BREATH: NONE/LITTLE OF THE TIME
DO YOU EVER COUGH UP ANY MUCUS OR PHLEGM?: NO/ONLY WITH OCCASIONAL COLDS OR INFECTIONS
COPD SCREENING SCORE: 0

## 2023-01-30 ASSESSMENT — PATIENT HEALTH QUESTIONNAIRE - PHQ9
1. LITTLE INTEREST OR PLEASURE IN DOING THINGS: NOT AT ALL
2. FEELING DOWN, DEPRESSED, IRRITABLE, OR HOPELESS: NOT AT ALL
SUM OF ALL RESPONSES TO PHQ9 QUESTIONS 1 AND 2: 0

## 2023-01-30 ASSESSMENT — LIFESTYLE VARIABLES
ALCOHOL_USE: NO
TOTAL SCORE: 0
HAVE YOU EVER FELT YOU SHOULD CUT DOWN ON YOUR DRINKING: NO
CONSUMPTION TOTAL: INCOMPLETE
EVER HAD A DRINK FIRST THING IN THE MORNING TO STEADY YOUR NERVES TO GET RID OF A HANGOVER: NO
EVER_SMOKED: NEVER
TOTAL SCORE: 0
TOTAL SCORE: 0
EVER FELT BAD OR GUILTY ABOUT YOUR DRINKING: NO
HAVE PEOPLE ANNOYED YOU BY CRITICIZING YOUR DRINKING: NO

## 2023-01-30 ASSESSMENT — EDINBURGH POSTNATAL DEPRESSION SCALE (EPDS)
I HAVE FELT SAD OR MISERABLE: NOT VERY OFTEN
I HAVE LOOKED FORWARD WITH ENJOYMENT TO THINGS: AS MUCH AS I EVER DID
I HAVE BEEN SO UNHAPPY THAT I HAVE HAD DIFFICULTY SLEEPING: NOT AT ALL
I HAVE BLAMED MYSELF UNNECESSARILY WHEN THINGS WENT WRONG: YES, SOME OF THE TIME
THINGS HAVE BEEN GETTING ON TOP OF ME: NO, MOST OF THE TIME I HAVE COPED QUITE WELL
THE THOUGHT OF HARMING MYSELF HAS OCCURRED TO ME: NEVER
I HAVE BEEN ABLE TO LAUGH AND SEE THE FUNNY SIDE OF THINGS: AS MUCH AS I ALWAYS COULD
I HAVE BEEN ANXIOUS OR WORRIED FOR NO GOOD REASON: YES, SOMETIMES
I HAVE BEEN SO UNHAPPY THAT I HAVE BEEN CRYING: ONLY OCCASIONALLY
I HAVE FELT SCARED OR PANICKY FOR NO GOOD REASON: YES, SOMETIMES

## 2023-01-30 ASSESSMENT — PAIN SCALES - GENERAL: PAIN_LEVEL: 1

## 2023-01-30 ASSESSMENT — FIBROSIS 4 INDEX: FIB4 SCORE: 0.62

## 2023-01-30 NOTE — ANESTHESIA POSTPROCEDURE EVALUATION
Patient: Divine Elise    Procedure Summary     Date: 23 Room / Location: [facility] OR 01 / SURGERY LABOR AND DELIVERY    Anesthesia Start: 938 Anesthesia Stop:     Procedure: REPEAT  SECTION (Abdomen) Diagnosis:       Status post repeat low transverse  section      (PREVIOUS  SECTION-39 WEEKS)    Surgeons: Radha aJffe M.D. Responsible Provider: Дмитрий Schmidt M.D.    Anesthesia Type: spinal ASA Status: 2          Final Anesthesia Type: spinal  Last vitals  BP   Blood Pressure: 125/74    Temp   36.4 °C (97.6 °F)    Pulse   96   Resp        SpO2          Anesthesia Post Evaluation    Patient location during evaluation: PACU  Patient participation: complete - patient participated  Level of consciousness: awake and alert  Pain score: 1    Airway patency: patent  Anesthetic complications: no  Cardiovascular status: hemodynamically stable  Respiratory status: acceptable  Hydration status: euvolemic    PONV: none          No notable events documented.     Nurse Pain Score: 0 (NPRS)

## 2023-01-30 NOTE — H&P
History and Physical      Divine Elise is a 22 y.o. year old female  at 39w0d who presents for repeat  section.     Subjective:   negative  For CTXS.   negative Feels pain   negative for LOF  negative for vaginal bleeding.   positive for fetal movement    ROS: Pertinent items are noted in HPI.    Past Medical History:   Diagnosis Date    Head ache     Pregnant     Rh negative status during pregnancy 2020     Past Surgical History:   Procedure Laterality Date    PA  DELIVERY ONLY N/A 2020    Procedure:  SECTION, PRIMARY;  Surgeon: Marisol Cabrera D.O.;  Location: LABOR AND DELIVERY;  Service: Obstetrics    DENTAL EXTRACTION(S)      PRIMARY C SECTION      TONSILLECTOMY       OB History    Para Term  AB Living   2 1   1   1   SAB IAB Ectopic Molar Multiple Live Births           0 1      # Outcome Date GA Lbr Noel/2nd Weight Sex Delivery Anes PTL Lv   2 Current            1  20 25w6d  0.887 kg (1 lb 15.3 oz) M CS-LTranv Spinal Y LAURA     Social History     Socioeconomic History    Marital status: Single     Spouse name: Not on file    Number of children: Not on file    Years of education: Not on file    Highest education level: Not on file   Occupational History    Not on file   Tobacco Use    Smoking status: Never     Passive exposure: Never    Smokeless tobacco: Never   Vaping Use    Vaping Use: Never used   Substance and Sexual Activity    Alcohol use: Never    Drug use: Not Currently     Types: Inhaled     Comment: Marijuana daily    Sexual activity: Yes     Partners: Male     Birth control/protection: I.U.D.     Comment: IUD on 2020   Other Topics Concern    Not on file   Social History Narrative    Not on file     Social Determinants of Health     Financial Resource Strain: Not on file   Food Insecurity: Not on file   Transportation Needs: Not on file   Physical Activity: Not on file   Stress: Not on file   Social Connections: Not on file  "  Intimate Partner Violence: Not on file   Housing Stability: Not on file     Allergies: Patient has no known allergies.    Current Facility-Administered Medications:     electrolyte-A (PLASMALYTE-A) infusion 1,500 mL, 1,500 mL, Intravenous, Once, Дмитрий Schmidt M.D.    Na citrate-citric acid (BICITRA) 500-334 MG/5ML solution 30 mL, 30 mL, Oral, Once, Дмитрий Schmidt M.D.    famotidine (PEPCID) injection 20 mg, 20 mg, Intravenous, Once, Дмитрий Schmidt M.D.    metoclopramide (REGLAN) injection 10 mg, 10 mg, Intravenous, Once, Дмитрий Schmidt M.D.    [facility] with [facility] starting at 14 weeks with following problems:  Patient Active Problem List    Diagnosis Date Noted    Labor and delivery indication for care or intervention 2023    Rh negative state in antepartum period 2022    Supervision of high risk pregnancy in third trimester 2022    History of  delivery - desires TOLAC 2022    Vaginal discharge 03/15/2022    LGSIL of cervix of undetermined significance 03/15/2022    LGSIL on Pap smear of cervix - f/u PAP 1 yr 2020               Objective:      /74   Pulse 96   Temp 36.4 °C (97.6 °F)   Ht 1.651 m (5' 5\")   Wt 98.9 kg (218 lb)     General:   no acute distress   Skin:   normal   HEENT:  PERRLA   Lungs:   CTA bilateral   Heart:   S1, S2 normal, no murmur, click, rub or gallop, regular rate and rhythm, brisk carotid upstroke without bruits, peripheral pulses very brisk, chest is clear without rales or wheezing, no pedal edema, no JVD, no hepatosplenomegaly   Abdomen:   gravid, NT   EFW:  3400 grams   Pelvis:  adequate with gynecoid pelvis   FHT:  140s BPM, moderate variability, + accels   Uterine Size: S=D   Presentations: Cephalic   Cervix: deferred    Dilation:     Effacement:     Station:      Consistency:     Position:      Lab Review  Lab:   Blood type: O     Recent Results (from the past 5880 hour(s))   POCT Pregnancy    Collection " Time: 07/12/22  1:11 PM   Result Value Ref Range    POC Urine Pregnancy Test POSITIVE Negative    Internal Control Positive Valid     Internal Control Negative Valid    GLUCOSE 1HR GESTATIONAL    Collection Time: 12/22/22  1:20 PM   Result Value Ref Range    Glucose, Post Dose 88 70 - 139 mg/dL   URINALYSIS,CULTURE IF INDICATED    Collection Time: 01/05/23 11:32 AM    Specimen: Urine   Result Value Ref Range    Color Yellow     Character Clear     Specific Gravity 1.015 <1.035    Ph 7.0 5.0 - 8.0    Glucose Negative Negative mg/dL    Ketones Negative Negative mg/dL    Protein Negative Negative mg/dL    Bilirubin Negative Negative    Urobilinogen, Urine 0.2 Negative    Nitrite Negative Negative    Leukocyte Esterase Small (A) Negative    Occult Blood Large (A) Negative    Micro Urine Req Microscopic    PREG CNTR PRENATAL PN    Collection Time: 01/05/23 11:32 AM   Result Value Ref Range    WBC 12.6 (H) 4.8 - 10.8 K/uL    RBC 3.96 (L) 4.20 - 5.40 M/uL    Hemoglobin 12.6 12.0 - 16.0 g/dL    Hematocrit 36.1 (L) 37.0 - 47.0 %    MCV 91.2 81.4 - 97.8 fL    MCH 31.8 27.0 - 33.0 pg    MCHC 34.9 33.6 - 35.0 g/dL    RDW 44.1 35.9 - 50.0 fL    Platelet Count 198 164 - 446 K/uL    MPV 10.9 9.0 - 12.9 fL    Hepatitis C Antibody Non-Reactive Non-Reactive    Hepatitis B Surface Antigen Non-Reactive Non-Reactive    Rubella IgG Antibody 12.70 IU/mL    Syphilis, Treponemal Qual Non-Reactive Non-Reactive   URINE CULTURE(NEW)    Collection Time: 01/05/23 11:32 AM    Specimen: Urine   Result Value Ref Range    Significant Indicator POS (POS)     Source UR     Site Clean Catch     Culture Result Usual urogenital rayshawn 10-50,000 cfu/mL (A)     Culture Result Staphylococcus saprophyticus  >100,000 cfu/mL   (A)    HIV AG/AB COMBO ASSAY SCREENING    Collection Time: 01/05/23 11:32 AM   Result Value Ref Range    HIV Ag/Ab Combo Assay Non-Reactive Non Reactive   OP Prenatal Panel-Blood Bank    Collection Time: 01/05/23 11:32 AM   Result Value  Ref Range    ABO Grouping Only O     Rh Grouping Only NEG     Antibody Screen Scrn POS (A)    URINE MICROSCOPIC (W/UA)    Collection Time: 23 11:32 AM   Result Value Ref Range    WBC 5-10 (A) /hpf    RBC 20-50 (A) /hpf    Bacteria Few (A) None /hpf   ANTIBODY IDENTIFICATION    Collection Time: 23 11:32 AM   Result Value Ref Range    Antibody Id POS, anti-D due to RHIG injection (A)    GRP B STREP, BY PCR (JASMINE BROTH)    Collection Time: 23  4:01 PM    Specimen: Genital   Result Value Ref Range    Strep Gp B DNA PCR POSITIVE (A) Negative   CBC WITH DIFFERENTIAL    Collection Time: 23  7:50 AM   Result Value Ref Range    WBC 9.9 4.8 - 10.8 K/uL    RBC 4.23 4.20 - 5.40 M/uL    Hemoglobin 13.0 12.0 - 16.0 g/dL    Hematocrit 37.3 37.0 - 47.0 %    MCV 88.2 81.4 - 97.8 fL    MCH 30.7 27.0 - 33.0 pg    MCHC 34.9 33.6 - 35.0 g/dL    RDW 42.7 35.9 - 50.0 fL    Platelet Count 165 164 - 446 K/uL    MPV 10.4 9.0 - 12.9 fL    Neutrophils-Polys 71.20 44.00 - 72.00 %    Lymphocytes 20.90 (L) 22.00 - 41.00 %    Monocytes 6.40 0.00 - 13.40 %    Eosinophils 0.60 0.00 - 6.90 %    Basophils 0.40 0.00 - 1.80 %    Immature Granulocytes 0.50 0.00 - 0.90 %    Nucleated RBC 0.00 /100 WBC    Neutrophils (Absolute) 7.03 2.00 - 7.15 K/uL    Lymphs (Absolute) 2.06 1.00 - 4.80 K/uL    Monos (Absolute) 0.63 0.00 - 0.85 K/uL    Eos (Absolute) 0.06 0.00 - 0.51 K/uL    Baso (Absolute) 0.04 0.00 - 0.12 K/uL    Immature Granulocytes (abs) 0.05 0.00 - 0.11 K/uL    NRBC (Absolute) 0.00 K/uL   HOLD BLOOD BANK SPECIMEN (NOT TESTED)    Collection Time: 23  7:50 AM   Result Value Ref Range    Holding Tube - Bb DONE         Assessment:   Divine Elise at 39w0d, hx of previous  section with T extension, desires repeat, declines BTL  Labor status: Not in labor.    Discussed with the patient indications for  delivery. The patient voiced understanding of indications for  section at this time.    Discussed  with the patient the risks of  delivery. The risks include bleeding, infection, transfusion, emergency hysterectomy to control bleeding, damage to surrounding organs (bowel, bladder, ureters, nerves, vessels), need for repair or future surgery, fetal injury, unexpected pathology, anesthesia risks, and rarely death.  I also discussed with the patient the risk of wound infection, wound breakdown, and scarring. We discussed that these risks are greater in people that have diabetes or obesity.    The patient had the opportunity to ask questions regarding the procedure. All questions answered to the patient's satisfaction. Plan to proceed with  delivery.          Obstetrical history significant for   Patient Active Problem List    Diagnosis Date Noted    Labor and delivery indication for care or intervention 2023    Rh negative state in antepartum period 2022    Supervision of high risk pregnancy in third trimester 2022    History of  delivery - desires TOLAC 2022    Vaginal discharge 03/15/2022    LGSIL of cervix of undetermined significance 03/15/2022    LGSIL on Pap smear of cervix - f/u PAP 1 yr 2020   .      Plan:     Admit to L&D  GBS positive  Repeat  Section

## 2023-01-30 NOTE — PROGRESS NOTES
1230- pt to floor. Oriented to room and policies. Plan to strictly bottle feed. POC reviewed including feeding intervals and volumes, I+O documentation, lochia changes, ambulation, infant temperature management including STS and layering, weights, VS intervals, and discharge planning. Medications and other comfort measures discussed. Call light in reach. , SCDs, and martines in place.

## 2023-01-30 NOTE — ANESTHESIA TIME REPORT
Anesthesia Start and Stop Event Times     Date Time Event    1/30/2023 0900 Ready for Procedure     0938 Anesthesia Start     1059 Anesthesia Stop        Responsible Staff  01/30/23    Name Role Begin End    Дмитрий Schmidt M.D. Anesth 0938 1059        Overtime Reason:  no overtime (within assigned shift)    Comments:

## 2023-01-30 NOTE — ANESTHESIA PROCEDURE NOTES
Spinal Block    Date/Time: 1/30/2023 9:43 AM  Performed by: Дмитрий Schmidt M.D.  Authorized by: Дмитрий Schmidt M.D.     Patient Location:  OR  Start Time:  1/30/2023 9:43 AM  End Time:  1/30/2023 9:53 AM  Reason for Block: primary anesthetic    patient identified, IV checked, site marked, risks and benefits discussed, surgical consent, monitors and equipment checked, pre-op evaluation and timeout performed    Patient Position:  Sitting  Prep: ChloraPrep, patient draped and sterile technique    Monitoring:  Blood pressure, continuous pulse oximetry and heart rate  Approach:  Midline  Location:  L4-5  Injection Technique:  Single-shot  Skin infiltration:  Lidocaine  Strength:  1%  Dose:  3ml  Needle Type:  Pencan  Needle Gauge:  25 G  CSF flowing pre/post injection:  Yes  Sensory Level:  T6   Difficult positioning, change to CSE tech, easy x 1

## 2023-01-30 NOTE — ANESTHESIA PREPROCEDURE EVALUATION
Case: [id number] Date/Time: 2315    Procedure: REPEAT  SECTION (Abdomen)    Pre-op diagnosis: PREVIOUS  SECTION-39 WEEKS    Location: [facility] OR 01 / [facility] LABOR [facility] DELIVERY    Surgeons: Radha Jaffe M.D.          Relevant Problems   ANESTHESIA (within normal limits)      PULMONARY (within normal limits)      NEURO (within normal limits)      CARDIAC (within normal limits)      GI (within normal limits)       (within normal limits)      ENDO (within normal limits)      OB (within normal limits)       Physical Exam    Airway   Mallampati: II  TM distance: >3 FB  Neck ROM: full       Cardiovascular - normal exam  Rhythm: regular  Rate: normal  (-) murmur     Dental - normal exam           Pulmonary - normal exam  Breath sounds clear to auscultation     Abdominal    Neurological - normal exam                 Anesthesia Plan    ASA 2       Plan - spinal   Neuraxial block will be primary anesthetic                Postoperative Plan: Postoperative administration of opioids is intended.    Pertinent diagnostic labs and testing reviewed    Informed Consent:    Anesthetic plan and risks discussed with patient.

## 2023-01-30 NOTE — OP REPORT
PREOPERATIVE DIAGNOSIS:   IUP @ 39+0/7 weeks   Hx of previous C/S, desires repeat    POSTOPERATIVE DIAGNOSIS:  Same    PROCEDURE: Repeat Low Transverse  Section via Pfannensteil    SURGEON: Radha Jaffe MD    ASSISTANT: Nel Claros MD    ANESTHESIA: spinal    COMPLICATIONS: none    EBL:  800 cc    FLUIDS: 1500 cc LR    URINE OUTPUT: 250 cc    INDICATIONS: 22-year-old -1-0-1 at 39-0/7 weeks with history of previous  section, desires repeat.    FINDINGS: Viable male infant in cephalic presentation with clear fluid, apgars 7 and 7, weight 3250 grams.  Uterus with 5 cm x 2 cm window in the lower uterine segment.  No evidence of uterine rupture.  Normal tubes and ovaries.    PROCEDURE IN DETAIL: The patient was taken to the operating room where spinal anesthesia was found to be adequate.  She was then prepped and draped in the normal sterile fashion in the dorsal supine position with a leftward hip tilt.  A Pfannenstiel skin incision was then made with the scalpel and carried through to the underlying layer of fascia, which was nicked in the midline and the incision was extended laterally with the Jane scissors.  The superior aspect of the fascial incision was then grasped with Kocher clamps, elevated, and the underlying rectus muscles dissected off sharply.  Attention was then turned to the inferior aspect of this incision which, in a similar fashion, was grasped, tented up with Kocher clamps, and the rectus muscle was dissected off sharply.  The rectus muscles were then  in the midline, and the peritoneum was identified, tented up, and entered sharply with the Metzenbaum scissors.  The peritoneal incision was then extended superiorly and inferiorly with good visualization of the bladder.  The bladder blade was then inserted and the vesicouterine peritoneum identified, grasped with pickups, and entered sharply with the Metzenbaum scissors.  This incision was then extended laterally and  the bladder flap was created digitally.  The bladder blade was then reinserted and the lower uterine segment incised in a transverse fashion with the scalpel.  The uterine incision was then extended laterally with fingers.  The bladder blade was removed and the infant's head was delivered atraumatically.  The remainder of the infant was delivered without difficulty.  The infant's nose and mouth were bulb suctioned on the field.  The cord was clamped and cut after approximately 30 seconds.  The infant was then handed off to the awaiting attendant.  Cord gases were obtained..  The placenta was then removed manually, the uterus exteriorized, and cleared of all clot and debris.  The uterine incision was repaired with 1-0 chromic in a running, locked fashion.  A second layer of the same suture was used to obtain excellent hemostasis.  Additional figure-of-eight's were placed at the left margin of the incision due to large feeding vessels that continued to bleed.  Hemostasis was obtained.  Surgicel powder was applied.  The uterus was returned to the abdomen and the gutters were cleared of all clot and debris.  The fascia was approximated with 0 Vicryl in a running fashion.  The subcuticular fat was irrigated.  Naina's fascia was closed with interrupted sutures of 2-0 Vicryl.  The skin was with 4-0 Monocryl in a subcuticular fashion.  Benzoin and Steri-Strips were applied.  A Mepilex dressing was placed.  The patient tolerated the procedure well.  Sponge lap and needle counts were correct x3.  The patient was taken to the recovery room in stable condition.

## 2023-01-31 PROCEDURE — A9270 NON-COVERED ITEM OR SERVICE: HCPCS | Performed by: ANESTHESIOLOGY

## 2023-01-31 PROCEDURE — 770002 HCHG ROOM/CARE - OB PRIVATE (112)

## 2023-01-31 PROCEDURE — 700102 HCHG RX REV CODE 250 W/ 637 OVERRIDE(OP): Performed by: STUDENT IN AN ORGANIZED HEALTH CARE EDUCATION/TRAINING PROGRAM

## 2023-01-31 PROCEDURE — A9270 NON-COVERED ITEM OR SERVICE: HCPCS | Performed by: STUDENT IN AN ORGANIZED HEALTH CARE EDUCATION/TRAINING PROGRAM

## 2023-01-31 PROCEDURE — 700102 HCHG RX REV CODE 250 W/ 637 OVERRIDE(OP): Performed by: ANESTHESIOLOGY

## 2023-01-31 PROCEDURE — 700111 HCHG RX REV CODE 636 W/ 250 OVERRIDE (IP): Performed by: ANESTHESIOLOGY

## 2023-01-31 RX ADMIN — ACETAMINOPHEN 1000 MG: 500 TABLET, FILM COATED ORAL at 06:09

## 2023-01-31 RX ADMIN — IBUPROFEN 800 MG: 800 TABLET, FILM COATED ORAL at 21:52

## 2023-01-31 RX ADMIN — OXYCODONE HYDROCHLORIDE 5 MG: 5 TABLET ORAL at 20:16

## 2023-01-31 RX ADMIN — ACETAMINOPHEN 1000 MG: 500 TABLET, FILM COATED ORAL at 17:37

## 2023-01-31 RX ADMIN — OXYCODONE HYDROCHLORIDE 5 MG: 5 TABLET ORAL at 05:03

## 2023-01-31 RX ADMIN — KETOROLAC TROMETHAMINE 30 MG: 30 INJECTION, SOLUTION INTRAMUSCULAR at 10:23

## 2023-01-31 RX ADMIN — KETOROLAC TROMETHAMINE 30 MG: 30 INJECTION, SOLUTION INTRAMUSCULAR at 04:36

## 2023-01-31 RX ADMIN — ACETAMINOPHEN 1000 MG: 500 TABLET, FILM COATED ORAL at 12:24

## 2023-01-31 RX ADMIN — IBUPROFEN 800 MG: 800 TABLET, FILM COATED ORAL at 16:25

## 2023-01-31 RX ADMIN — PRENATAL WITH FERROUS FUM AND FOLIC ACID 1 TABLET: 3080; 920; 120; 400; 22; 1.84; 3; 20; 10; 1; 12; 200; 27; 25; 2 TABLET ORAL at 08:45

## 2023-01-31 ASSESSMENT — PAIN DESCRIPTION - PAIN TYPE
TYPE: ACUTE PAIN;SURGICAL PAIN
TYPE: ACUTE PAIN;SURGICAL PAIN

## 2023-01-31 NOTE — PROGRESS NOTES
Post Partum Progress Note    Name:   Divine Elise   Date/Time:  2023 - 6:39 AM  Chief Admitting Dx:  Labor and delivery indication for care or intervention [O75.9]  Delivery Type:   for repeat  Post-Op/Post Partum Days #:  1    Subjective:  Abdominal pain: yes  Ambulating:   yes  Tolerating liquids:  yes  Tolerating food:  yes common adult  Flatus:   yes  BM:    no  Bleeding:   with a small amount of bleeding  Voiding:   yes  Dizziness:   no  Feeding:   bottle -  Vitals:    23 2350 23 0037 23 0130 23 0200   BP:    118/63   Pulse: 65 66 65 60   Resp: 20 18 16 16   Temp:    36.2 °C (97.2 °F)   TempSrc:    Temporal   SpO2: 93% 95% 95% 95%   Weight:       Height:           Exam:  Breast: Lactating no  Abdomen: Abdomen soft, non-tender. BS normal. No masses,  No organomegaly  Fundal Tenderness:  no  Fundus Firm: yes  Incision:  Mepilex dressing dry and intact  Below umbilicus: yes  Perineum: perineum intact  Lochia: mild  Extremities: no edema, calves NT    Meds:  Current Facility-Administered Medications   Medication Dose    lactated ringers (LR) infusion      LR infusion      oxytocin (PITOCIN) infusion (for post delivery)  1,000 mL    Followed by    oxytocin (Pitocin) infusion (for postpartum)  125 mL/hr    oxytocin (PITOCIN) injection 10 Units  10 Units    ketorolac (TORADOL) injection 30 mg  30 mg    oxyCODONE immediate-release (ROXICODONE) tablet 5 mg  5 mg    oxyCODONE immediate release (ROXICODONE) tablet 10 mg  10 mg    HYDROmorphone (Dilaudid) injection 0.2 mg  0.2 mg    ondansetron (ZOFRAN) syringe/vial injection 4 mg  4 mg    diphenhydrAMINE (BENADRYL) injection 12.5 mg  12.5 mg    diphenhydrAMINE (BENADRYL) injection 12.5 mg  12.5 mg    Or    diphenhydrAMINE (BENADRYL) injection 25 mg  25 mg    Or    naloxone HCl (NARCAN) 20 mg in  mL infusion  0.4 mg/hr    lactated ringers infusion      ibuprofen (MOTRIN) tablet 800 mg  800 mg    Followed by    [START ON  2/3/2023] ibuprofen (MOTRIN) tablet 800 mg  800 mg    acetaminophen (TYLENOL) tablet 1,000 mg  1,000 mg    Followed by    [START ON 2/3/2023] acetaminophen (TYLENOL) tablet 1,000 mg  1,000 mg    oxyCODONE immediate-release (ROXICODONE) tablet 5 mg  5 mg    oxyCODONE immediate release (ROXICODONE) tablet 10 mg  10 mg    ondansetron (ZOFRAN) syringe/vial injection 4 mg  4 mg    Or    ondansetron (ZOFRAN ODT) dispertab 4 mg  4 mg    diphenhydrAMINE (BENADRYL) tablet/capsule 25 mg  25 mg    Or    diphenhydrAMINE (BENADRYL) injection 25 mg  25 mg    docusate sodium (COLACE) capsule 100 mg  100 mg    Rho D Immune Globulin (RHOPHYLAC) injection  1,500 Units    magnesium hydroxide (MILK OF MAGNESIA) suspension 30 mL  30 mL    prenatal plus vitamin (STUARTNATAL 1+1) 27-1 MG tablet 1 Tablet  1 Tablet    simethicone (Mylicon) chewable tablet 125 mg  125 mg    calcium carbonate (Tums) chewable tab 1,000 mg  1,000 mg       Labs:   Recent Labs     23  0750 23   WBC 9.9 16.0*   RBC 4.23 4.05*   HEMOGLOBIN 13.0 12.5   HEMATOCRIT 37.3 35.7*   MCV 88.2 88.1   MCH 30.7 30.9   MCHC 34.9 35.0   RDW 42.7 43.6   PLATELETCT 165 201   MPV 10.4 10.5       Assessment:  Chief Admitting Dx:  Labor and delivery indication for care or intervention [O75.9]  Delivery Type:   for repeat  Tubal Ligation:  no    Plan:  Continue routine post partum care.      Radha Jaffe M.D.

## 2023-01-31 NOTE — DISCHARGE PLANNING
Discharge Planning Assessment Post Partum    Reason for Referral: Hx of drug use- THC  Address: [address] [place] [address] [address] [facility] [address]  Type of Living Situation:Stable with family  Mom Diagnosis: Pregnancy  Baby Diagnosis: - 39 weeks  Primary Language: English    Name of Baby: Brian Elise (: 23)  Father of the Baby: Raheem Perez (: 99)  Involved in baby’s care? Yes, at bedside  Contact Information: [phone]    Prenatal Care: Yes  Mom's PCP: Pt stated that she is currently looking for one.   PCP for new baby: Pt stated that she doesn't remember the PCP's name. SW Intern asked if she would like pediatrician list and pt agreed and accepted.    Support System: Pt stated she has a good support system. FOB was at bedside.  Coping/Bonding between mother & baby: MOB bonding with baby at bedside.  Source of Feeding: Strictly bottle feeding  Supplies for Infant: Pt stated she has all supplies.    Mom's Insurance: Medicaid FFS  Baby Covered on Insurance:Yes, baby is covered.  Mother Employed/School: Unemployed  Other children in the home/names & ages: Jef Elise (2-:20)    Financial Hardship/Income: None  Mom's Mental status: Alert and oriented   Services used prior to admit: Medicaid    CPS History: Report called in to [facility] DCFS.  Psychiatric History: None  Domestic Violence History: None  Drug/ETOH History: Hx of THC with 1st pregnancy and pregnancy with current baby. Baby's UDS is positive for marijuana. SW Intern spoke with pt about THC use during pregnancy and how THC can transfer through breast milk. Pt stated she is strictly bottle feeding and will continue to use THC. FOB stated that MOB needed THC to gain an appetite, to sleep, and for pain because tylenol wasn't working for her.     Resources Provided: Provided postpartum, WIC, diaper bank referral, and children & family resources. Pt accepted all.  Referrals Made: Report called into DCFS.      Clearance for Discharge: Baby is safe to discharge once medically cleared.    Ongoing plan: SW Intern reported to CPS about THC use during pregnancy.Report is information only. Infant cleared to discharge with parents once medically cleared.  Reviewed and approved by: CHIQUIS Mckeon

## 2023-01-31 NOTE — CARE PLAN
The patient is Stable - Low risk of patient condition declining or worsening    Shift Goals  Clinical Goals: firm fundus, pain control, mobility  Patient Goals: rest  Family Goals: bond    Progress made toward(s) clinical / shift goals:  rest     Patient is not progressing towards the following goals:

## 2023-01-31 NOTE — PROGRESS NOTES
Pt a&o 4, VSS, assessment completed. Resting comfortably in bed with call light, bedside table in reach. No c/o at this time. Side rails up 2. Instructed to use call light when needing assistance verbalized understanding. Bed alarm n/a, bed in low position. Will continue to monitor.

## 2023-01-31 NOTE — CARE PLAN
The patient is Stable - Low risk of patient condition declining or worsening    Shift Goals  Clinical Goals: Pain control, fundus firm with light lochia  Patient Goals:   Family Goals:    Progress made toward(s) clinical / shift goals: Patient receiving scheduled tylenol and toradol. Patient will call for stronger medication.     Patient is not progressing towards the following goals: Fundus firm with light lochia. Patient did pass blood clot. Educated to call if patient keeps passing blood clots.

## 2023-01-31 NOTE — PROGRESS NOTES
Received bedside report from STEPHANIE Aparicio. Patient declines pain at this time. Receiving scheduled tylenol and toradol as ordered, will call if needing something stronger. Patient on pulse oxconrad DC'd around 1730 she knows she needs to void within 6 hrs. Whiteboards updated, POC discussed. Call light within reach. Patient encouraged to call with any needs and or concerns.

## 2023-02-01 ENCOUNTER — PHARMACY VISIT (OUTPATIENT)
Dept: PHARMACY | Facility: MEDICAL CENTER | Age: 23
End: 2023-02-01
Payer: COMMERCIAL

## 2023-02-01 VITALS
HEIGHT: 65 IN | OXYGEN SATURATION: 99 % | RESPIRATION RATE: 17 BRPM | WEIGHT: 218 LBS | SYSTOLIC BLOOD PRESSURE: 110 MMHG | HEART RATE: 74 BPM | DIASTOLIC BLOOD PRESSURE: 75 MMHG | TEMPERATURE: 97.9 F | BODY MASS INDEX: 36.32 KG/M2

## 2023-02-01 PROCEDURE — A9270 NON-COVERED ITEM OR SERVICE: HCPCS | Performed by: STUDENT IN AN ORGANIZED HEALTH CARE EDUCATION/TRAINING PROGRAM

## 2023-02-01 PROCEDURE — RXMED WILLOW AMBULATORY MEDICATION CHARGE

## 2023-02-01 PROCEDURE — 700102 HCHG RX REV CODE 250 W/ 637 OVERRIDE(OP): Performed by: STUDENT IN AN ORGANIZED HEALTH CARE EDUCATION/TRAINING PROGRAM

## 2023-02-01 RX ORDER — ACETAMINOPHEN 500 MG
1000 TABLET ORAL EVERY 6 HOURS
Qty: 30 TABLET | Refills: 0 | Status: SHIPPED | OUTPATIENT
Start: 2023-02-01

## 2023-02-01 RX ORDER — OXYCODONE HYDROCHLORIDE 5 MG/1
5 TABLET ORAL EVERY 4 HOURS PRN
Qty: 10 TABLET | Refills: 0 | Status: SHIPPED | OUTPATIENT
Start: 2023-02-01 | End: 2023-02-04

## 2023-02-01 RX ORDER — IBUPROFEN 800 MG/1
800 TABLET ORAL EVERY 8 HOURS
Qty: 30 TABLET | Refills: 0 | Status: SHIPPED | OUTPATIENT
Start: 2023-02-01

## 2023-02-01 RX ADMIN — PRENATAL WITH FERROUS FUM AND FOLIC ACID 1 TABLET: 3080; 920; 120; 400; 22; 1.84; 3; 20; 10; 1; 12; 200; 27; 25; 2 TABLET ORAL at 07:55

## 2023-02-01 RX ADMIN — OXYCODONE HYDROCHLORIDE 10 MG: 10 TABLET ORAL at 00:20

## 2023-02-01 RX ADMIN — IBUPROFEN 800 MG: 800 TABLET, FILM COATED ORAL at 14:04

## 2023-02-01 RX ADMIN — OXYCODONE HYDROCHLORIDE 5 MG: 5 TABLET ORAL at 07:57

## 2023-02-01 RX ADMIN — IBUPROFEN 800 MG: 800 TABLET, FILM COATED ORAL at 05:34

## 2023-02-01 RX ADMIN — ACETAMINOPHEN 1000 MG: 500 TABLET, FILM COATED ORAL at 05:34

## 2023-02-01 RX ADMIN — ACETAMINOPHEN 1000 MG: 500 TABLET, FILM COATED ORAL at 12:23

## 2023-02-01 RX ADMIN — ACETAMINOPHEN 1000 MG: 500 TABLET, FILM COATED ORAL at 00:20

## 2023-02-01 ASSESSMENT — PAIN DESCRIPTION - PAIN TYPE: TYPE: SURGICAL PAIN

## 2023-02-01 NOTE — PROGRESS NOTES
Meds-to-Beds: Discharge prescription orders listed below delivered to patient's bedside. RN Michelle notified. Patient counseled.      Current Outpatient Medications   Medication Sig Dispense Refill    acetaminophen (TYLENOL) 500 MG Tab Take 2 Tablets by mouth every 6 hours. 30 Tablet 0    ibuprofen (MOTRIN) 800 MG Tab Take 1 Tablet by mouth every 8 hours. Indications: Joint Damage causing Pain and Loss of Function 30 Tablet 0    oxyCODONE immediate-release (ROXICODONE) 5 MG Tab Take 1 Tablet by mouth every four hours as needed for Severe Pain for up to 3 days. 10 Tablet 0      Richard Oneill, Pharmacy Intern

## 2023-02-01 NOTE — DISCHARGE INSTRUCTIONS
Pelvic rest x6 weeks  Return for follow up visit in 1 weeks.  Call or come to ED for: heavy vaginal bleeding, fever >100.4, severe abdominal pain, severe headache, chest pain, shortness of breath,  N/V, incisional drainage1, or other concerns.  PATIENT DISCHARGE EDUCATION INSTRUCTION SHEET    REASONS TO CALL YOUR OBSTETRICIAN  Persistent fever, shaking, chills (Temperature higher than 100.4) may indicate you have an infection  Heavy bleeding: soaking more than 1 pad per hour; Passing clots an egg-sized clot or bigger may mean you have an postpartum hemorrhage  Foul odor from vagina or bad smelling or discolored discharge or blood  Breast infection (Mastitis symptoms); breast pain, chills, fever, redness or red streaks, may feel flu like symptoms  Urinary pain, burning or frequency  Incision that is not healing, increased redness, swelling, tenderness or pain, or any pus from episiotomy or  site may mean you have an infection  Redness, swelling, warmth, or painful to touch in the calf area of your leg may mean you have a blood clot  Severe or intensified depression, thoughts or feelings of wanting to hurt yourself or someone else   Pain in chest, obstructed breathing or shortness of breath (trouble catching your breath) may mean you are having a postpartum complication. Call your provider immediately   Headache that does not get better, even after taking medicine, a bad headache with vision changes or pain in the upper right area of your belly may mean you have high blood pressure or post birth preeclampsia. Call your provider immediately    HAND WASHING  All family and friends should wash their hands:  Before and after holding the baby  Before feeding the baby  After using the restroom or changing the baby's diaper    WOUND CARE  Ask your physician for additional care instructions. In general:   Incision:  May shower and pat incision dry   Keep the incision clean and dry  There should not be any  opening or pus from the incision  Continue to walk at home 3 times a day   Do NOT lift anything heavier than your baby (over 10 pounds)  Encourage family to help participate in care of the  to allow rest and mom time to heal    VAGINAL CARE AND BLEEDING  Nothing inside vagina for 6 weeks:   No sexual intercourse, tampons or douching  Bleeding may continue for 2-4 weeks. Amount and color may vary  Soaking 1 pad or more in an hour for several hours is considered heavy bleeding  Passing large egg sized blood clots can be concerning  If you feel like you have heavy bleeding or are having increasing amount of blood clots call your Obstetrician immediately  If you begin feeling faint upon standing, feeling sick to your stomach, have clammy skin, a really fast heartbeat, have chills, start feeling confused, dizzy, sleepy or weak, or feeling like you're going to faint call your Obstetrician immediately    HYPERTENSION   Preeclampsia or gestational hypertension are types of high blood pressure that only pregnant women can get. It is important for you to be aware of symptoms to seek early intervention and treatment. If you have any of these symptoms immediately call your Obstetrician    Vision changes or blurred vision   Severe headache or pain that is unrelieved with medication and will not go away  Persistent pain in upper abdomen or shoulder   Increased swelling of face, feet, or hands  Difficulty breathing or shortness of breath at rest  Urinating less than usual    URINATION AND BOWEL MOVEMENTS  Eating more fiber (bran cereal, fruits, and vegetables) and drinking plenty of fluids will help to avoid constipation  Urinary frequency and urgency after childbirth is normal  If you experience any urinary pain, burning or frequency call your provider    BIRTH CONTROL  It is possible to become pregnant at any time after delivery and while breastfeeding  Plan to discuss a method of birth control with your physician at your  "post delivery follow up visit    POSTPARTUM BLUES  During the first few days after birth, you may experience a sense of the \"blues\" which may include impatience, irritability or even crying. These feelings come and go quickly. However, as many as 1 in 10 women experience emotional symptoms known as postpartum depression.     POSTPARTUM DEPRESSION    May start as early as the second or third day after delivery or take several weeks or months to develop. Symptoms of \"blues\" are present, but are more intense: Crying spells; loss of appetite; feelings of hopelessness or loss of control; fear of touching the baby; over concern or no concern at all about the baby; little or no concern about your own appearance/caring for yourself; and/or inability to sleep or excessive sleeping. Contact your Obstetrician if you are experiencing any of these symptoms     PREVENTING SHAKEN BABY  If you are angry or stressed, PUT THE BABY IN THE CRIB, step away, take some deep breaths, and wait until you are calm to care for the baby. DO NOT SHAKE THE BABY. You are not alone, call a supporter for help.  [facility] 24/7 crisis call line (599-134-9020) or (1-833.859.1672)  You can also text them, text \"ANSWER\" (945697)  "

## 2023-02-01 NOTE — DISCHARGE SUMMARY
Discharge Summary:     Date of Admission: 2023  Date of Discharge: 23      Admitting diagnosis:    1. Pregnancy @ 39w0d      Discharge Diagnosis:   1. Status post  for repeat.      Past Medical History:   Diagnosis Date    Head ache     Pregnant     Rh negative status during pregnancy 2020     OB History    Para Term  AB Living   2 2 1 1   2   SAB IAB Ectopic Molar Multiple Live Births           0 2      # Outcome Date GA Lbr Noel/2nd Weight Sex Delivery Anes PTL Lv   2 Term 23 39w0d  3.25 kg (7 lb 2.6 oz) M CS-LTranv Spinal N LAURA   1  20 25w6d  0.887 kg (1 lb 15.3 oz) M CS-LTranv Spinal Y LAURA     Past Surgical History:   Procedure Laterality Date    REPEAT C SECTION N/A 2023    Procedure: REPEAT  SECTION;  Surgeon: Radha Jaffe M.D.;  Location: SURGERY LABOR AND DELIVERY;  Service: Obstetrics    CT  DELIVERY ONLY N/A 2020    Procedure:  SECTION, PRIMARY;  Surgeon: Mairsol Cabrera D.O.;  Location: LABOR AND DELIVERY;  Service: Obstetrics    DENTAL EXTRACTION(S)      PRIMARY C SECTION      TONSILLECTOMY       Patient has no known allergies.    Patient Active Problem List   Diagnosis    LGSIL on Pap smear of cervix - f/u PAP 1 yr    Vaginal discharge    LGSIL of cervix of undetermined significance    Supervision of high risk pregnancy in third trimester    History of  delivery - desires TOLAC    Rh negative state in antepartum period    Labor and delivery indication for care or intervention       Hospital Course:   Pt is a 22 y.o. now  who presented on 2023 for repeat . Spinal anesthesia was utilized with good effect on pain. Single male infant was delivered via repeat  at 1015. Apgars 7, 7 at 1 and 5 minutes respectively.  ml.     Postpartum course notable for early ambulation, well managed pain, tolerance of diet, spontaneous voiding, and appropriate feeding of infant. She has  remained afebrile and blood pressure has been well controlled. All maternal questions and concerns addressed    Physical Exam:  Temp:  [36.2 °C (97.1 °F)-36.6 °C (97.9 °F)] 36.6 °C (97.9 °F)  Pulse:  [72-74] 74  Resp:  [17] 17  BP: (110-112)/(72-75) 110/75  SpO2:  [99 %] 99 %  Physical Exam  General: well appearing, no apparent distress  Abdomen: soft, nontender, nondistended  Fundus: firm at level below umbilicus  Incision: dressing clean, dry, intact and no significant drainage  Perineum: Deferred  Extremities: symmetric, no peripheral edema, calves nontender    Current Facility-Administered Medications   Medication Dose    lactated ringers (LR) infusion      oxytocin (Pitocin) infusion (for postpartum)  125 mL/hr    oxytocin (PITOCIN) injection 10 Units  10 Units    lactated ringers infusion      ibuprofen (MOTRIN) tablet 800 mg  800 mg    Followed by    [START ON 2/3/2023] ibuprofen (MOTRIN) tablet 800 mg  800 mg    acetaminophen (TYLENOL) tablet 1,000 mg  1,000 mg    Followed by    [START ON 2/3/2023] acetaminophen (TYLENOL) tablet 1,000 mg  1,000 mg    oxyCODONE immediate-release (ROXICODONE) tablet 5 mg  5 mg    oxyCODONE immediate release (ROXICODONE) tablet 10 mg  10 mg    ondansetron (ZOFRAN) syringe/vial injection 4 mg  4 mg    Or    ondansetron (ZOFRAN ODT) dispertab 4 mg  4 mg    diphenhydrAMINE (BENADRYL) tablet/capsule 25 mg  25 mg    Or    diphenhydrAMINE (BENADRYL) injection 25 mg  25 mg    docusate sodium (COLACE) capsule 100 mg  100 mg    magnesium hydroxide (MILK OF MAGNESIA) suspension 30 mL  30 mL    prenatal plus vitamin (STUARTNATAL 1+1) 27-1 MG tablet 1 Tablet  1 Tablet    simethicone (Mylicon) chewable tablet 125 mg  125 mg    calcium carbonate (Tums) chewable tab 1,000 mg  1,000 mg       Recent Labs     01/30/23  0750 01/30/23 2056   WBC 9.9 16.0*   RBC 4.23 4.05*   HEMOGLOBIN 13.0 12.5   HEMATOCRIT 37.3 35.7*   MCV 88.2 88.1   MCH 30.7 30.9   MCHC 34.9 35.0   RDW 42.7 43.6   PLATELETCT  165 201   MPV 10.4 10.5         Activity/ Discharge Instructions::   Discharge to home  Pelvic Rest x 6 weeks  No heavy lifting x4 weeks  Call or come to ED for: heavy vaginal bleeding, fever >100.4, severe abdominal pain, severe headache, chest pain, shortness of breath,  N/V, incisional drainage, or other concerns.       Follow up:  [facility] in 1 week for incision check for  delivery.     Discharge Meds:   Current Outpatient Medications   Medication Sig Dispense Refill    acetaminophen (TYLENOL) 500 MG Tab Take 2 Tablets by mouth every 6 hours. 30 Tablet 0    ibuprofen (MOTRIN) 800 MG Tab Take 1 Tablet by mouth every 8 hours. Indications: Joint Damage causing Pain and Loss of Function 30 Tablet 0    oxyCODONE immediate-release (ROXICODONE) 5 MG Tab Take 1 Tablet by mouth every four hours as needed for Severe Pain for up to 3 days. 10 Tablet 0           Lianne Doyle M.D. PGY-1

## 2023-02-01 NOTE — PROGRESS NOTES
Discharge instructions discussed. Emphasized the importance of  screening follow-up test. Questions and concerns have been answered. Baby's ID band matches with patient and FOB. Patient and infant are both doing well.

## 2023-02-01 NOTE — CARE PLAN
The patient is Stable - Low risk of patient condition declining or worsening    Shift Goals  Clinical Goals: vitals stable, bonding with infant, no bleeding  Patient Goals: rest, home soon  Family Goals: supportive care    Progress made toward(s) clinical / shift goals:    Complaints of abdominal pain. Medicated with scheduled Tylenol and Motrin. PRN oxycodone and heat pack given as well. Reports pain much better managed now. Resting and calm.    Infant dressed appropriately. Bonding with FOB and infant in room.    Patient is not progressing towards the following goals:      Problem: Knowledge Deficit - Postpartum  Goal: Patient will verbalize and demonstrate understanding of self and infant care  Outcome: Progressing  Note: Bonding with infant in room.     Problem: Altered Physiologic Condition  Goal: Patient physiologically stable as evidenced by normal lochia, palpable uterine involution and vitals within normal limits  Outcome: Progressing  Note: Fundus firm, lochia light. Vitals WNL.

## 2023-02-13 ENCOUNTER — APPOINTMENT (OUTPATIENT)
Dept: OBGYN | Facility: CLINIC | Age: 23
End: 2023-02-13
Payer: MEDICAID

## 2023-02-16 ENCOUNTER — POST PARTUM (OUTPATIENT)
Dept: OBGYN | Facility: CLINIC | Age: 23
End: 2023-02-16
Payer: MEDICAID

## 2023-02-16 VITALS — BODY MASS INDEX: 34.45 KG/M2 | WEIGHT: 207 LBS | SYSTOLIC BLOOD PRESSURE: 136 MMHG | DIASTOLIC BLOOD PRESSURE: 95 MMHG

## 2023-02-16 DIAGNOSIS — Z48.89 ENCOUNTER FOR POSTOPERATIVE CARE: ICD-10-CM

## 2023-02-16 PROCEDURE — 99024 POSTOP FOLLOW-UP VISIT: CPT | Performed by: OBSTETRICS & GYNECOLOGY

## 2023-02-16 ASSESSMENT — FIBROSIS 4 INDEX: FIB4 SCORE: 0.61

## 2023-02-16 NOTE — PROGRESS NOTES
Postoperative Follow Up Visit    Divine Elise is a 22 y.o. female    Chief complaint    No chief complaint on file.      S/p RCS on 23 for history of prior  section presenting for postop check    COMPLAINTS:    No fevers/chills. No N/V, constipation/diarrhea or heavy vaginal bleeding.   Pain well controlled. Baby doing well.       OBJECTIVE:    BP (!) 136/95   Wt 207 lb   LMP 2022 (Approximate)   BMI 34.45 kg/m²   Repeat BP: 136/95    General: No acute distress, well nourished, alert.     ABDOMEN: Soft nondistended, nontender, no peritoneal signs, no masses.     INCISION: Dressing and steri strips removed;  Clean, dry, intact, no drainage, erythema or separation.    A/P    1. Encounter for postoperative care        S/p RCS on 23 for history of prior , doing well.     Follow up in 4 weeks for final postpartum visit.     Aubree Torre M.D.    Obstetrics and Gynecology    :49 AM

## 2023-02-22 NOTE — ED NOTES
Pt is doing well.  Able to drink water and retain.                           lov 01/18/2023  traZODone (DESYREL) 50 MG tablet 90 tablet 3 8/23/2021

## 2023-05-12 NOTE — PROGRESS NOTES
29 y.o. female  at 35w2d   Reports + FM, denies VB, LOF or regular CTX  Doing well without concerns. Voices no complaints.   TWG: 3.5 lbs     Hepatitis C virus infection without hepatic coma              -Needs to see hepatology, referral placed earlier in pregnancy  Previous deep vein thrombosis (DVT) affecting pregnancy in third trimester              -Seeing hem/onc              -Lovenox as ordered  History of  delivery, currently pregnant              -For repeat C/S, scheduled   Maternal red cell alloimmunization, antepartum              -Antibody titers, no risk for fetal anemia  Poor fetal growth affecting management of mother in third trimester              -Seeing MFM               -US today: NARGIS BREECH, BPP 6/8 (0 for fetal breathing movements), SANTOSH wnl, MVP 3.8cm, SD ratios elevated at >99%, diastolic flow is noted- to hospital for NST and further evaluation. ANAND Kirkland notified.   -Last growth on 23 EFW 1%, AC <1% 1,106 g , 2lb 7oz,     Abnormal fetal ultrasound              -Seeing MFM, Amnio done 3/14/23              -Short long bones              -Saw genetics 3/7/23              -amnio normal for skeletal dysplasia    Bipolar 1 disorder              -Seroquel nightly    History of drug abuse in remission              -UDS negative    Recurrent major depressive disorder              -No meds currently    Reviewed warning signs, normal FKCs, labor precautions and how/when to call.  RTC as indicated if discharged from hospital, call or present sooner prn.       Assessment completed WDL. Pt medicated for incisional pain. Pt states that pain is well controlled with current pain medications. Plan of care discussed. Pt encouraged to call with needs.

## 2023-05-18 ENCOUNTER — HOSPITAL ENCOUNTER (OUTPATIENT)
Facility: MEDICAL CENTER | Age: 23
End: 2023-05-18
Attending: NURSE PRACTITIONER
Payer: MEDICAID

## 2023-05-18 ENCOUNTER — OFFICE VISIT (OUTPATIENT)
Dept: URGENT CARE | Facility: PHYSICIAN GROUP | Age: 23
End: 2023-05-18
Payer: MEDICAID

## 2023-05-18 VITALS
HEART RATE: 76 BPM | WEIGHT: 208 LBS | HEIGHT: 65 IN | SYSTOLIC BLOOD PRESSURE: 124 MMHG | BODY MASS INDEX: 34.66 KG/M2 | RESPIRATION RATE: 14 BRPM | TEMPERATURE: 97.7 F | OXYGEN SATURATION: 99 % | DIASTOLIC BLOOD PRESSURE: 78 MMHG

## 2023-05-18 DIAGNOSIS — N89.8 VAGINAL IRRITATION: ICD-10-CM

## 2023-05-18 DIAGNOSIS — R30.0 DYSURIA: ICD-10-CM

## 2023-05-18 LAB
APPEARANCE UR: CLEAR
BILIRUB UR STRIP-MCNC: NORMAL MG/DL
COLOR UR AUTO: YELLOW
GLUCOSE UR STRIP.AUTO-MCNC: NORMAL MG/DL
KETONES UR STRIP.AUTO-MCNC: NORMAL MG/DL
LEUKOCYTE ESTERASE UR QL STRIP.AUTO: NORMAL
NITRITE UR QL STRIP.AUTO: NORMAL
PH UR STRIP.AUTO: 7 [PH] (ref 5–8)
POCT INT CON NEG: NEGATIVE
POCT INT CON POS: POSITIVE
POCT URINE PREGNANCY TEST: NEGATIVE
PROT UR QL STRIP: NORMAL MG/DL
RBC UR QL AUTO: NORMAL
SP GR UR STRIP.AUTO: 1.01
UROBILINOGEN UR STRIP-MCNC: 0.2 MG/DL

## 2023-05-18 PROCEDURE — 87510 GARDNER VAG DNA DIR PROBE: CPT

## 2023-05-18 PROCEDURE — 87660 TRICHOMONAS VAGIN DIR PROBE: CPT

## 2023-05-18 PROCEDURE — 99213 OFFICE O/P EST LOW 20 MIN: CPT | Mod: 25 | Performed by: NURSE PRACTITIONER

## 2023-05-18 PROCEDURE — 81025 URINE PREGNANCY TEST: CPT | Performed by: NURSE PRACTITIONER

## 2023-05-18 PROCEDURE — 87480 CANDIDA DNA DIR PROBE: CPT

## 2023-05-18 PROCEDURE — 3074F SYST BP LT 130 MM HG: CPT | Performed by: NURSE PRACTITIONER

## 2023-05-18 PROCEDURE — 3078F DIAST BP <80 MM HG: CPT | Performed by: NURSE PRACTITIONER

## 2023-05-18 PROCEDURE — 87086 URINE CULTURE/COLONY COUNT: CPT

## 2023-05-18 PROCEDURE — 81002 URINALYSIS NONAUTO W/O SCOPE: CPT | Performed by: NURSE PRACTITIONER

## 2023-05-18 ASSESSMENT — ENCOUNTER SYMPTOMS: BACK PAIN: 1

## 2023-05-18 ASSESSMENT — FIBROSIS 4 INDEX: FIB4 SCORE: 0.61

## 2023-05-19 DIAGNOSIS — N89.8 VAGINAL IRRITATION: ICD-10-CM

## 2023-05-19 DIAGNOSIS — R30.0 DYSURIA: ICD-10-CM

## 2023-05-19 LAB
CANDIDA DNA VAG QL PROBE+SIG AMP: NEGATIVE
G VAGINALIS DNA VAG QL PROBE+SIG AMP: NEGATIVE
T VAGINALIS DNA VAG QL PROBE+SIG AMP: NEGATIVE

## 2023-05-19 NOTE — PROGRESS NOTES
Subjective:     Divine Elise is a 22 y.o. female who presents for Painful Urination (X 2 wks, pt states she just had a child about 3 months ago and the same thing happened with her last kid. She was told she had BV. ) and Back Pain (Lower )      Back Pain      Pt presents for evaluation of a new problem. Divine is a very pleasant 22-year-old female presents urgent care today with complaints of painful urination that has been ongoing for the past 2 weeks.  She does endorse dark-colored urine and abnormal vaginal discharge.  She denies any malodorous with vaginal discharge.  No concern for STI.  Negative for fever, chills, nausea/vomiting or diarrhea.  She is postpartum  she is not currently breast-feeding.  3 months.      Review of Systems   Musculoskeletal:  Positive for back pain.       PMH:   Past Medical History:   Diagnosis Date    Head ache     Pregnant     Rh negative status during pregnancy 2020     ALLERGIES: No Known Allergies  SURGHX:   Past Surgical History:   Procedure Laterality Date    REPEAT C SECTION N/A 2023    Procedure: REPEAT  SECTION;  Surgeon: Radha Jaffe M.D.;  Location: [facility];  Service: Obstetrics    [place]  DELIVERY ONLY N/A 2020    Procedure:  SECTION, PRIMARY;  Surgeon: Marisol Cabrera D.O.;  Location: LABOR AND DELIVERY;  Service: Obstetrics    DENTAL EXTRACTION(S)      PRIMARY C SECTION      TONSILLECTOMY       SOCHX:   Social History     Socioeconomic History    Marital status: Single   Tobacco Use    Smoking status: Never     Passive exposure: Never    Smokeless tobacco: Never   Vaping Use    Vaping Use: Never used   Substance and Sexual Activity    Alcohol use: Never    Drug use: Not Currently     Types: Inhaled     Comment: Marijuana daily    Sexual activity: Yes     Partners: Male     Birth control/protection: I.U.D.     Comment: IUD on 2020     FH:   Family History   Problem Relation Age of Onset    No  "Known Problems Mother     No Known Problems Father     Autism Brother     Seizures Brother     Cancer Maternal Aunt         Breast    Ovarian Cancer Maternal Aunt     Breast Cancer Maternal Aunt     Diabetes Maternal Grandfather     Hypertension Maternal Grandfather          Objective:   /78   Pulse 76   Temp 36.5 °C (97.7 °F) (Temporal)   Resp 14   Ht 1.651 m (5' 5\")   Wt 94.3 kg (208 lb)   SpO2 99%   BMI 34.61 kg/m²     Physical Exam  Vitals and nursing note reviewed.   Constitutional:       General: She is not in acute distress.     Appearance: Normal appearance. She is not ill-appearing.   HENT:      Head: Normocephalic and atraumatic.      Right Ear: External ear normal.      Left Ear: External ear normal.      Nose: No congestion or rhinorrhea.      Mouth/Throat:      Mouth: Mucous membranes are moist.   Eyes:      Extraocular Movements: Extraocular movements intact.      Pupils: Pupils are equal, round, and reactive to light.   Cardiovascular:      Rate and Rhythm: Normal rate and regular rhythm.      Pulses: Normal pulses.      Heart sounds: Normal heart sounds.   Pulmonary:      Effort: Pulmonary effort is normal.      Breath sounds: Normal breath sounds.   Abdominal:      General: Abdomen is flat. Bowel sounds are normal.      Palpations: Abdomen is soft.      Tenderness: There is no abdominal tenderness. There is no right CVA tenderness or left CVA tenderness.   Musculoskeletal:         General: Normal range of motion.      Cervical back: Normal range of motion and neck supple.   Skin:     General: Skin is warm and dry.      Capillary Refill: Capillary refill takes less than 2 seconds.   Neurological:      General: No focal deficit present.      Mental Status: She is alert and oriented to person, place, and time. Mental status is at baseline.   Psychiatric:         Mood and Affect: Mood normal.         Behavior: Behavior normal.         Thought Content: Thought content normal.         " Judgment: Judgment normal.       POCT urine: Trace leukocytes  POCT hCG:   Assessment/Plan:   Assessment    1. Dysuria  POCT Urinalysis    POCT Pregnancy    VAGINAL PATHOGENS DNA PANEL      2. Vaginal irritation  VAGINAL PATHOGENS DNA PANEL         Urine culture sent for evaluation of symptoms.  We discussed empiric treatment however, due to lack symptoms will wait for culture results before treating.  She is in agreement with this plan of care.  AVS handout given and reviewed with patient. Pt educated on red flags and when to seek treatment back in ER or UC.

## 2023-05-21 LAB
BACTERIA UR CULT: NORMAL
SIGNIFICANT IND 70042: NORMAL
SITE SITE: NORMAL
SOURCE SOURCE: NORMAL

## 2023-05-26 ENCOUNTER — OFFICE VISIT (OUTPATIENT)
Dept: URGENT CARE | Facility: PHYSICIAN GROUP | Age: 23
End: 2023-05-26
Payer: MEDICAID

## 2023-05-26 ENCOUNTER — HOSPITAL ENCOUNTER (OUTPATIENT)
Facility: MEDICAL CENTER | Age: 23
End: 2023-05-26
Attending: FAMILY MEDICINE
Payer: MEDICAID

## 2023-05-26 VITALS
RESPIRATION RATE: 14 BRPM | TEMPERATURE: 97.9 F | DIASTOLIC BLOOD PRESSURE: 76 MMHG | BODY MASS INDEX: 34.16 KG/M2 | HEART RATE: 98 BPM | OXYGEN SATURATION: 98 % | WEIGHT: 205 LBS | HEIGHT: 65 IN | SYSTOLIC BLOOD PRESSURE: 114 MMHG

## 2023-05-26 DIAGNOSIS — R30.0 DYSURIA: ICD-10-CM

## 2023-05-26 DIAGNOSIS — R10.2 PELVIC PAIN: ICD-10-CM

## 2023-05-26 DIAGNOSIS — N30.00 ACUTE CYSTITIS WITHOUT HEMATURIA: ICD-10-CM

## 2023-05-26 DIAGNOSIS — N89.8 VAGINAL IRRITATION: ICD-10-CM

## 2023-05-26 PROCEDURE — 81002 URINALYSIS NONAUTO W/O SCOPE: CPT | Performed by: FAMILY MEDICINE

## 2023-05-26 PROCEDURE — 87086 URINE CULTURE/COLONY COUNT: CPT

## 2023-05-26 PROCEDURE — 3078F DIAST BP <80 MM HG: CPT | Performed by: FAMILY MEDICINE

## 2023-05-26 PROCEDURE — 81025 URINE PREGNANCY TEST: CPT | Performed by: FAMILY MEDICINE

## 2023-05-26 PROCEDURE — 99214 OFFICE O/P EST MOD 30 MIN: CPT | Mod: 25 | Performed by: FAMILY MEDICINE

## 2023-05-26 PROCEDURE — 3074F SYST BP LT 130 MM HG: CPT | Performed by: FAMILY MEDICINE

## 2023-05-26 RX ORDER — NITROFURANTOIN 25; 75 MG/1; MG/1
100 CAPSULE ORAL 2 TIMES DAILY
Qty: 10 CAPSULE | Refills: 0 | Status: SHIPPED | OUTPATIENT
Start: 2023-05-26 | End: 2023-05-31

## 2023-05-26 ASSESSMENT — FIBROSIS 4 INDEX: FIB4 SCORE: 0.61

## 2023-05-26 NOTE — LETTER
May 26, 2023         Patient: Divine Elise   YOB: 2000   Date of Visit: 5/26/2023           To Whom it May Concern:    Divine Elise was seen in my clinic on 5/26/2023.     Please excuse absence due to illness on 5/26, 5/27.       If you have any questions or concerns, please don't hesitate to call.        Sincerely,           Arnold Mchugh M.D.  Electronically Signed

## 2023-05-27 DIAGNOSIS — N30.00 ACUTE CYSTITIS WITHOUT HEMATURIA: ICD-10-CM

## 2023-05-27 LAB
APPEARANCE UR: CLEAR
BILIRUB UR STRIP-MCNC: NEGATIVE MG/DL
COLOR UR AUTO: YELLOW
GLUCOSE UR STRIP.AUTO-MCNC: NEGATIVE MG/DL
KETONES UR STRIP.AUTO-MCNC: NEGATIVE MG/DL
LEUKOCYTE ESTERASE UR QL STRIP.AUTO: NORMAL
NITRITE UR QL STRIP.AUTO: NEGATIVE
PH UR STRIP.AUTO: 6 [PH] (ref 5–8)
POCT INT CON NEG: NEGATIVE
POCT INT CON POS: POSITIVE
POCT URINE PREGNANCY TEST: NEGATIVE
PROT UR QL STRIP: NEGATIVE MG/DL
RBC UR QL AUTO: NORMAL
SP GR UR STRIP.AUTO: 1.01
UROBILINOGEN UR STRIP-MCNC: NEGATIVE MG/DL

## 2023-05-27 NOTE — PROGRESS NOTES
Subjective:     Chief Complaint   Patient presents with    Vaginal Discharge    Flank Pain                        Abdominal Pain  This is a new problem. The current episode started last night. The onset quality is sudden. The problem occurs constantly. The pain is unchanged. The pain is located in the suprapubic and left pelvis region. The pain is mild. The quality of the pain is described as aching. The pain does not radiate. Associated symptoms includes: mild dysuria. .       Pertinent negatives include no belching, constipation, diarrhea,  , fever, hematochezia, hematuria, nausea or sore throat. Nothing relieves the symptoms. Past treatments include nothing.         LMP- Yesterday        Social History     Tobacco Use    Smoking status: Never     Passive exposure: Never    Smokeless tobacco: Never   Vaping Use    Vaping Use: Never used   Substance Use Topics    Alcohol use: Never    Drug use: Not Currently     Types: Inhaled     Comment: Marijuana daily           Current Outpatient Medications on File Prior to Visit   Medication Sig Dispense Refill    acetaminophen (TYLENOL) 500 MG Tab Take 2 Tablets by mouth every 6 hours. (Patient not taking: Reported on 5/26/2023) 30 Tablet 0    ibuprofen (MOTRIN) 800 MG Tab Take 1 Tablet by mouth every 8 hours. Indications: Joint Damage causing Pain and Loss of Function (Patient not taking: Reported on 5/26/2023) 30 Tablet 0    albuterol 108 (90 Base) MCG/ACT Aero Soln inhalation aerosol Inhale 2 Puffs every 6 hours as needed. (Patient not taking: Reported on 5/26/2023) 8.5 g 1     No current facility-administered medications on file prior to visit.       Family History   Problem Relation Age of Onset    No Known Problems Mother     No Known Problems Father     Autism Brother     Seizures Brother     Cancer Maternal Aunt         Breast    Ovarian Cancer Maternal Aunt     Breast Cancer Maternal Aunt     Diabetes Maternal Grandfather     Hypertension Maternal Grandfather   "        No Known Allergies      Review of Systems   Constitutional: Negative for fever.   HENT: Negative for sore throat.    Gastrointestinal: Positive for abdominal pain. Negative for nausea, diarrhea, constipation and hematochezia.   Genitourinary: Negative for dysuria and hematuria.   Neurological: denies dizziness, confusion, disorientation.   No extremity weakness or numbness  All other systems reviewed and are negative.         Objective:     /76   Pulse 98   Temp 36.6 °C (97.9 °F) (Temporal)   Resp 14   Ht 1.651 m (5' 5\")   Wt 93 kg (205 lb)   SpO2 98%       Physical Exam   Constitutional: pt appears well-developed. No distress.   HENT:   Nose: No nasal discharge.   Mouth/Throat: Mucous membranes are moist. Oropharynx is clear.   Eyes: Conjunctivae and EOM are normal. Pupils are equal, round, and reactive to light. Right eye exhibits no discharge. Left eye exhibits no discharge.   Neck: Neck supple.   Cardiovascular: Normal rate, regular rhythm, S1 normal and S2 normal.    Pulmonary/Chest: Effort normal and breath sounds normal. There is normal air entry. No respiratory distress.   Abdominal: Soft. There is tenderness in the LEFT pelvic and suprapubic  area. bowel sounds are present.   No liver or spleen enlargement .  No rebound and no guarding.   There is no pain over McBurney's point  Lymphadenopathy:     Pt has no  adenopathy.   Neurological: pt is alert and orientated x3 . No cranial nerve deficit.   Skin: Skin is warm and moist. No petechiae and no rash noted.   not diaphoretic. No jaundice.   Nursing note and vitals reviewed.                 Assessment/Plan:           1. Acute cystitis without hematuria  UA findings c/w cystitis   Hcg negative    Urine sent for cx    - nitrofurantoin (MACROBID) 100 MG Cap; Take 1 Capsule by mouth 2 times a day for 5 days.  Dispense: 10 Capsule; Refill: 0    2. Pelvic pain  Likely related to menses vs ovarian cyst    Scheduled for u/s in AM    - US-PELVIC " COMPLETE (TRANSABDOMINAL/TRANSVAGINAL) (COMBO); Future    Differential diagnosis, natural history, supportive care, and indications for immediate follow-up discussed. All questions answered. Patient agrees with the plan of care.     Follow-up as needed if symptoms worsen or fail to improve to PCP, Urgent care or Emergency Room.     I have personally reviewed prior external notes and test results pertinent to today's visit.  I have independently reviewed and interpreted all diagnostics ordered during this urgent care acute visit.

## 2023-05-29 LAB
BACTERIA UR CULT: NORMAL
SIGNIFICANT IND 70042: NORMAL
SITE SITE: NORMAL
SOURCE SOURCE: NORMAL

## 2024-02-15 ENCOUNTER — GYNECOLOGY VISIT (OUTPATIENT)
Dept: OBGYN | Facility: CLINIC | Age: 24
End: 2024-02-15
Payer: MEDICAID

## 2024-02-15 VITALS
BODY MASS INDEX: 29.66 KG/M2 | WEIGHT: 178 LBS | DIASTOLIC BLOOD PRESSURE: 50 MMHG | SYSTOLIC BLOOD PRESSURE: 96 MMHG | HEIGHT: 65 IN

## 2024-02-15 DIAGNOSIS — Z30.09 COUNSELING FOR BIRTH CONTROL, ORAL CONTRACEPTIVES: ICD-10-CM

## 2024-02-15 PROBLEM — N89.8 VAGINAL DISCHARGE: Status: RESOLVED | Noted: 2022-03-15 | Resolved: 2024-02-15

## 2024-02-15 PROBLEM — O09.93 SUPERVISION OF HIGH RISK PREGNANCY IN THIRD TRIMESTER: Status: RESOLVED | Noted: 2022-08-12 | Resolved: 2024-02-15

## 2024-02-15 PROBLEM — Z98.891 HISTORY OF CESAREAN DELIVERY: Status: RESOLVED | Noted: 2022-08-12 | Resolved: 2024-02-15

## 2024-02-15 PROBLEM — O26.899 RH NEGATIVE STATE IN ANTEPARTUM PERIOD: Status: RESOLVED | Noted: 2022-12-08 | Resolved: 2024-02-15

## 2024-02-15 PROBLEM — Z67.91 RH NEGATIVE STATE IN ANTEPARTUM PERIOD: Status: RESOLVED | Noted: 2022-12-08 | Resolved: 2024-02-15

## 2024-02-15 PROCEDURE — 99212 OFFICE O/P EST SF 10 MIN: CPT | Performed by: NURSE PRACTITIONER

## 2024-02-15 PROCEDURE — 3078F DIAST BP <80 MM HG: CPT | Performed by: NURSE PRACTITIONER

## 2024-02-15 PROCEDURE — 3074F SYST BP LT 130 MM HG: CPT | Performed by: NURSE PRACTITIONER

## 2024-02-15 RX ORDER — ACETAMINOPHEN AND CODEINE PHOSPHATE 120; 12 MG/5ML; MG/5ML
1 SOLUTION ORAL DAILY
Qty: 28 TABLET | Refills: 11 | Status: SHIPPED | OUTPATIENT
Start: 2024-02-15

## 2024-02-15 ASSESSMENT — FIBROSIS 4 INDEX: FIB4 SCORE: 0.64

## 2024-02-15 NOTE — PROGRESS NOTES
Here to discuss BC options      History of present illness: 23 y.o.  presents with above chief complaint. Pt is interested in birth control, specifically micro pills.  She is currently using condoms.  Birth control methods used in the past are IUD and pills    LMP: 1/15/2023  Last pap Last pap: negative on 3/15/2022  no hx STDs  Pt is  sexually active with one male partner     Review of systems:  Pertinent positives documented in HPI and all other systems reviewed & are negative    All PMH, PSH, allergies, social history and FH reviewed and updated today:  Past Medical History:   Diagnosis Date    Head ache     Pregnant     Rh negative status during pregnancy 2020       Past Surgical History:   Procedure Laterality Date    REPEAT C SECTION N/A 2023    Procedure: REPEAT  SECTION;  Surgeon: Radha Jaffe M.D.;  Location: [facility];  Service: Obstetrics    [place]  DELIVERY ONLY N/A 2020    Procedure:  SECTION, PRIMARY;  Surgeon: Marisol Cabrera D.O.;  Location: LABOR AND DELIVERY;  Service: Obstetrics    DENTAL EXTRACTION(S)      PRIMARY C SECTION      TONSILLECTOMY         Allergies: No Known Allergies    Social History     Socioeconomic History    Marital status: Single     Spouse name: Not on file    Number of children: Not on file    Years of education: Not on file    Highest education level: Not on file   Occupational History    Not on file   Tobacco Use    Smoking status: Never     Passive exposure: Never    Smokeless tobacco: Never   Vaping Use    Vaping Use: Never used   Substance and Sexual Activity    Alcohol use: Never    Drug use: Not Currently     Types: Inhaled     Comment: Marijuana daily    Sexual activity: Yes     Partners: Male     Birth control/protection: I.U.D.     Comment: IUD on 2020   Other Topics Concern    Not on file   Social History Narrative    Not on file     Social Determinants of Health     Financial Resource Strain: Not on  "file   Food Insecurity: Not on file   Transportation Needs: Not on file   Physical Activity: Not on file   Stress: Not on file   Social Connections: Not on file   Intimate Partner Violence: Not on file   Housing Stability: Not on file       Family History   Problem Relation Age of Onset    No Known Problems Mother     No Known Problems Father     Autism Brother     Seizures Brother     Cancer Maternal Aunt         Breast    Ovarian Cancer Maternal Aunt     Breast Cancer Maternal Aunt     Diabetes Maternal Grandfather     Hypertension Maternal Grandfather        Physical exam:  BP 96/50 (BP Location: Right leg, Patient Position: Sitting, BP Cuff Size: Adult)   Ht 5' 5\"   Wt 178 lb     GENERAL APPEARANCE: healthy, alert, no distress, severe distress, smiling    NEURO Awake, alert and oriented x 3, Normal gait, no sensory deficits  SKIN No rashes, or ulcers or lesions seen  PSYCHIATRIC: Patient shows appropriate affect, is alert and oriented x3, intact judgment and insight.    Assessment/Plan:  1. Counseling for birth control, oral contraceptives            Discussed with patient today were forms of birth control. We reviewed birth control pills and their use, risks benefits and side effects. Pt not interested in discussing IUDs as she has had difficulty with them in the past. We discussed Nexplanon, subdermal implant, risks benefits and side effects.  Also discussed with patient were barrier methods especially condoms for prevention of STDs.     Birth control pamphlet given to patient along with info for bedsider.org.   After discussion pt would like to try micro pills. May consider Nexplanon if pills are not working out.  Rx for Micronor sent  She is to follow up if she desires to switch, or call with other concerns/questions.   "

## 2024-02-15 NOTE — PROGRESS NOTES
Patient here for GYN exam. Discuss BC   LMP= 1/15/23  BCM: No BC  Last pap:3/15/22 WNL  Phone number: [phone] (home)   Pharmacy verified    Last seen: 2/16/23  PT would like mini pills.

## 2024-09-12 ENCOUNTER — APPOINTMENT (OUTPATIENT)
Dept: OBGYN | Facility: CLINIC | Age: 24
End: 2024-09-12
Payer: MEDICAID

## 2024-10-03 ENCOUNTER — APPOINTMENT (OUTPATIENT)
Dept: OBGYN | Facility: CLINIC | Age: 24
End: 2024-10-03
Payer: MEDICAID

## 2025-01-14 ENCOUNTER — OFFICE VISIT (OUTPATIENT)
Dept: URGENT CARE | Facility: PHYSICIAN GROUP | Age: 25
End: 2025-01-14
Payer: MEDICAID

## 2025-01-14 VITALS
RESPIRATION RATE: 18 BRPM | SYSTOLIC BLOOD PRESSURE: 106 MMHG | HEART RATE: 90 BPM | DIASTOLIC BLOOD PRESSURE: 78 MMHG | HEIGHT: 65 IN | WEIGHT: 197.8 LBS | BODY MASS INDEX: 32.96 KG/M2 | OXYGEN SATURATION: 97 % | TEMPERATURE: 98 F

## 2025-01-14 DIAGNOSIS — J10.1 INFLUENZA A H1N1 INFECTION: ICD-10-CM

## 2025-01-14 LAB
FLUAV RNA SPEC QL NAA+PROBE: POSITIVE
FLUBV RNA SPEC QL NAA+PROBE: NEGATIVE
RSV RNA SPEC QL NAA+PROBE: NEGATIVE
SARS-COV-2 RNA RESP QL NAA+PROBE: NEGATIVE

## 2025-01-14 PROCEDURE — 87637 SARSCOV2&INF A&B&RSV AMP PRB: CPT | Mod: QW | Performed by: FAMILY MEDICINE

## 2025-01-14 PROCEDURE — 99213 OFFICE O/P EST LOW 20 MIN: CPT | Performed by: FAMILY MEDICINE

## 2025-01-14 RX ORDER — ONDANSETRON 4 MG/1
4 TABLET, ORALLY DISINTEGRATING ORAL ONCE
Status: COMPLETED | OUTPATIENT
Start: 2025-01-14 | End: 2025-01-14

## 2025-01-14 RX ORDER — OSELTAMIVIR PHOSPHATE 75 MG/1
75 CAPSULE ORAL 2 TIMES DAILY
Qty: 10 CAPSULE | Refills: 0 | Status: SHIPPED | OUTPATIENT
Start: 2025-01-14 | End: 2025-01-19

## 2025-01-14 RX ORDER — ONDANSETRON 4 MG/1
4 TABLET, ORALLY DISINTEGRATING ORAL EVERY 8 HOURS PRN
Qty: 10 TABLET | Refills: 0 | Status: SHIPPED | OUTPATIENT
Start: 2025-01-14

## 2025-01-14 RX ORDER — IBUPROFEN 800 MG/1
800 TABLET, FILM COATED ORAL EVERY 8 HOURS PRN
Qty: 60 TABLET | Refills: 0 | Status: SHIPPED | OUTPATIENT
Start: 2025-01-14

## 2025-01-14 RX ADMIN — ONDANSETRON 4 MG: 4 TABLET, ORALLY DISINTEGRATING ORAL at 12:08

## 2025-01-14 NOTE — PROGRESS NOTES
Chief Complaint   Patient presents with    Cold Symptoms     Fatigue, body pain, cough, chills - FLU A exposure          Cough  This is a new problem. The current episode started yesterday. The problem has been unchanged. The problem occurs constantly. The cough is dry. Associated symptoms include : fatigue, headaches, chills, muscle aches, fever. Pertinent negatives include no   nausea, vomiting, diarrhea, sweats, weight loss or wheezing. Nothing aggravates the symptoms.  Patient has tried nothing for the symptoms. There is no history of asthma.        Past Medical History:   Diagnosis Date    Head ache     Pregnant     Rh negative status during pregnancy 04/29/2020         Social History     Tobacco Use    Smoking status: Never     Passive exposure: Never    Smokeless tobacco: Never   Vaping Use    Vaping status: Never Used   Substance Use Topics    Alcohol use: Never    Drug use: Not Currently     Types: Inhaled     Comment: Marijuana daily           Family History   Problem Relation Age of Onset    No Known Problems Mother     No Known Problems Father     Autism Brother     Seizures Brother     Cancer Maternal Aunt         Breast    Ovarian Cancer Maternal Aunt     Breast Cancer Maternal Aunt     Diabetes Maternal Grandfather     Hypertension Maternal Grandfather                     Review of Systems   Constitutional: positive for fever and chills  HENT: negative for otalgia, sore throat  Cardiovascular - denies chest pain or dyspnea  Respiratory: Positive for cough.  .  Negative for wheezing.    Neurological: Negative for headaches, dizziness   GI - denies nausea, vomiting or diarrhea   - denies dysuria, discharge  Psych - denies depression, anxiety  Neuro - denies numbness or tingling.   10 point ROS otherwise negative, except per HPI             Objective:     /78 (BP Location: Right arm, Patient Position: Sitting, BP Cuff Size: Large adult)   Pulse 90   Temp 36.7 °C (98 °F) (Temporal)   Resp 18    "Ht 1.651 m (5' 5\")   Wt 89.7 kg (197 lb 12.8 oz)   SpO2 97%       Physical Exam   Constitutional: patient is oriented to person, place, and time. Patient appears well-developed and well-nourished. No distress.   HENT:   Head: Normocephalic and atraumatic.   Right Ear: External ear normal.   Left Ear: External ear normal.   TMs normal  Nose: Mucosal edema  present. Right sinus exhibits no maxillary sinus tenderness. Left sinus exhibits no maxillary sinus tenderness.   Mouth/Throat: Mucous membranes are normal. No oral lesions.  No posterior pharyngeal erythema.  No oropharyngeal exudate or posterior oropharyngeal edema.   Eyes: Conjunctivae and EOM are normal. Pupils are equal, round, and reactive to light. Right eye exhibits no discharge. Left eye exhibits no discharge. No scleral icterus.   Neck: Normal range of motion. Neck supple. No tracheal deviation present.   Cardiovascular: Normal rate, regular rhythm and normal heart sounds.  Exam reveals no friction rub.    Pulmonary/Chest: Effort normal. No respiratory distress. Patient has no wheezes or rhonchi. Patient has no rales.    Musculoskeletal:  exhibits no edema.   Lymphadenopathy:     Patient has no cervical adenopathy.      Neurological: patient is alert and oriented to person, place, and time.   Skin: Skin is warm and dry. No rash noted. No erythema.   Psychiatric: patient  has a normal mood and affect.  behavior is normal.   Nursing note and vitals reviewed.          Assesment/Plan:    Positive for Influenza A     PCR-confirmed       - ondansetron (Zofran ODT) dispertab 4 mg  - ondansetron (ZOFRAN ODT) 4 MG TABLET DISPERSIBLE; Take 1 Tablet by mouth every 8 hours as needed for Nausea/Vomiting.  Dispense: 10 Tablet; Refill: 0  - ibuprofen (MOTRIN) 800 MG Tab; Take 1 Tablet by mouth every 8 hours as needed for Moderate Pain.  Dispense: 60 Tablet; Refill: 0     - oseltamivir (TAMIFLU) 75 MG Cap; Take 1 Capsule by mouth 2 times a day for 5 days.  Dispense: 10 " Capsule; Refill: 0    Differential diagnosis, natural history, supportive care, and indications for immediate follow-up discussed. All questions answered. Patient agrees with the plan of care.     Follow-up as needed if symptoms worsen or fail to improve to PCP, Urgent care or Emergency Room.     I have personally reviewed prior external notes and test results pertinent to today's visit.  I have independently reviewed and interpreted all diagnostics ordered during this urgent care acute visit.

## 2025-01-20 DIAGNOSIS — J10.1 INFLUENZA A H1N1 INFECTION: ICD-10-CM

## 2025-01-20 RX ORDER — ONDANSETRON 4 MG/1
TABLET, ORALLY DISINTEGRATING ORAL
Qty: 10 TABLET | Refills: 0 | OUTPATIENT
Start: 2025-01-20

## 2025-07-02 ENCOUNTER — HOSPITAL ENCOUNTER (OUTPATIENT)
Facility: MEDICAL CENTER | Age: 25
End: 2025-07-02
Payer: MEDICAID

## 2025-07-02 ENCOUNTER — OFFICE VISIT (OUTPATIENT)
Dept: URGENT CARE | Facility: PHYSICIAN GROUP | Age: 25
End: 2025-07-02
Payer: MEDICAID

## 2025-07-02 VITALS
OXYGEN SATURATION: 98 % | DIASTOLIC BLOOD PRESSURE: 64 MMHG | SYSTOLIC BLOOD PRESSURE: 110 MMHG | BODY MASS INDEX: 34.99 KG/M2 | RESPIRATION RATE: 16 BRPM | TEMPERATURE: 97.9 F | HEIGHT: 65 IN | HEART RATE: 76 BPM | WEIGHT: 210 LBS

## 2025-07-02 DIAGNOSIS — R30.0 DYSURIA: ICD-10-CM

## 2025-07-02 DIAGNOSIS — R30.0 DYSURIA: Primary | ICD-10-CM

## 2025-07-02 DIAGNOSIS — N30.00 ACUTE CYSTITIS WITHOUT HEMATURIA: ICD-10-CM

## 2025-07-02 PROCEDURE — 81025 URINE PREGNANCY TEST: CPT

## 2025-07-02 PROCEDURE — 99214 OFFICE O/P EST MOD 30 MIN: CPT | Mod: 25

## 2025-07-02 PROCEDURE — 81002 URINALYSIS NONAUTO W/O SCOPE: CPT

## 2025-07-02 PROCEDURE — 87086 URINE CULTURE/COLONY COUNT: CPT

## 2025-07-02 RX ORDER — ETONOGESTREL 68 MG/1
1 IMPLANT SUBCUTANEOUS ONCE
COMMUNITY

## 2025-07-02 RX ORDER — NITROFURANTOIN 25; 75 MG/1; MG/1
100 CAPSULE ORAL 2 TIMES DAILY
Qty: 10 CAPSULE | Refills: 0 | Status: SHIPPED | OUTPATIENT
Start: 2025-07-02 | End: 2025-07-07

## 2025-07-02 RX ORDER — IBUPROFEN 800 MG/1
800 TABLET, FILM COATED ORAL EVERY 8 HOURS PRN
Qty: 30 TABLET | Refills: 0 | Status: SHIPPED | OUTPATIENT
Start: 2025-07-02

## 2025-07-02 NOTE — PROGRESS NOTES
Subjective     Divine Elise is a 24 y.o. female who presents with Dysuria (Poss UTI sx 3 wks. Has been taking AZO, period started now feels like razor blades. )    HPI:    Divine is a 25yo female presenting for dysuria and urinary frequency x3 weeks. Reports associated urinary urgency. Reports history of UTI with symptoms similar in nature. Denies abdominal pain. No abnormal vaginal discharge or itching. Denies flank pain, no fevers/chills. BMs regular. She is currently on her menstrual period. Denies hematuria. Denies shortness of breath or vomiting. She has attempted Azo for relief. Denies concern for STI.          Review of Systems   Constitutional:  Negative for chills, fever and malaise/fatigue.   Respiratory:  Negative for cough, shortness of breath and stridor.    Gastrointestinal:  Negative for abdominal pain, diarrhea, nausea and vomiting.   Genitourinary:  Positive for dysuria, frequency and urgency. Negative for flank pain and hematuria.   Musculoskeletal:  Negative for back pain, myalgias and neck pain.   Skin:  Negative for itching and rash.   Neurological:  Negative for dizziness, focal weakness and weakness.     Past Medical History:  Diagnosis Date    Head ache     Pregnant     Rh negative status during pregnancy 2020     Past Surgical History:  Procedure Laterality Date    REPEAT C SECTION N/A 2023    Procedure: REPEAT  SECTION;  Surgeon: Radha Jaffe M.D.;  Location: [facility];  Service: Obstetrics    SC  DELIVERY ONLY N/A 2020    Procedure:  SECTION, PRIMARY;  Surgeon: Marisol Cabrera D.O.;  Location: LABOR AND DELIVERY;  Service: Obstetrics    DENTAL EXTRACTION(S)      PRIMARY C SECTION      TONSILLECTOMY       Patient has no known allergies.     Current Outpatient Medications:     etonogestrel (NEXPLANON) 68 MG Implant implant, 1 Each by Subdermal route one time., Disp: , Rfl:     nitrofurantoin (MACROBID) 100 MG Cap, Take 1  "Capsule by mouth 2 times a day for 5 days., Disp: 10 Capsule, Rfl: 0    ibuprofen (MOTRIN) 800 MG Tab, Take 1 Tablet by mouth every 8 hours as needed for Mild Pain or Moderate Pain., Disp: 30 Tablet, Rfl: 0    Social History  Tobacco Use    Smoking status: Never     Passive exposure: Never    Smokeless tobacco: Never   Vaping Use    Vaping status: Never Used   Substance Use Topics    Alcohol use: Never    Drug use: Not Currently     Types: Inhaled     Comment: Marijuana daily     Family History   Problem Relation Age of Onset    No Known Problems Mother     No Known Problems Father     Autism Brother     Seizures Brother     Cancer Maternal Aunt         Breast    Ovarian Cancer Maternal Aunt     Breast Cancer Maternal Aunt     Diabetes Maternal Grandfather     Hypertension Maternal Grandfather      Medications, Allergies, and current problem list reviewed today in Epic.      Objective     /64   Pulse 76   Temp 36.6 °C (97.9 °F) (Temporal)   Resp 16   Ht 1.651 m (5' 5\")   Wt 95.3 kg (210 lb)   SpO2 98%   BMI 34.95 kg/m²      Physical Exam  Vitals reviewed. Chaperone present: Exam deferred by patient.   Constitutional:       General: She is not in acute distress.  HENT:      Nose: Nose normal.   Eyes:      General: Vision grossly intact. Gaze aligned appropriately. No visual field deficit.     Extraocular Movements: Extraocular movements intact.      Conjunctiva/sclera: Conjunctivae normal.      Pupils: Pupils are equal, round, and reactive to light.   Cardiovascular:      Rate and Rhythm: Normal rate and regular rhythm.      Pulses: Normal pulses.      Heart sounds: Normal heart sounds.   Pulmonary:      Effort: Pulmonary effort is normal. No tachypnea, accessory muscle usage, prolonged expiration, respiratory distress or retractions.      Breath sounds: Normal breath sounds. No stridor, decreased air movement or transmitted upper airway sounds. No wheezing, rhonchi or rales.   Abdominal:      General: " Abdomen is flat.      Palpations: Abdomen is soft.      Tenderness: There is no abdominal tenderness. There is no right CVA tenderness or left CVA tenderness.   Musculoskeletal:         General: Normal range of motion.      Cervical back: Full passive range of motion without pain, normal range of motion and neck supple. No rigidity. Normal range of motion.   Skin:     General: Skin is warm and dry.      Findings: No rash.   Neurological:      Mental Status: She is alert. Mental status is at baseline.      Sensory: Sensation is intact.      Motor: Motor function is intact.      Coordination: Coordination is intact.      Gait: Gait is intact.   Psychiatric:         Mood and Affect: Mood normal.         Behavior: Behavior normal.         Thought Content: Thought content normal.       Results for orders placed or performed in visit on 07/02/25   POCT Urinalysis    Collection Time: 07/02/25  2:50 PM   Result Value Ref Range    POC Color yellow Negative    POC Appearance clear Negative    POC Glucose neg Negative mg/dL    POC Bilirubin neg Negative mg/dL    POC Ketones neg Negative mg/dL    POC Specific Gravity 1.015 <1.005 - >1.030    POC Blood moderate Negative    POC Urine PH 7.0 5.0 - 8.0    POC Protein neg Negative mg/dL    POC Urobiligen 0.2 Negative (0.2) mg/dL    POC Nitrites neg Negative    POC Leukocyte Esterase trace Negative   POCT PREGNANCY    Collection Time: 07/02/25  2:50 PM   Result Value Ref Range    POC Urine Pregnancy Test Negative     Internal Control Positive Positive     Internal Control Negative Negative      Assessment & Plan    1. Dysuria (Primary)   - POCT Urinalysis  - POCT PREGNANCY  - URINE CULTURE(NEW); Future    2. Acute cystitis without hematuria   - nitrofurantoin (MACROBID) 100 MG Cap; Take 1 Capsule by mouth 2 times a day for 5 days.  Dispense: 10 Capsule; Refill: 0  - ibuprofen (MOTRIN) 800 MG Tab; Take 1 Tablet by mouth every 8 hours as needed for Mild Pain or Moderate Pain.   Dispense: 30 Tablet; Refill: 0       MDM/Comments:   Urinalysis without clear evidence of infection. Symptom presentation consistent with diagnosis of acute cystitis. Shared decision making with patient, will empirically treat with Nitrofurantoin until result of urine culture is back. Cockcroft-Gault method utilized to calculate CrCl, no renal dosing required.     Illness progression and alarm symptoms discussed with patient, emphasizing low threshold for returning to clinic/emergency department for worsening symptoms. Patient is agreeable to the plan and verbalizes understanding, and will follow up if warranted. Will return if body aches, chills, fever, back/flank pain occur. Discussed signs of kidney infection.       Differential diagnosis, natural history, supportive care, and indications for immediate follow-up discussed.      Follow-up as needed if symptoms worsen or fail to improve to PCP, Urgent care or Emergency Room.          Discussed red flags and reasons to return to UC or ED.           I personally reviewed prior external notes and test results pertinent to today's visit.  I have independently reviewed and interpreted all diagnostics ordered during this urgent care visit.                                     Electronically signed by ADITHYA Camejo

## 2025-07-03 ASSESSMENT — ENCOUNTER SYMPTOMS
SHORTNESS OF BREATH: 0
FEVER: 0
BACK PAIN: 0
VOMITING: 0
MYALGIAS: 0
STRIDOR: 0
NAUSEA: 0
FLANK PAIN: 0
ABDOMINAL PAIN: 0
DIZZINESS: 0
COUGH: 0
NECK PAIN: 0
FOCAL WEAKNESS: 0
DIARRHEA: 0
WEAKNESS: 0
CHILLS: 0

## 2025-07-03 ASSESSMENT — VISUAL ACUITY: OU: 1

## 2025-07-04 LAB
BACTERIA UR CULT: NORMAL
SIGNIFICANT IND 70042: NORMAL
SITE SITE: NORMAL
SOURCE SOURCE: NORMAL

## 2025-07-09 SDOH — ECONOMIC STABILITY: FOOD INSECURITY: WITHIN THE PAST 12 MONTHS, YOU WORRIED THAT YOUR FOOD WOULD RUN OUT BEFORE YOU GOT MONEY TO BUY MORE.: NEVER TRUE

## 2025-07-09 SDOH — ECONOMIC STABILITY: TRANSPORTATION INSECURITY
IN THE PAST 12 MONTHS, HAS LACK OF RELIABLE TRANSPORTATION KEPT YOU FROM MEDICAL APPOINTMENTS, MEETINGS, WORK OR FROM GETTING THINGS NEEDED FOR DAILY LIVING?: NO

## 2025-07-09 SDOH — ECONOMIC STABILITY: TRANSPORTATION INSECURITY
IN THE PAST 12 MONTHS, HAS THE LACK OF TRANSPORTATION KEPT YOU FROM MEDICAL APPOINTMENTS OR FROM GETTING MEDICATIONS?: NO

## 2025-07-09 SDOH — HEALTH STABILITY: PHYSICAL HEALTH: ON AVERAGE, HOW MANY DAYS PER WEEK DO YOU ENGAGE IN MODERATE TO STRENUOUS EXERCISE (LIKE A BRISK WALK)?: 3 DAYS

## 2025-07-09 SDOH — HEALTH STABILITY: PHYSICAL HEALTH: ON AVERAGE, HOW MANY MINUTES DO YOU ENGAGE IN EXERCISE AT THIS LEVEL?: 60 MIN

## 2025-07-09 SDOH — ECONOMIC STABILITY: INCOME INSECURITY: HOW HARD IS IT FOR YOU TO PAY FOR THE VERY BASICS LIKE FOOD, HOUSING, MEDICAL CARE, AND HEATING?: NOT VERY HARD

## 2025-07-09 SDOH — ECONOMIC STABILITY: HOUSING INSECURITY
IN THE LAST 12 MONTHS, WAS THERE A TIME WHEN YOU DID NOT HAVE A STEADY PLACE TO SLEEP OR SLEPT IN A SHELTER (INCLUDING NOW)?: NO

## 2025-07-09 SDOH — ECONOMIC STABILITY: INCOME INSECURITY: IN THE LAST 12 MONTHS, WAS THERE A TIME WHEN YOU WERE NOT ABLE TO PAY THE MORTGAGE OR RENT ON TIME?: NO

## 2025-07-09 SDOH — ECONOMIC STABILITY: FOOD INSECURITY: WITHIN THE PAST 12 MONTHS, THE FOOD YOU BOUGHT JUST DIDN'T LAST AND YOU DIDN'T HAVE MONEY TO GET MORE.: NEVER TRUE

## 2025-07-09 SDOH — ECONOMIC STABILITY: TRANSPORTATION INSECURITY
IN THE PAST 12 MONTHS, HAS LACK OF TRANSPORTATION KEPT YOU FROM MEETINGS, WORK, OR FROM GETTING THINGS NEEDED FOR DAILY LIVING?: NO

## 2025-07-09 SDOH — HEALTH STABILITY: MENTAL HEALTH
STRESS IS WHEN SOMEONE FEELS TENSE, NERVOUS, ANXIOUS, OR CAN'T SLEEP AT NIGHT BECAUSE THEIR MIND IS TROUBLED. HOW STRESSED ARE YOU?: ONLY A LITTLE

## 2025-07-09 ASSESSMENT — SOCIAL DETERMINANTS OF HEALTH (SDOH)
IN THE PAST 12 MONTHS, HAS THE ELECTRIC, GAS, OIL, OR WATER COMPANY THREATENED TO SHUT OFF SERVICE IN YOUR HOME?: NO
HOW OFTEN DO YOU ATTEND CHURCH OR RELIGIOUS SERVICES?: NEVER
WITHIN THE PAST 12 MONTHS, YOU WORRIED THAT YOUR FOOD WOULD RUN OUT BEFORE YOU GOT THE MONEY TO BUY MORE: NEVER TRUE
HOW OFTEN DO YOU HAVE A DRINK CONTAINING ALCOHOL: NEVER
DO YOU BELONG TO ANY CLUBS OR ORGANIZATIONS SUCH AS CHURCH GROUPS UNIONS, FRATERNAL OR ATHLETIC GROUPS, OR SCHOOL GROUPS?: NO
HOW OFTEN DO YOU ATTENT MEETINGS OF THE CLUB OR ORGANIZATION YOU BELONG TO?: NEVER
HOW OFTEN DO YOU GET TOGETHER WITH FRIENDS OR RELATIVES?: NEVER
HOW OFTEN DO YOU GET TOGETHER WITH FRIENDS OR RELATIVES?: NEVER
IN A TYPICAL WEEK, HOW MANY TIMES DO YOU TALK ON THE PHONE WITH FAMILY, FRIENDS, OR NEIGHBORS?: NEVER
HOW OFTEN DO YOU HAVE SIX OR MORE DRINKS ON ONE OCCASION: NEVER
ARE YOU MARRIED, WIDOWED, DIVORCED, SEPARATED, NEVER MARRIED, OR LIVING WITH A PARTNER?: LIVING WITH PARTNER
HOW OFTEN DO YOU ATTENT MEETINGS OF THE CLUB OR ORGANIZATION YOU BELONG TO?: NEVER
IN A TYPICAL WEEK, HOW MANY TIMES DO YOU TALK ON THE PHONE WITH FAMILY, FRIENDS, OR NEIGHBORS?: NEVER
HOW HARD IS IT FOR YOU TO PAY FOR THE VERY BASICS LIKE FOOD, HOUSING, MEDICAL CARE, AND HEATING?: NOT VERY HARD
HOW MANY DRINKS CONTAINING ALCOHOL DO YOU HAVE ON A TYPICAL DAY WHEN YOU ARE DRINKING: PATIENT DOES NOT DRINK
ARE YOU MARRIED, WIDOWED, DIVORCED, SEPARATED, NEVER MARRIED, OR LIVING WITH A PARTNER?: LIVING WITH PARTNER
DO YOU BELONG TO ANY CLUBS OR ORGANIZATIONS SUCH AS CHURCH GROUPS UNIONS, FRATERNAL OR ATHLETIC GROUPS, OR SCHOOL GROUPS?: NO
HOW OFTEN DO YOU ATTEND CHURCH OR RELIGIOUS SERVICES?: NEVER

## 2025-07-09 ASSESSMENT — LIFESTYLE VARIABLES
HOW OFTEN DO YOU HAVE A DRINK CONTAINING ALCOHOL: NEVER
AUDIT-C TOTAL SCORE: 0
SKIP TO QUESTIONS 9-10: 1
HOW MANY STANDARD DRINKS CONTAINING ALCOHOL DO YOU HAVE ON A TYPICAL DAY: PATIENT DOES NOT DRINK
HOW OFTEN DO YOU HAVE SIX OR MORE DRINKS ON ONE OCCASION: NEVER

## 2025-07-10 ENCOUNTER — HOSPITAL ENCOUNTER (OUTPATIENT)
Facility: MEDICAL CENTER | Age: 25
End: 2025-07-10
Attending: PHYSICIAN ASSISTANT
Payer: MEDICAID

## 2025-07-10 ENCOUNTER — OFFICE VISIT (OUTPATIENT)
Dept: MEDICAL GROUP | Facility: CLINIC | Age: 25
End: 2025-07-10
Payer: MEDICAID

## 2025-07-10 VITALS
HEIGHT: 65 IN | OXYGEN SATURATION: 98 % | SYSTOLIC BLOOD PRESSURE: 120 MMHG | WEIGHT: 210.54 LBS | HEART RATE: 65 BPM | RESPIRATION RATE: 18 BRPM | TEMPERATURE: 97.8 F | DIASTOLIC BLOOD PRESSURE: 68 MMHG | BODY MASS INDEX: 35.08 KG/M2

## 2025-07-10 DIAGNOSIS — R39.89 PAINFUL BLADDER SPASM: ICD-10-CM

## 2025-07-10 DIAGNOSIS — R10.2 PELVIC PAIN: ICD-10-CM

## 2025-07-10 DIAGNOSIS — R35.0 FREQUENT URINATION: Primary | ICD-10-CM

## 2025-07-10 DIAGNOSIS — F32.9 MAJOR DEPRESSION, CHRONIC: ICD-10-CM

## 2025-07-10 DIAGNOSIS — R32 URINARY INCONTINENCE, UNSPECIFIED TYPE: ICD-10-CM

## 2025-07-10 DIAGNOSIS — N63.10 MASS OF RIGHT BREAST, UNSPECIFIED QUADRANT: ICD-10-CM

## 2025-07-10 DIAGNOSIS — Z30.9 ENCOUNTER FOR CONTRACEPTIVE MANAGEMENT, UNSPECIFIED TYPE: ICD-10-CM

## 2025-07-10 DIAGNOSIS — R53.83 OTHER FATIGUE: ICD-10-CM

## 2025-07-10 DIAGNOSIS — R30.0 DYSURIA: ICD-10-CM

## 2025-07-10 DIAGNOSIS — T14.8XXA BRUISING: ICD-10-CM

## 2025-07-10 DIAGNOSIS — N32.89 PAINFUL BLADDER SPASM: ICD-10-CM

## 2025-07-10 DIAGNOSIS — E16.2 HYPOGLYCEMIA: ICD-10-CM

## 2025-07-10 LAB — AMBIGUOUS DTTM AMBI4: NORMAL

## 2025-07-10 PROCEDURE — 99214 OFFICE O/P EST MOD 30 MIN: CPT | Performed by: PHYSICIAN ASSISTANT

## 2025-07-10 PROCEDURE — 3074F SYST BP LT 130 MM HG: CPT | Performed by: PHYSICIAN ASSISTANT

## 2025-07-10 PROCEDURE — 87086 URINE CULTURE/COLONY COUNT: CPT

## 2025-07-10 PROCEDURE — 3078F DIAST BP <80 MM HG: CPT | Performed by: PHYSICIAN ASSISTANT

## 2025-07-10 RX ORDER — NITROFURANTOIN 25; 75 MG/1; MG/1
100 CAPSULE ORAL 2 TIMES DAILY
Qty: 14 CAPSULE | Refills: 0 | Status: SHIPPED | OUTPATIENT
Start: 2025-07-10 | End: 2025-07-22

## 2025-07-10 ASSESSMENT — PATIENT HEALTH QUESTIONNAIRE - PHQ9
CLINICAL INTERPRETATION OF PHQ2 SCORE: 2
5. POOR APPETITE OR OVEREATING: 3 - NEARLY EVERY DAY
SUM OF ALL RESPONSES TO PHQ QUESTIONS 1-9: 15

## 2025-07-10 NOTE — PROGRESS NOTES
"cc:  URINE    Subjective:     Divine Elise is a 24 y.o. female presenting for URINE        History of Present Illness  The patient is a 24-year-old female who presents to the office today as a new patient, complaining of urinary symptoms, depression, fatigue, pelvic pain, and a right breast mass.    She reports experiencing urinary incontinence when coughing or smoking. She has been using nitrofurantoin for her UTI, which she reports has been effective. She mentions that her menstrual cycle is irregular and she recently had an internal bladder UTI that went unnoticed for over a month. She was on her period 1 to 2 days ago. She took UTI medicine for 2 days, which changed her urine color. She did not take the medication for more than 2 days as advised. During her period, she experienced severe pain while urinating, describing it as feeling like razor blades. She did not initially think she had a UTI because she did not feel any symptoms, but her mother suggested it could be a bladder problem.    She has a positive depression screen with a risk of self-harm. She reports no risk of self-harm but admits to having thoughts of being better off dead or hurting herself sometimes. She does not have a plan to act on these thoughts. She experiences moments of sadness but then reality comes back and she finds it very painful to be where she is. She has 2 little boys and her mother helps her 24/7. She is open to counseling and medication but does not want to feel like a \"zombie.\" She believes her Nexplanon is causing her emotional issues and wants to have it removed. She does not have a gynecologist and is considering getting her tubes tied. She has tried birth control pills and IUDs in the past.    She experiences fatigue and relies on caffeine to get through the day. She takes a 200 mg pill or drinks an energy drink in the morning and again around lunchtime. She feels her sugar levels drop sometimes and needs to eat " "something sweet.    She has been experiencing left-sided pelvic pain since the birth of her second child. The pain is located on her left side and feels like it is in her ovary. It is not too low and she feels it after sex and randomly at other times. No one has checked it yet. She has been feeling this pain for years, ever since she had both kids.    She has noticed a lump in her right breast when lying down.    GYNECOLOGICAL HISTORY:  - Last Menstrual Period: 2025  - Menstrual Pain: Yes    PAST SURGICAL HISTORY:  -  x2  - [place] teeth extraction    She bruises easily.    SOCIAL HISTORY  - Does not smoke  - Does not use alcohol  - Current marijuana use    FAMILY HISTORY  - Brother has a history of autism and seizures  - Maternal aunt has a history of breast cancer, ovarian cancer, fibromyalgia, depression, and anxiety  - Maternal grandfather had diabetes and high blood pressure       Review of systems:  See above.   Denies any symptoms unless previously indicated.      Current Medications[1]    Allergies, past medical history, past surgical history, family history, social history reviewed and updated    Objective:     Vitals: /68   Pulse 65   Temp 36.6 °C (97.8 °F)   Resp 18   Ht 1.651 m (5' 5\")   Wt 95.5 kg (210 lb 8.6 oz)   SpO2 98%   BMI 35.04 kg/m²   General: Alert, pleasant, NAD  EYES:   PERRL, EOMI, no icterus or pallor.  Conjunctivae and lids normal.   HENT:  Normocephalic.  External ears normal.      Respiratory: Normal respiratory effort.    Abdomen: obese.  Skin: Warm, dry, no rashes.  Musculoskeletal: Gait is normal.  Moves all extremities well.    Neurological: No tremors, sensation grossly intact, CN2-12 intact.  Psych:  Affect/mood is normal, judgement is good, memory is intact, grooming is appropriate.    Physical Exam  Genitourinary: Trace leukocyte esterase and trace blood in urine.  Breast: Possible right breast mass noted by patient.  Pelvic: Left-sided pelvic pain " noted by patient.       Results  Labs   - Urinalysis: Small amount of leukocyte esterase and trace amount of blood       Assessment/Plan:     Divine was seen today for bladder problem, back pain and other.    Diagnoses and all orders for this visit:    Frequent urination  -     POCT Urinalysis    Painful bladder spasm  -     POCT Urinalysis    Dysuria  -     URINE CULTURE(NEW)  -     nitrofurantoin (MACROBID) 100 MG Cap; Take 1 Capsule by mouth 2 times a day.    Urinary incontinence, unspecified type    Hypoglycemia    Other fatigue  -     Comp Metabolic Panel; Future    Bruising  -     VITAMIN D,25 HYDROXY (DEFICIENCY); Future  -     TSH WITH REFLEX TO FT4; Future  -     HEMOGLOBIN A1C; Future  -     CBC WITH DIFFERENTIAL; Future  -     IRON/TOTAL IRON BIND; Future  -     VITAMIN B12; Future  -     FOLATE; Future  -     FERRITIN; Future  -     VITAMIN C SERUM; Future  -     LUPUS ANTICOAGULANT REFLEXIVE PANEL; Future    Major depression, chronic  -     Referral to Behavioral Health    Encounter for contraceptive management, unspecified type  -     Referral to Gynecology    Pelvic pain  -     US-PELVIC COMPLETE (TRANSABDOMINAL/TRANSVAGINAL) (COMBO); Future    Mass of right breast, unspecified quadrant  -     US-BREAST BILAT-COMPLETE; Future        Assessment & Plan  1. Urinary Tract Infection.  - Reports frequent urination, painful bladder spasms, dysuria, and urinary incontinence.  - Urine in the office shows trace blood as well as trace leukocytes.  - Urinalysis collected will be sent to the lab for analysis.  - Nitrofurantoin 100 mg twice a day for 1 week will be started. If the culture is negative, cystitis may need to be considered. Further discussion will be held at follow-up.    2. Hypoglycemia/Fatigue/Bruising.  - Reports fatigue and occasional symptoms of low blood sugar.  - Routine lab work ordered to evaluate further, including metabolic panel, liver function, kidney function, sugars, vitamin D, thyroid,  average sugar, iron levels, vitamin C, and lupus anticoagulation factor.    3. Chronic Major Depression.  - Denies plan of self-harm and does not want to initiate medications at this time.  - Believes this may be related to her Nexplanon, which she would like to have removed.  - Referral for counseling will be made.    4. Encounter for Contraceptive Management.  - Would like to have Nexplanon removed and is requesting a possible tubal ligation.  - Referral to gynecology will be made.    5. Pelvic Pain.  - Reports left-sided pelvic pain, possibly related to her ovary.  - An ultrasound will be ordered.    6. Right Breast Mass.  - Reports a possible lump in her right breast.  - A breast ultrasound will be ordered to evaluate further.    Follow-up  The patient will follow up in 4 to 6 weeks with test results including ultrasound if we have results.    Return in about 4 weeks (around 8/7/2025) for 4-6 week follow up testing. .    Please note that this dictation was created using voice recognition software. I have made every reasonable attempt to correct obvious errors, but expect that there are errors of grammar and possible content that I did not discover before finalizing note.               [1]   Current Outpatient Medications:     nitrofurantoin (MACROBID) 100 MG Cap, Take 1 Capsule by mouth 2 times a day., Disp: 14 Capsule, Rfl: 0    etonogestrel (NEXPLANON) 68 MG Implant implant, 1 Each by Subdermal route one time., Disp: , Rfl:     ibuprofen (MOTRIN) 800 MG Tab, Take 1 Tablet by mouth every 8 hours as needed for Mild Pain or Moderate Pain., Disp: 30 Tablet, Rfl: 0    ondansetron (ZOFRAN ODT) 4 MG TABLET DISPERSIBLE, Take 1 Tablet by mouth every 8 hours as needed for Nausea/Vomiting. (Patient not taking: Reported on 7/10/2025), Disp: 10 Tablet, Rfl: 0    acetaminophen (TYLENOL) 500 MG Tab, Take 2 Tablets by mouth every 6 hours. (Patient not taking: Reported on 7/10/2025), Disp: 30 Tablet, Rfl: 0

## 2025-07-11 ENCOUNTER — HOSPITAL ENCOUNTER (OUTPATIENT)
Dept: LAB | Facility: MEDICAL CENTER | Age: 25
End: 2025-07-11
Attending: PHYSICIAN ASSISTANT
Payer: MEDICAID

## 2025-07-11 DIAGNOSIS — R53.83 OTHER FATIGUE: ICD-10-CM

## 2025-07-11 DIAGNOSIS — T14.8XXA BRUISING: ICD-10-CM

## 2025-07-11 LAB
25(OH)D3 SERPL-MCNC: 26 NG/ML (ref 30–100)
ALBUMIN SERPL BCP-MCNC: 4.5 G/DL (ref 3.2–4.9)
ALBUMIN/GLOB SERPL: 1.6 G/DL
ALP SERPL-CCNC: 60 U/L (ref 30–99)
ALT SERPL-CCNC: 15 U/L (ref 2–50)
ANION GAP SERPL CALC-SCNC: 11 MMOL/L (ref 7–16)
AST SERPL-CCNC: 19 U/L (ref 12–45)
BASOPHILS # BLD AUTO: 0.7 % (ref 0–1.8)
BASOPHILS # BLD: 0.05 K/UL (ref 0–0.12)
BILIRUB SERPL-MCNC: 0.3 MG/DL (ref 0.1–1.5)
BUN SERPL-MCNC: 11 MG/DL (ref 8–22)
CALCIUM ALBUM COR SERPL-MCNC: 8.7 MG/DL (ref 8.5–10.5)
CALCIUM SERPL-MCNC: 9.1 MG/DL (ref 8.5–10.5)
CHLORIDE SERPL-SCNC: 105 MMOL/L (ref 96–112)
CO2 SERPL-SCNC: 20 MMOL/L (ref 20–33)
CREAT SERPL-MCNC: 0.82 MG/DL (ref 0.5–1.4)
EOSINOPHIL # BLD AUTO: 0.12 K/UL (ref 0–0.51)
EOSINOPHIL NFR BLD: 1.7 % (ref 0–6.9)
ERYTHROCYTE [DISTWIDTH] IN BLOOD BY AUTOMATED COUNT: 42.5 FL (ref 35.9–50)
EST. AVERAGE GLUCOSE BLD GHB EST-MCNC: 111 MG/DL
FASTING STATUS PATIENT QL REPORTED: NORMAL
FERRITIN SERPL-MCNC: 61.3 NG/ML (ref 10–291)
FOLATE SERPL-MCNC: 21 NG/ML
GFR SERPLBLD CREATININE-BSD FMLA CKD-EPI: 101 ML/MIN/1.73 M 2
GLOBULIN SER CALC-MCNC: 2.9 G/DL (ref 1.9–3.5)
GLUCOSE SERPL-MCNC: 99 MG/DL (ref 65–99)
HBA1C MFR BLD: 5.5 % (ref 4–5.6)
HCT VFR BLD AUTO: 40.1 % (ref 37–47)
HGB BLD-MCNC: 13.9 G/DL (ref 12–16)
IMM GRANULOCYTES # BLD AUTO: 0.02 K/UL (ref 0–0.11)
IMM GRANULOCYTES NFR BLD AUTO: 0.3 % (ref 0–0.9)
IRON SATN MFR SERPL: 14 % (ref 15–55)
IRON SERPL-MCNC: 44 UG/DL (ref 40–170)
LYMPHOCYTES # BLD AUTO: 1.84 K/UL (ref 1–4.8)
LYMPHOCYTES NFR BLD: 25.7 % (ref 22–41)
MCH RBC QN AUTO: 30.8 PG (ref 27–33)
MCHC RBC AUTO-ENTMCNC: 34.7 G/DL (ref 32.2–35.5)
MCV RBC AUTO: 88.7 FL (ref 81.4–97.8)
MONOCYTES # BLD AUTO: 0.35 K/UL (ref 0–0.85)
MONOCYTES NFR BLD AUTO: 4.9 % (ref 0–13.4)
NEUTROPHILS # BLD AUTO: 4.79 K/UL (ref 1.82–7.42)
NEUTROPHILS NFR BLD: 66.7 % (ref 44–72)
NRBC # BLD AUTO: 0 K/UL
NRBC BLD-RTO: 0 /100 WBC (ref 0–0.2)
PLATELET # BLD AUTO: 239 K/UL (ref 164–446)
PMV BLD AUTO: 11.1 FL (ref 9–12.9)
POTASSIUM SERPL-SCNC: 4.1 MMOL/L (ref 3.6–5.5)
PROT SERPL-MCNC: 7.4 G/DL (ref 6–8.2)
RBC # BLD AUTO: 4.52 M/UL (ref 4.2–5.4)
SODIUM SERPL-SCNC: 136 MMOL/L (ref 135–145)
TIBC SERPL-MCNC: 322 UG/DL (ref 250–450)
TSH SERPL DL<=0.005 MIU/L-ACNC: 0.98 UIU/ML (ref 0.38–5.33)
UIBC SERPL-MCNC: 278 UG/DL (ref 110–370)
VIT B12 SERPL-MCNC: 751 PG/ML (ref 211–911)
WBC # BLD AUTO: 7.2 K/UL (ref 4.8–10.8)

## 2025-07-11 PROCEDURE — 36415 COLL VENOUS BLD VENIPUNCTURE: CPT

## 2025-07-11 PROCEDURE — 82306 VITAMIN D 25 HYDROXY: CPT

## 2025-07-11 PROCEDURE — 82728 ASSAY OF FERRITIN: CPT

## 2025-07-11 PROCEDURE — 85610 PROTHROMBIN TIME: CPT

## 2025-07-11 PROCEDURE — 82607 VITAMIN B-12: CPT

## 2025-07-11 PROCEDURE — 83036 HEMOGLOBIN GLYCOSYLATED A1C: CPT

## 2025-07-11 PROCEDURE — 85730 THROMBOPLASTIN TIME PARTIAL: CPT

## 2025-07-11 PROCEDURE — 85670 THROMBIN TIME PLASMA: CPT

## 2025-07-11 PROCEDURE — 85613 RUSSELL VIPER VENOM DILUTED: CPT | Mod: 91

## 2025-07-11 PROCEDURE — 85025 COMPLETE CBC W/AUTO DIFF WBC: CPT

## 2025-07-11 PROCEDURE — 85520 HEPARIN ASSAY: CPT

## 2025-07-11 PROCEDURE — 80053 COMPREHEN METABOLIC PANEL: CPT

## 2025-07-11 PROCEDURE — 83540 ASSAY OF IRON: CPT

## 2025-07-11 PROCEDURE — 82746 ASSAY OF FOLIC ACID SERUM: CPT

## 2025-07-11 PROCEDURE — 85598 HEXAGNAL PHOSPH PLTLT NEUTRL: CPT

## 2025-07-11 PROCEDURE — 82180 ASSAY OF ASCORBIC ACID: CPT

## 2025-07-11 PROCEDURE — 83550 IRON BINDING TEST: CPT

## 2025-07-11 PROCEDURE — 84443 ASSAY THYROID STIM HORMONE: CPT

## 2025-07-11 NOTE — Clinical Note
REFERRAL APPROVAL NOTICE         Sent on July 11, 2025                   Divine Elise  [address]  [facility] [address]                   Dear Ms. Elise,    After a careful review of the medical information and benefit coverage, [facility] has processed your referral. See below for additional details.    If applicable, you must be actively enrolled with your insurance for coverage of the authorized service. If you have any questions regarding your coverage, please contact your insurance directly.    REFERRAL INFORMATION   Referral #:  [id number]  Referred-To Provider    Referred-By Provider:  OB & Gyn - Gynecology    MONCHO Chavez MARTIN E      [address]  [facility] [id number] [phone] [facility] [facility] [address]  [facility] [address] [phone]    Referral Start Date:  07/10/2025  Referral End Date:   07/10/2026             SCHEDULING  If you do not already have an appointment, please call [phone] to make an appointment.     MORE INFORMATION  If you do not already have a [employer] account, sign up at: [email or web address]  You can access your medical information, make appointments, see lab results, billing information, and more.  If you have questions regarding this referral, please contact  the [facility] Referrals department at:             [phone]. Monday - Friday 8:00AM - 5:00PM.     Sincerely,    [facility]

## 2025-07-11 NOTE — Clinical Note
REFERRAL APPROVAL NOTICE         Sent on July 11, 2025                   Divine Elise  [address]  [place] [address]                   Dear Ms. Elise,    After a careful review of the medical information and benefit coverage, [facility] has processed your referral. See below for additional details.    If applicable, you must be actively enrolled with your insurance for coverage of the authorized service. If you have any questions regarding your coverage, please contact your insurance directly.    REFERRAL INFORMATION   Referral #:  [id number]  Referred-To Provider    Referred-By Provider:  Behavioral Health    Carla Kuhn P.A.-C.   [address]  [facility] [address]  [phone] [address]  [facility] [address]  [phone]    Referral Start Date:  07/10/2025  Referral End Date:   07/10/2026             SCHEDULING  If you do not already have an appointment, please call [phone] to make an appointment.     MORE INFORMATION  If you do not already have a [employer] account, sign up at: [email or web address]  You can access your medical information, make appointments, see lab results, billing information, and more.  If you have questions regarding this referral, please contact  the [facility] Referrals department at:             [phone]. Monday - Friday 8:00AM - 5:00PM.     Sincerely,    [facility]

## 2025-07-13 LAB
BACTERIA UR CULT: NORMAL
SIGNIFICANT IND 70042: NORMAL
SITE SITE: NORMAL
SOURCE SOURCE: NORMAL

## 2025-07-16 LAB
APTT SCREEN TO CONFIRM RATIO: 1.35 {RATIO}
CONFIRM DRVVT STA-STACLOT: 9.9 S
DRVVT SCREEN TO CONFIRM RATIO: 1.09 {RATIO}
DRVVT SCREEN TO CONFIRM RATIO: ABNORMAL {RATIO}
DRVVT/DRVVT CFM P DOAC NEUT NORM PPP-RTO: ABNORMAL {RATIO}
HEPARIN NT PPP QL: ABNORMAL
LA 3 SCREEN W REFLEX-IMP: ABNORMAL
LMW HEPARIN IND PLT AB SER QL: ABNORMAL
MIXING DRVVT: ABNORMAL S
PROTHROMBIN TIME: 13.9 S (ref 12–15.5)
SCREEN APTT: ABNORMAL S
THROMBIN TIME: 17.4 S

## 2025-07-17 LAB — VIT C SERPL-MCNC: 72 UMOL/L (ref 23–114)

## 2025-07-22 ENCOUNTER — OFFICE VISIT (OUTPATIENT)
Dept: MEDICAL GROUP | Facility: CLINIC | Age: 25
End: 2025-07-22
Payer: MEDICAID

## 2025-07-22 VITALS
HEART RATE: 70 BPM | SYSTOLIC BLOOD PRESSURE: 116 MMHG | DIASTOLIC BLOOD PRESSURE: 76 MMHG | OXYGEN SATURATION: 99 % | BODY MASS INDEX: 35.26 KG/M2 | WEIGHT: 211.64 LBS | RESPIRATION RATE: 16 BRPM | TEMPERATURE: 97.6 F | HEIGHT: 65 IN

## 2025-07-22 DIAGNOSIS — E55.9 VITAMIN D DEFICIENCY: ICD-10-CM

## 2025-07-22 DIAGNOSIS — T14.8XXA BRUISING: ICD-10-CM

## 2025-07-22 DIAGNOSIS — N32.81 OVERACTIVE BLADDER: ICD-10-CM

## 2025-07-22 DIAGNOSIS — R76.0 LUPUS ANTICOAGULANT POSITIVE: Primary | ICD-10-CM

## 2025-07-22 DIAGNOSIS — M79.89 SWELLING OF LOWER LEG: ICD-10-CM

## 2025-07-22 PROCEDURE — 99214 OFFICE O/P EST MOD 30 MIN: CPT | Performed by: PHYSICIAN ASSISTANT

## 2025-07-22 PROCEDURE — 3074F SYST BP LT 130 MM HG: CPT | Performed by: PHYSICIAN ASSISTANT

## 2025-07-22 PROCEDURE — 3078F DIAST BP <80 MM HG: CPT | Performed by: PHYSICIAN ASSISTANT

## 2025-07-22 ASSESSMENT — FIBROSIS 4 INDEX: FIB4 SCORE: 0.53

## 2025-07-22 NOTE — PROGRESS NOTES
"cc:  positive lupus anticoagulant    Subjective:     Divine Elise is a 24 y.o. female presenting for postive lupus anticoagulant        History of Present Illness  The patient presents to the office today to discuss test results that were ordered due to her complaints of frequent bruising.    She reports an increase in the frequency of her bruising, which she describes as deep and dark. She recalls experiencing pain during her pregnancy but notes no current discomfort. Recent testing shows a positive lupus anticoagulant factor and mildly low iron saturation. She has been taking iron supplements.    She has a pelvic exam scheduled and still experiences issues even after antibiotic treatment. She feels the urge to urinate but does not actually need to, and then ends up urinating. She has a sensation in her bladder. She is considering waiting for the pelvic exam before starting any medication for overactive bladder.    FAMILY HISTORY  - Mother had similar issues with clotting and bruising around the same age, believed to have led to fibromyalgia       Review of systems:  See above.   Denies any symptoms unless previously indicated.      Current Medications[1]    Allergies, past medical history, past surgical history, family history, social history reviewed and updated    Objective:     Vitals: /76 (BP Location: Left arm, Patient Position: Sitting, BP Cuff Size: Large adult)   Pulse 70   Temp 36.4 °C (97.6 °F) (Temporal)   Resp 16   Ht 1.651 m (5' 5\")   Wt 96 kg (211 lb 10.3 oz)   LMP 07/16/2025 (Exact Date)   SpO2 99%   BMI 35.22 kg/m²   General: Alert, pleasant, NAD  EYES:   PERRL, EOMI, no icterus or pallor.  Conjunctivae and lids normal.   HENT:  Normocephalic.  External ears normal.  Neck supple.    Respiratory: Normal respiratory effort.    Abdomen: obese  Skin: Warm, dry, no rashes.  Musculoskeletal: Gait is normal.  Moves all extremities well.    Extremities: varicosity or bruising lateral " lower left leg with swelling.  .   Neurological: No tremors, sensation grossly intact,  CN2-12 intact.  Psych:  Affect/mood is normal, judgement is good, memory is intact, grooming is appropriate.    Results  Labs   - Exodonid and lupus anticoagulant factor: Positive   - Iron saturation: Mildly low   - Red and white blood cells: Normal   - Ferritin: Slightly low   - Folic acid: Normal   - Vitamin B12: Normal   - Average sugar: Normal   - Vitamin D: A little bit low   - Kidney function: Normal   - Fasting sugar: 99   - Liver function: Normal   - Vitamin C: Normal      Latest Reference Range & Units 07/11/25 08:29 07/11/25 08:30   WBC 4.8 - 10.8 K/uL  7.2   RBC 4.20 - 5.40 M/uL  4.52   Hemoglobin 12.0 - 16.0 g/dL  13.9   Hematocrit 37.0 - 47.0 %  40.1   MCV 81.4 - 97.8 fL  88.7   MCH 27.0 - 33.0 pg  30.8   MCHC 32.2 - 35.5 g/dL  34.7   RDW 35.9 - 50.0 fL  42.5   Platelet Count 164 - 446 K/uL  239   MPV 9.0 - 12.9 fL  11.1   Neutrophils-Polys 44.00 - 72.00 %  66.70   Neutrophils (Absolute) 1.82 - 7.42 K/uL  4.79   Lymphocytes 22.00 - 41.00 %  25.70   Lymphs (Absolute) 1.00 - 4.80 K/uL  1.84   Monocytes 0.00 - 13.40 %  4.90   Monos (Absolute) 0.00 - 0.85 K/uL  0.35   Eosinophils 0.00 - 6.90 %  1.70   Eos (Absolute) 0.00 - 0.51 K/uL  0.12   Basophils 0.00 - 1.80 %  0.70   Baso (Absolute) 0.00 - 0.12 K/uL  0.05   Immature Granulocytes 0.00 - 0.90 %  0.30   Immature Granulocytes (abs) 0.00 - 0.11 K/uL  0.02   Nucleated RBC 0.00 - 0.20 /100 WBC  0.00   NRBC (Absolute) K/uL  0.00   Sodium 135 - 145 mmol/L  136   Potassium 3.6 - 5.5 mmol/L  4.1   Chloride 96 - 112 mmol/L  105   Co2 20 - 33 mmol/L  20   Anion Gap 7.0 - 16.0   11.0   Glucose 65 - 99 mg/dL  99   Bun 8 - 22 mg/dL  11   Creatinine 0.50 - 1.40 mg/dL  0.82   GFR (CKD-EPI) >60 mL/min/1.73 m 2  101   Calcium 8.5 - 10.5 mg/dL  9.1   Correct Calcium 8.5 - 10.5 mg/dL  8.7   AST(SGOT) 12 - 45 U/L  19   ALT(SGPT) 2 - 50 U/L  15   Alkaline Phosphatase 30 - 99 U/L  60    Total Bilirubin 0.1 - 1.5 mg/dL  0.3   Albumin 3.2 - 4.9 g/dL  4.5   Total Protein 6.0 - 8.2 g/dL  7.4   Globulin 1.9 - 3.5 g/dL  2.9   A-G Ratio g/dL  1.6   Iron 40 - 170 ug/dL  44   Total Iron Binding 250 - 450 ug/dL  322   % Saturation 15 - 55 %  14 (L)   Unsat Iron Binding 110 - 370 ug/dL  278   Glycohemoglobin 4.0 - 5.6 %  5.5   Estim. Avg Glu mg/dL  111   Fasting Status   Fasting   PT 12.0 - 15.5 s 13.9    Anti-Xa Qualitative Interpretation Not Present  Not Present    Anticoagulant Medication Neutralization Not Performed  Not Performed    dRVVT Screen Ratio <=1.20  1.09    dRVVT Confirmation Ratio <=1.20  Not Performed    Neutralized dRVVT Screen Ratio <=1.20  Not Performed    Lupus Anticoag Interp  See Note    dRVVT 1:1 Mix <=1.20  Not Performed    dPT Confirm Ratio <=1.20  1.35 (H)    Hexagonal Phospholipid Confirmation <=7.9 s 9.9 (H)    PTT LA <=1.20  Not Performed    Thrombin Time <=19.5 s 17.4    (L): Data is abnormally low  (H): Data is abnormally high    Assessment/Plan:     Divine was seen today for lab results.    Diagnoses and all orders for this visit:    Lupus anticoagulant positive  -     URINALYSIS,CULTURE IF INDICATED; Future  -     THYROID PEROXIDASE  (TPO) AB; Future  -     SMITH AB IGG; Future  -     Sed Rate; Future  -     RHEUMATOID ARTHRITIS FACTOR; Future  -     MPO/TX-3 (ANCA) ABS; Future  -     CRP HIGH SENSITIVE (CARDIAC); Future  -     COMPLEMENT TOTAL (CH50); Future  -     CCP; Future  -     C3+C4+COMPT  -     ANTITHYROGLOBULIN AB; Future  -     ANTI-DNA (DS); Future  -     TAMMIE REFLEXIVE PROFILE; Future  -     REFERRAL TO CARDIOLOGY  -     US-EXTREMITY VENOUS LOWER UNILAT LEFT; Future    Bruising  -     THYROID PEROXIDASE  (TPO) AB; Future  -     SMITH AB IGG; Future  -     Sed Rate; Future  -     RHEUMATOID ARTHRITIS FACTOR; Future  -     MPO/TX-3 (ANCA) ABS; Future  -     CRP HIGH SENSITIVE (CARDIAC); Future  -     COMPLEMENT TOTAL (CH50); Future  -     CCP; Future  -      C3+C4+COMPT  -     ANTITHYROGLOBULIN AB; Future  -     ANTI-DNA (DS); Future  -     TAMMIE REFLEXIVE PROFILE; Future  -     REFERRAL TO CARDIOLOGY  -     US-EXTREMITY VENOUS LOWER UNILAT LEFT; Future    Swelling of lower leg  -     US-EXTREMITY VENOUS LOWER UNILAT LEFT; Future    Overactive bladder    Vitamin D deficiency  -     Cholecalciferol (VITAMIN D3) 125 MCG (5000 UT) Cap; Take 1 Capsule by mouth every day.        Assessment & Plan  1. Positive lupus anticoagulation factor/bruising/swelling of the lower left leg.  - A stat ultrasound of the lower leg will be obtained. She understands that any worsening symptoms, including pain, should prompt a visit to urgent care or the ER for further evaluation.  - Autoimmune testing will be conducted due to the positive lupus anticoagulant, as research indicates this can contribute to vasculitis.  - A referral to cardiology will be submitted, but a rheumatology referral will be held until further lab work is completed.  - She will be notified of the ultrasound results upon receipt.    2. Overactive bladder.  - Medication options were discussed, but she has declined them at this time.  - She will inform us if she changes her mind regarding medication.    3. Vitamin D deficiency.  - Vitamin D supplements have been prescribed and sent to her pharmacy on file.    4. Anemia.  - She is slightly anemic.  - She is currently taking an iron supplement.  - Her iron levels will be rechecked in 6 to 8 weeks.    Follow-up: She will follow up in approximately 6 to 8 weeks.    Return if symptoms worsen or fail to improve, for beginning to mid september lab follow up.    Please note that this dictation was created using voice recognition software. I have made every reasonable attempt to correct obvious errors, but expect that there are errors of grammar and possible content that I did not discover before finalizing note.               [1]   Current Outpatient Medications:     Cholecalciferol  (VITAMIN D3) 125 MCG (5000 UT) Cap, Take 1 Capsule by mouth every day., Disp: 90 Capsule, Rfl: 2    etonogestrel (NEXPLANON) 68 MG Implant implant, 1 Each by Subdermal route one time., Disp: , Rfl:     ibuprofen (MOTRIN) 800 MG Tab, Take 1 Tablet by mouth every 8 hours as needed for Mild Pain or Moderate Pain., Disp: 30 Tablet, Rfl: 0    nitrofurantoin (MACROBID) 100 MG Cap, Take 1 Capsule by mouth 2 times a day. (Patient not taking: Reported on 7/22/2025), Disp: 14 Capsule, Rfl: 0    ondansetron (ZOFRAN ODT) 4 MG TABLET DISPERSIBLE, Take 1 Tablet by mouth every 8 hours as needed for Nausea/Vomiting. (Patient not taking: Reported on 7/22/2025), Disp: 10 Tablet, Rfl: 0    acetaminophen (TYLENOL) 500 MG Tab, Take 2 Tablets by mouth every 6 hours. (Patient not taking: Reported on 7/22/2025), Disp: 30 Tablet, Rfl: 0

## 2025-07-25 ENCOUNTER — APPOINTMENT (OUTPATIENT)
Dept: RADIOLOGY | Facility: MEDICAL CENTER | Age: 25
End: 2025-07-25
Attending: PHYSICIAN ASSISTANT
Payer: MEDICAID

## 2025-07-26 ENCOUNTER — HOSPITAL ENCOUNTER (OUTPATIENT)
Dept: RADIOLOGY | Facility: MEDICAL CENTER | Age: 25
End: 2025-07-26
Attending: PHYSICIAN ASSISTANT
Payer: MEDICAID

## 2025-07-26 DIAGNOSIS — R76.0 LUPUS ANTICOAGULANT POSITIVE: ICD-10-CM

## 2025-07-26 DIAGNOSIS — T14.8XXA BRUISING: ICD-10-CM

## 2025-07-26 DIAGNOSIS — M79.89 SWELLING OF LOWER LEG: ICD-10-CM

## 2025-07-26 PROCEDURE — 93971 EXTREMITY STUDY: CPT | Mod: LT

## 2025-07-26 PROCEDURE — 93971 EXTREMITY STUDY: CPT | Mod: 26,LT | Performed by: INTERNAL MEDICINE

## 2025-07-29 ENCOUNTER — HOSPITAL ENCOUNTER (OUTPATIENT)
Dept: LAB | Facility: MEDICAL CENTER | Age: 25
End: 2025-07-29
Attending: PHYSICIAN ASSISTANT
Payer: MEDICAID

## 2025-07-29 DIAGNOSIS — T14.8XXA BRUISING: ICD-10-CM

## 2025-07-29 DIAGNOSIS — R76.0 LUPUS ANTICOAGULANT POSITIVE: ICD-10-CM

## 2025-07-29 LAB
APPEARANCE UR: CLEAR
BACTERIA #/AREA URNS HPF: NORMAL /HPF
BILIRUB UR QL STRIP.AUTO: NEGATIVE
C3 SERPL-MCNC: 142 MG/DL (ref 87–200)
C4 SERPL-MCNC: 27.5 MG/DL (ref 19–52)
CASTS URNS QL MICRO: NORMAL /LPF (ref 0–2)
COLOR UR: YELLOW
CRP SERPL HS-MCNC: 4.7 MG/L (ref 0–3)
EPITHELIAL CELLS 1715: NORMAL /HPF (ref 0–5)
ERYTHROCYTE [SEDIMENTATION RATE] IN BLOOD BY WESTERGREN METHOD: 17 MM/HOUR (ref 0–25)
GLUCOSE UR STRIP.AUTO-MCNC: NEGATIVE MG/DL
KETONES UR STRIP.AUTO-MCNC: NEGATIVE MG/DL
LEUKOCYTE ESTERASE UR QL STRIP.AUTO: ABNORMAL
MICRO URNS: ABNORMAL
NITRITE UR QL STRIP.AUTO: NEGATIVE
PH UR STRIP.AUTO: 7 [PH] (ref 5–8)
PROT UR QL STRIP: NEGATIVE MG/DL
RBC # URNS HPF: NORMAL /HPF (ref 0–2)
RBC UR QL AUTO: NEGATIVE
RHEUMATOID FACT SER IA-ACNC: <10 IU/ML (ref 0–14)
SP GR UR STRIP.AUTO: 1.01
THYROPEROXIDASE AB SERPL-ACNC: 11.8 IU/ML (ref 0–9)
UROBILINOGEN UR STRIP.AUTO-MCNC: 0.2 EU/DL
WBC #/AREA URNS HPF: NORMAL /HPF

## 2025-07-29 PROCEDURE — 36415 COLL VENOUS BLD VENIPUNCTURE: CPT

## 2025-07-29 PROCEDURE — 86376 MICROSOMAL ANTIBODY EACH: CPT

## 2025-07-29 PROCEDURE — 86800 THYROGLOBULIN ANTIBODY: CPT

## 2025-07-29 PROCEDURE — 81001 URINALYSIS AUTO W/SCOPE: CPT

## 2025-07-29 PROCEDURE — 86225 DNA ANTIBODY NATIVE: CPT

## 2025-07-29 PROCEDURE — 86162 COMPLEMENT TOTAL (CH50): CPT

## 2025-07-29 PROCEDURE — 85652 RBC SED RATE AUTOMATED: CPT

## 2025-07-29 PROCEDURE — 86141 C-REACTIVE PROTEIN HS: CPT

## 2025-07-29 PROCEDURE — 83516 IMMUNOASSAY NONANTIBODY: CPT

## 2025-07-29 PROCEDURE — 86200 CCP ANTIBODY: CPT

## 2025-07-29 PROCEDURE — 86038 ANTINUCLEAR ANTIBODIES: CPT

## 2025-07-29 PROCEDURE — 86160 COMPLEMENT ANTIGEN: CPT | Mod: 91

## 2025-07-29 PROCEDURE — 86235 NUCLEAR ANTIGEN ANTIBODY: CPT

## 2025-07-29 PROCEDURE — 86431 RHEUMATOID FACTOR QUANT: CPT

## 2025-07-31 LAB
CCP IGA+IGG SERPL IA-ACNC: 10 UNITS (ref 0–19)
CH50 SERPL-ACNC: 80.9 U/ML (ref 38.7–89.9)
DSDNA AB TITR SER CLIF: NORMAL {TITER}
MYELOPEROXIDASE AB SER-ACNC: 0 AU/ML (ref 0–19)
NUCLEAR IGG SER QL IA: NORMAL
PROTEINASE3 AB SER-ACNC: 0 AU/ML (ref 0–19)
THYROGLOB AB SERPL-ACNC: 1.8 IU/ML (ref 0–4)

## 2025-08-01 LAB — ENA SM IGG SER-ACNC: 1 AU/ML (ref 0–40)

## 2025-08-14 ENCOUNTER — OFFICE VISIT (OUTPATIENT)
Dept: MEDICAL GROUP | Facility: CLINIC | Age: 25
End: 2025-08-14
Payer: MEDICAID

## 2025-08-14 VITALS
HEART RATE: 85 BPM | HEIGHT: 65 IN | DIASTOLIC BLOOD PRESSURE: 70 MMHG | OXYGEN SATURATION: 98 % | SYSTOLIC BLOOD PRESSURE: 110 MMHG | WEIGHT: 208.34 LBS | BODY MASS INDEX: 34.71 KG/M2 | TEMPERATURE: 97.3 F | RESPIRATION RATE: 16 BRPM

## 2025-08-14 DIAGNOSIS — G89.29 CHRONIC MIDLINE LOW BACK PAIN WITHOUT SCIATICA: ICD-10-CM

## 2025-08-14 DIAGNOSIS — M54.9 MID BACK PAIN: ICD-10-CM

## 2025-08-14 DIAGNOSIS — R76.8 THYROID ANTIBODY POSITIVE: ICD-10-CM

## 2025-08-14 DIAGNOSIS — R79.82 ELEVATED C-REACTIVE PROTEIN (CRP): ICD-10-CM

## 2025-08-14 DIAGNOSIS — G56.02 CARPAL TUNNEL SYNDROME, LEFT: ICD-10-CM

## 2025-08-14 DIAGNOSIS — R76.0 LUPUS ANTICOAGULANT POSITIVE: Primary | ICD-10-CM

## 2025-08-14 DIAGNOSIS — M54.50 CHRONIC MIDLINE LOW BACK PAIN WITHOUT SCIATICA: ICD-10-CM

## 2025-08-14 PROCEDURE — 3078F DIAST BP <80 MM HG: CPT | Performed by: PHYSICIAN ASSISTANT

## 2025-08-14 PROCEDURE — 99214 OFFICE O/P EST MOD 30 MIN: CPT | Performed by: PHYSICIAN ASSISTANT

## 2025-08-14 PROCEDURE — 3074F SYST BP LT 130 MM HG: CPT | Performed by: PHYSICIAN ASSISTANT

## 2025-08-14 ASSESSMENT — FIBROSIS 4 INDEX: FIB4 SCORE: 0.55

## 2025-08-19 ENCOUNTER — HOSPITAL ENCOUNTER (OUTPATIENT)
Facility: MEDICAL CENTER | Age: 25
End: 2025-08-19
Attending: PHYSICIAN ASSISTANT
Payer: MEDICAID

## 2025-08-19 DIAGNOSIS — N63.10 MASS OF RIGHT BREAST, UNSPECIFIED QUADRANT: ICD-10-CM

## 2025-08-19 PROCEDURE — 76642 ULTRASOUND BREAST LIMITED: CPT | Mod: RT

## 2025-08-22 ENCOUNTER — HOSPITAL ENCOUNTER (OUTPATIENT)
Dept: RADIOLOGY | Facility: MEDICAL CENTER | Age: 25
End: 2025-08-22
Attending: PHYSICIAN ASSISTANT
Payer: MEDICAID

## 2025-08-22 DIAGNOSIS — R10.2 PELVIC PAIN: ICD-10-CM

## 2025-08-22 PROCEDURE — 76830 TRANSVAGINAL US NON-OB: CPT

## (undated) DEVICE — GLOVE BIOGEL INDICATOR SZ 7SURGICAL PF LTX - (50/BX 4BX/CA)

## (undated) DEVICE — GLOVE BIOGEL INDICATOR SZ 6.5 SURGICAL PF LTX - (50PR/BX 4BX/CA)

## (undated) DEVICE — SET EXTENSION WITH 2 PORTS (48EA/CA) ***PART #2C8610 IS A SUBSTITUTE*****

## (undated) DEVICE — GLOVE BIOGEL SZ 7 SURGICAL PF LTX - (50PR/BX 4BX/CA)

## (undated) DEVICE — WATER IRRIGATION STERILE 1000ML (12EA/CA)

## (undated) DEVICE — CHLORAPREP 26 ML APPLICATOR - ORANGE TINT(25/CA)

## (undated) DEVICE — TUBING CLEARLINK DUO-VENT - C-FLO (48EA/CA)

## (undated) DEVICE — SUTURE 2-0 MONOCRYL CT-1

## (undated) DEVICE — TRAY SPINAL ANESTHESIA NON-SAFETY (10/CA)

## (undated) DEVICE — CANISTER SUCTION 3000ML MECHANICAL FILTER AUTO SHUTOFF MEDI-VAC NONSTERILE LF DISP  (40EA/CA)

## (undated) DEVICE — HEAD HOLDER JUNIOR/ADULT

## (undated) DEVICE — RETRACTOR O C SECTION LRY - (5/BX)

## (undated) DEVICE — ELECTRODE DUAL RETURN W/ CORD - (50/PK)

## (undated) DEVICE — CATHETER IV NON-SAFETY 18 GA X 1 1/4 (50/BX 4BX/CA)

## (undated) DEVICE — PACK ROOM TURNOVER L&D (12/CA)

## (undated) DEVICE — SUTURE 1 CHROMIC GUTCT-1 27 (36PK/BX)"

## (undated) DEVICE — PACK C-SECTION (2EA/CA)

## (undated) DEVICE — BLANKET UNDERBODY FULL ACCES - (5/CA)

## (undated) DEVICE — SOLUTION PLASMA-LYTE PH 7.4 INJ 1000ML  (14EA/CA)

## (undated) DEVICE — SODIUM CHL IRRIGATION 0.9% 1000ML (12EA/CA)

## (undated) DEVICE — DRESSING NON-ADHERING 8 X 3 - (50/BX)

## (undated) DEVICE — SUTURE 0 VICRYL PLUS CT-1 - 36 INCH (36/BX)

## (undated) DEVICE — SUTURE 0 VICRYL PLUS CT 36 (36PK/BX)"

## (undated) DEVICE — TAPE CLOTH MEDIPORE 6 INCH - (12RL/CA)

## (undated) DEVICE — STAPLER SKIN DISP - (6/BX 10BX/CA) VISISTAT

## (undated) DEVICE — HEMOSTAT ABSORBABLE POWDER SURGICEL 3G (5EA/BX)

## (undated) DEVICE — SUTURE 3-0 MONOCRYL PLUS PS-1 - 27 INCH (36/BX)

## (undated) DEVICE — DRESSING POST OP BORDER 4 X 10 (5EA/BX)

## (undated) DEVICE — DETERGENT RENUZYME PLUS 10 OZ PACKET (50/BX)

## (undated) DEVICE — SLEEVE, SEQUENTIAL CALF REG

## (undated) DEVICE — SPONGE GAUZESTER 4 X 4 4PLY - (128PK/CA)

## (undated) DEVICE — KIT  I.V. START (100EA/CA)

## (undated) DEVICE — SUTURE 0 36IN PDS + VIO CT-1 (36PK/BX)

## (undated) DEVICE — GLOVE BIOGEL SZ 6 PF LATEX - (50EA/BX 4BX/CA)